# Patient Record
Sex: FEMALE | Race: WHITE | NOT HISPANIC OR LATINO
[De-identification: names, ages, dates, MRNs, and addresses within clinical notes are randomized per-mention and may not be internally consistent; named-entity substitution may affect disease eponyms.]

---

## 2017-02-23 ENCOUNTER — APPOINTMENT (OUTPATIENT)
Dept: ULTRASOUND IMAGING | Facility: CLINIC | Age: 72
End: 2017-02-23

## 2017-02-23 ENCOUNTER — OUTPATIENT (OUTPATIENT)
Dept: OUTPATIENT SERVICES | Facility: HOSPITAL | Age: 72
LOS: 1 days | End: 2017-02-23
Payer: MEDICARE

## 2017-02-23 DIAGNOSIS — R10.13 EPIGASTRIC PAIN: ICD-10-CM

## 2017-02-23 PROCEDURE — 76700 US EXAM ABDOM COMPLETE: CPT

## 2017-09-15 ENCOUNTER — EMERGENCY (EMERGENCY)
Facility: HOSPITAL | Age: 72
LOS: 1 days | Discharge: HOME | End: 2017-09-15
Attending: EMERGENCY MEDICINE | Admitting: EMERGENCY MEDICINE
Payer: MEDICARE

## 2017-09-15 VITALS
OXYGEN SATURATION: 98 % | DIASTOLIC BLOOD PRESSURE: 67 MMHG | RESPIRATION RATE: 17 BRPM | SYSTOLIC BLOOD PRESSURE: 141 MMHG | HEART RATE: 72 BPM

## 2017-09-15 VITALS
HEART RATE: 65 BPM | DIASTOLIC BLOOD PRESSURE: 85 MMHG | TEMPERATURE: 98 F | RESPIRATION RATE: 18 BRPM | SYSTOLIC BLOOD PRESSURE: 178 MMHG | OXYGEN SATURATION: 96 %

## 2017-09-15 LAB
ALBUMIN SERPL ELPH-MCNC: 4.3 G/DL — SIGNIFICANT CHANGE UP (ref 3.3–5)
ALP SERPL-CCNC: 76 U/L — SIGNIFICANT CHANGE UP (ref 40–120)
ALT FLD-CCNC: 19 U/L RC — SIGNIFICANT CHANGE UP (ref 10–45)
ANION GAP SERPL CALC-SCNC: 12 MMOL/L — SIGNIFICANT CHANGE UP (ref 5–17)
AST SERPL-CCNC: 18 U/L — SIGNIFICANT CHANGE UP (ref 10–40)
BASOPHILS # BLD AUTO: 0 K/UL — SIGNIFICANT CHANGE UP (ref 0–0.2)
BASOPHILS NFR BLD AUTO: 0.3 % — SIGNIFICANT CHANGE UP (ref 0–2)
BILIRUB SERPL-MCNC: 1.2 MG/DL — SIGNIFICANT CHANGE UP (ref 0.2–1.2)
BUN SERPL-MCNC: 16 MG/DL — SIGNIFICANT CHANGE UP (ref 7–23)
CALCIUM SERPL-MCNC: 9.1 MG/DL — SIGNIFICANT CHANGE UP (ref 8.4–10.5)
CHLORIDE SERPL-SCNC: 104 MMOL/L — SIGNIFICANT CHANGE UP (ref 96–108)
CK MB CFR SERPL CALC: 4.9 NG/ML — HIGH (ref 0–3.8)
CK SERPL-CCNC: 67 U/L — SIGNIFICANT CHANGE UP (ref 25–170)
CO2 SERPL-SCNC: 29 MMOL/L — SIGNIFICANT CHANGE UP (ref 22–31)
CREAT SERPL-MCNC: 1.08 MG/DL — SIGNIFICANT CHANGE UP (ref 0.5–1.3)
EOSINOPHIL # BLD AUTO: 0.1 K/UL — SIGNIFICANT CHANGE UP (ref 0–0.5)
EOSINOPHIL NFR BLD AUTO: 1 % — SIGNIFICANT CHANGE UP (ref 0–6)
GLUCOSE SERPL-MCNC: 220 MG/DL — HIGH (ref 70–99)
HCT VFR BLD CALC: 42.5 % — SIGNIFICANT CHANGE UP (ref 34.5–45)
HGB BLD-MCNC: 14.8 G/DL — SIGNIFICANT CHANGE UP (ref 11.5–15.5)
LYMPHOCYTES # BLD AUTO: 1.7 K/UL — SIGNIFICANT CHANGE UP (ref 1–3.3)
LYMPHOCYTES # BLD AUTO: 16.9 % — SIGNIFICANT CHANGE UP (ref 13–44)
MAGNESIUM SERPL-MCNC: 2.2 MG/DL — SIGNIFICANT CHANGE UP (ref 1.6–2.6)
MCHC RBC-ENTMCNC: 28.4 PG — SIGNIFICANT CHANGE UP (ref 27–34)
MCHC RBC-ENTMCNC: 34.9 GM/DL — SIGNIFICANT CHANGE UP (ref 32–36)
MCV RBC AUTO: 81.3 FL — SIGNIFICANT CHANGE UP (ref 80–100)
MONOCYTES # BLD AUTO: 0.7 K/UL — SIGNIFICANT CHANGE UP (ref 0–0.9)
MONOCYTES NFR BLD AUTO: 7 % — SIGNIFICANT CHANGE UP (ref 2–14)
NEUTROPHILS # BLD AUTO: 7.7 K/UL — HIGH (ref 1.8–7.4)
NEUTROPHILS NFR BLD AUTO: 74.8 % — SIGNIFICANT CHANGE UP (ref 43–77)
PHOSPHATE SERPL-MCNC: 2.3 MG/DL — LOW (ref 2.5–4.5)
PLATELET # BLD AUTO: 183 K/UL — SIGNIFICANT CHANGE UP (ref 150–400)
POTASSIUM SERPL-MCNC: 3.5 MMOL/L — SIGNIFICANT CHANGE UP (ref 3.5–5.3)
POTASSIUM SERPL-SCNC: 3.5 MMOL/L — SIGNIFICANT CHANGE UP (ref 3.5–5.3)
PROT SERPL-MCNC: 7 G/DL — SIGNIFICANT CHANGE UP (ref 6–8.3)
RBC # BLD: 5.23 M/UL — HIGH (ref 3.8–5.2)
RBC # FLD: 13.3 % — SIGNIFICANT CHANGE UP (ref 10.3–14.5)
SODIUM SERPL-SCNC: 145 MMOL/L — SIGNIFICANT CHANGE UP (ref 135–145)
TROPONIN T SERPL-MCNC: <0.01 NG/ML — SIGNIFICANT CHANGE UP (ref 0–0.06)
TROPONIN T SERPL-MCNC: <0.01 NG/ML — SIGNIFICANT CHANGE UP (ref 0–0.06)
WBC # BLD: 10.3 K/UL — SIGNIFICANT CHANGE UP (ref 3.8–10.5)
WBC # FLD AUTO: 10.3 K/UL — SIGNIFICANT CHANGE UP (ref 3.8–10.5)

## 2017-09-15 PROCEDURE — 71046 X-RAY EXAM CHEST 2 VIEWS: CPT

## 2017-09-15 PROCEDURE — 80053 COMPREHEN METABOLIC PANEL: CPT

## 2017-09-15 PROCEDURE — 85027 COMPLETE CBC AUTOMATED: CPT

## 2017-09-15 PROCEDURE — 84484 ASSAY OF TROPONIN QUANT: CPT

## 2017-09-15 PROCEDURE — 93010 ELECTROCARDIOGRAM REPORT: CPT

## 2017-09-15 PROCEDURE — 83735 ASSAY OF MAGNESIUM: CPT

## 2017-09-15 PROCEDURE — 82550 ASSAY OF CK (CPK): CPT

## 2017-09-15 PROCEDURE — 82553 CREATINE MB FRACTION: CPT

## 2017-09-15 PROCEDURE — G0378: CPT

## 2017-09-15 PROCEDURE — 99220: CPT | Mod: 25

## 2017-09-15 PROCEDURE — 99284 EMERGENCY DEPT VISIT MOD MDM: CPT | Mod: 25

## 2017-09-15 PROCEDURE — 71020: CPT | Mod: 26

## 2017-09-15 PROCEDURE — 93005 ELECTROCARDIOGRAM TRACING: CPT

## 2017-09-15 PROCEDURE — 84100 ASSAY OF PHOSPHORUS: CPT

## 2017-09-15 RX ADMIN — Medication 0.2 MILLIGRAM(S): at 19:46

## 2017-09-15 NOTE — ED ADULT NURSE NOTE - OBJECTIVE STATEMENT
pt biba s/p hypotensive episode associated with diaphoresis with systolic in the 70's.  as per ems, pt suddenly began to look pale with diaphoretic.  pt has hx of hypertension and dm, so they checked her bp and found it was low x 3.  ems services was called for further intervention.  upon arrival to ed, pt was normal tensive and denies any accompanying s/s; pt states that she feels much better.  she also states that she thinks she took too much of her clonidine this morning.  pt denies any loc, h/a or dizziness at this time.  pt is awake, alert and responsive to all stimuli.  no sob or respiratory distress noted.  pt resting in bed in NSR awaiting md reeval.  will continue to monitor.

## 2017-09-15 NOTE — ED PROVIDER NOTE - OBJECTIVE STATEMENT
72 YOF presents to ED BIBEMS with low blood pressure, near syncope. Pt took extra clonidine by accident at 830 this morning and 15 minutes later felt fatigued, lightheaded diaphoretic, had BP checked 70s/40s and almost passed out, ems was called. Pt denies any current symptoms, chest pain, lightheadedness, fatigue, fevers, chills, abd pain, dysuria.

## 2017-09-15 NOTE — ED ADULT NURSE REASSESSMENT NOTE - NS ED NURSE REASSESS COMMENT FT1
Received pt from JOHN Medina , received pt alert and responsive, oriented x4, denies any respiratory distress, SOB, or difficulty breathing. Pt transferred to CDU for observation for "taking too much clonidine" pt bp elevated due to drinking "too much salt water", pt asymptomatic, PETE Mcgrath aware of elevated BP reading. Waiting for orders. Kosher meal given. IV in place, patent and free of signs of infiltration, placed on continuous cardiac monitoring as ordered, NSR HR in the 70's,  pt denies chest pain or palpitations, V/S stable, pt afebrile, pt denies pain at this time. Pt educated on unit and unit rules, instructed patient to notify RN of any needed assistance, Pt verbalizes understanding, Call bell placed within reach. Safety maintained. Will continue to monitor. Spouse at bedside. Received pt from JOHN Medina , received pt alert and responsive, oriented x4, denies any respiratory distress, SOB, or difficulty breathing. Pt transferred to CDU for observation for near syncope, pt states she "taking too much clonidine" pt bp elevated due to drinking "too much salt water", pt asymptomatic, PETE Mcgrath aware of elevated BP reading. Waiting for orders. Kosher meal given. IV in place, patent and free of signs of infiltration, placed on continuous cardiac monitoring as ordered, NSR HR in the 70's,  pt denies chest pain or palpitations, V/S stable, pt afebrile, pt denies pain at this time. Pt educated on unit and unit rules, instructed patient to notify RN of any needed assistance, Pt verbalizes understanding, Call bell placed within reach. Safety maintained. Will continue to monitor. Spouse at bedside.

## 2017-09-15 NOTE — ED ADULT NURSE REASSESSMENT NOTE - NS ED NURSE REASSESS COMMENT FT1
1500 received the pt from Tyler from [facility] pt is A&OX4 pt is ambulatory oriented to the unit Pt denies CP SOB N/V/D . states ? accidentally took .6 mg of clonidine. states she feels fine now vital signs are stable  continue to monitor

## 2017-09-15 NOTE — ED CDU PROVIDER NOTE - DETAILS
HYPOTENSION 2/2 medication OD  - Continuous telemetry monitoring   - Serial troponin  - q4 Vitals   - Freq re-eval  - Monitor for 12 hours from medication ingestion  D/w Dr. Zimmer

## 2017-09-15 NOTE — ED PROVIDER NOTE - ATTENDING CONTRIBUTION TO CARE
Attending MD Zimmer:  I personally have seen and examined this patient.  Resident note reviewed and agree on plan of care and except where noted.  See MDM for details.

## 2017-09-15 NOTE — ED CDU PROVIDER NOTE - PLAN OF CARE
1. Rest. Stay well hydrated. BE CAREFUL WHILE TAKING YOUR MEDICATION AND ONLY TAKE MEDICATION AS PRESCRIBED BY YOUR DOCTORS.   2. Follow up with your PMD and cardiologist, Dr. Naranjo, within 24-48 hours. Show copies of your reports given to you.    3. Return to ER for dizziness, chest pain, palpitations, shortness of breath, passing out, or any other concerning symptoms.

## 2017-09-15 NOTE — ED CDU PROVIDER NOTE - OBJECTIVE STATEMENT
72 YOF presents to ED BIBEMS with low blood pressure, near syncope. Pt took extra clonidine by accident at 830 this morning and 15 minutes later felt fatigued, lightheaded, diaphoretic, had BP checked 70s/40s and almost passed out, ems was called. Pt denies any current symptoms, chest pain, palpitations, lightheadedness, fatigue, fevers, chills, abd pain, dysuria.  In ED, VSS. Labs WNL, trop x 1 negative. Will admit to CDU for tele monitoring, repeat trop, q4 vitals and re-eval.   PCP DEBO Bates  Cards Christian Naranjo

## 2017-09-15 NOTE — ED ADULT NURSE REASSESSMENT NOTE - NS ED NURSE REASSESS COMMENT FT1
Pt d/c home per PETE Mcgrath/ Dr. Wong, VSS no complaints no distress, IV lock removed, D/C  documentation provided to pt by PA, ambulated out of unit independently, ambulated out of unit with spouse left hospital via private car.

## 2017-09-15 NOTE — ED CDU PROVIDER NOTE - PROGRESS NOTE DETAILS
Spoke with patient's cardiologist, Dr. Naranjo. Agrees with plan for CDU. - Narda Sweeney PA-C Patient observed ambulating around CDU and re-evaluated at bedside. Pt in NAD. No complaints. Vital Signs Stable. Repeat troponin negative. No events on telemetry monitor.  Will continue to monitor. -Narda Sweeney PA-C Patient resting comfortably in bed, NAD, currently hypertensive to 205/120. Has not received her evening dose of clonidine, will give 0.2mg and reassess. Patient otherwise asymptomatic, will discuss discharge with evening attending. Alcides Vera PA-C. Attending note--Pt doing better BP is well within acceptable range D/W pt regardingproper meds compliance and medicines safety Heart and Lungs wnl ambulatory will d/c have f/u with PMD --Wong

## 2017-09-15 NOTE — ED PROVIDER NOTE - PMH
Atrial Arrhythmia    Diabetes Mellitus    Hypercholesterolemia    Hypertension    Obesity (BMI 30-39.9)    Osteoarthritis    Urinary Urgency

## 2017-09-15 NOTE — ED PROVIDER NOTE - MEDICAL DECISION MAKING DETAILS
near syncope, took extra dose of clonidine. Will check electrolytes, ekg, troponin, reassess. near syncope, took extra dose of clonidine. Will check electrolytes, ekg, troponin, reassess.    Attending MD Zimmer: 72 YOF presents to ED BIBEMS with low blood pressure, near syncope. Pt took extra clonidine by accident at 830 this morning and 15 minutes later felt fatigued, lightheaded diaphoretic, had BP checked 70s/40s and near syncope.  On arrival in ED patient slightly lethargic but otherwise the physical exam and neurologic exam was normal.  likely medication side effect.  Will obtain labs, EKG, orthostatic BPs, IVF and observation.

## 2017-10-18 ENCOUNTER — OUTPATIENT (OUTPATIENT)
Dept: OUTPATIENT SERVICES | Facility: HOSPITAL | Age: 72
LOS: 1 days | Discharge: HOME | End: 2017-10-18

## 2017-10-18 LAB
GLUCOSE BLDC GLUCOMTR-MCNC: 133 MG/DL — HIGH (ref 70–99)
GLUCOSE BLDC GLUCOMTR-MCNC: 156 MG/DL — HIGH (ref 70–99)

## 2017-10-24 DIAGNOSIS — Z87.891 PERSONAL HISTORY OF NICOTINE DEPENDENCE: ICD-10-CM

## 2017-10-24 DIAGNOSIS — I10 ESSENTIAL (PRIMARY) HYPERTENSION: ICD-10-CM

## 2017-10-24 DIAGNOSIS — E10.9 TYPE 1 DIABETES MELLITUS WITHOUT COMPLICATIONS: ICD-10-CM

## 2017-10-24 DIAGNOSIS — H25.811 COMBINED FORMS OF AGE-RELATED CATARACT, RIGHT EYE: ICD-10-CM

## 2017-11-07 ENCOUNTER — APPOINTMENT (OUTPATIENT)
Dept: ULTRASOUND IMAGING | Facility: IMAGING CENTER | Age: 72
End: 2017-11-07
Payer: MEDICARE

## 2017-11-07 ENCOUNTER — OUTPATIENT (OUTPATIENT)
Dept: OUTPATIENT SERVICES | Facility: HOSPITAL | Age: 72
LOS: 1 days | End: 2017-11-07
Payer: MEDICARE

## 2017-11-07 DIAGNOSIS — Z00.8 ENCOUNTER FOR OTHER GENERAL EXAMINATION: ICD-10-CM

## 2017-11-07 PROCEDURE — 76536 US EXAM OF HEAD AND NECK: CPT | Mod: 26

## 2017-11-07 PROCEDURE — 76536 US EXAM OF HEAD AND NECK: CPT

## 2017-12-05 ENCOUNTER — TRANSCRIPTION ENCOUNTER (OUTPATIENT)
Age: 72
End: 2017-12-05

## 2018-01-02 ENCOUNTER — APPOINTMENT (OUTPATIENT)
Dept: COLORECTAL SURGERY | Facility: CLINIC | Age: 73
End: 2018-01-02
Payer: MEDICARE

## 2018-01-02 VITALS
SYSTOLIC BLOOD PRESSURE: 162 MMHG | HEIGHT: 61 IN | HEART RATE: 75 BPM | RESPIRATION RATE: 16 BRPM | WEIGHT: 178 LBS | BODY MASS INDEX: 33.61 KG/M2 | OXYGEN SATURATION: 97 % | DIASTOLIC BLOOD PRESSURE: 93 MMHG

## 2018-01-02 DIAGNOSIS — K62.5 HEMORRHAGE OF ANUS AND RECTUM: ICD-10-CM

## 2018-01-02 DIAGNOSIS — K62.89 OTHER SPECIFIED DISEASES OF ANUS AND RECTUM: ICD-10-CM

## 2018-01-02 DIAGNOSIS — K64.9 UNSPECIFIED HEMORRHOIDS: ICD-10-CM

## 2018-01-02 PROCEDURE — 99204 OFFICE O/P NEW MOD 45 MIN: CPT | Mod: 25

## 2018-01-02 PROCEDURE — 46600 DIAGNOSTIC ANOSCOPY SPX: CPT

## 2018-01-02 RX ORDER — PREDNISOLONE ACETATE 10 MG/ML
1 SUSPENSION/ DROPS OPHTHALMIC
Qty: 10 | Refills: 0 | Status: DISCONTINUED | COMMUNITY
Start: 2017-10-19

## 2018-01-02 RX ORDER — CIPROFLOXACIN 3 MG/ML
0.3 SOLUTION OPHTHALMIC
Qty: 5 | Refills: 0 | Status: DISCONTINUED | COMMUNITY
Start: 2017-10-19

## 2018-01-02 RX ORDER — PEN NEEDLE, DIABETIC 29 G X1/2"
32G X 4 MM NEEDLE, DISPOSABLE MISCELLANEOUS
Qty: 400 | Refills: 0 | Status: ACTIVE | COMMUNITY
Start: 2017-04-24

## 2018-01-29 ENCOUNTER — APPOINTMENT (OUTPATIENT)
Dept: COLORECTAL SURGERY | Facility: HOSPITAL | Age: 73
End: 2018-01-29

## 2018-08-14 ENCOUNTER — OUTPATIENT (OUTPATIENT)
Dept: OUTPATIENT SERVICES | Facility: HOSPITAL | Age: 73
LOS: 1 days | Discharge: HOME | End: 2018-08-14
Payer: MEDICARE

## 2018-08-14 DIAGNOSIS — C50.919 MALIGNANT NEOPLASM OF UNSPECIFIED SITE OF UNSPECIFIED FEMALE BREAST: ICD-10-CM

## 2018-08-27 ENCOUNTER — RESULT REVIEW (OUTPATIENT)
Age: 73
End: 2018-08-27

## 2018-08-27 PROCEDURE — 88321 CONSLTJ&REPRT SLD PREP ELSWR: CPT

## 2018-08-28 ENCOUNTER — APPOINTMENT (OUTPATIENT)
Dept: HEMATOLOGY ONCOLOGY | Facility: CLINIC | Age: 73
End: 2018-08-28
Payer: MEDICARE

## 2018-08-28 VITALS
OXYGEN SATURATION: 96 % | BODY MASS INDEX: 33.27 KG/M2 | DIASTOLIC BLOOD PRESSURE: 73 MMHG | SYSTOLIC BLOOD PRESSURE: 128 MMHG | WEIGHT: 180.78 LBS | HEART RATE: 67 BPM | TEMPERATURE: 99.1 F | RESPIRATION RATE: 16 BRPM | HEIGHT: 61.81 IN

## 2018-08-28 DIAGNOSIS — Z85.118 PERSONAL HISTORY OF OTHER MALIGNANT NEOPLASM OF BRONCHUS AND LUNG: ICD-10-CM

## 2018-08-28 DIAGNOSIS — Z80.3 FAMILY HISTORY OF MALIGNANT NEOPLASM OF BREAST: ICD-10-CM

## 2018-08-28 DIAGNOSIS — R91.1 SOLITARY PULMONARY NODULE: ICD-10-CM

## 2018-08-28 PROCEDURE — 99205 OFFICE O/P NEW HI 60 MIN: CPT

## 2018-08-28 RX ORDER — INSULIN DEGLUDEC INJECTION 100 U/ML
100 INJECTION, SOLUTION SUBCUTANEOUS
Refills: 0 | Status: ACTIVE | COMMUNITY
Start: 2018-08-28

## 2018-08-28 RX ORDER — LEVOFLOXACIN 500 MG/1
500 TABLET, FILM COATED ORAL
Qty: 10 | Refills: 0 | Status: DISCONTINUED | COMMUNITY
Start: 2018-04-05

## 2018-08-28 RX ORDER — ACETAMINOPHEN AND CODEINE 300; 30 MG/1; MG/1
300-30 TABLET ORAL
Qty: 10 | Refills: 0 | Status: DISCONTINUED | COMMUNITY
Start: 2018-07-31

## 2018-08-28 RX ORDER — LOSARTAN POTASSIUM AND HYDROCHLOROTHIAZIDE 25; 100 MG/1; MG/1
100-25 TABLET ORAL
Qty: 90 | Refills: 0 | Status: DISCONTINUED | COMMUNITY
Start: 2018-07-24

## 2018-08-28 RX ORDER — CAPSAICIN 0.1 %
500 CREAM (GRAM) TOPICAL
Refills: 0 | Status: ACTIVE | COMMUNITY
Start: 2018-08-28

## 2018-08-28 RX ORDER — INSULIN DEGLUDEC INJECTION 200 U/ML
200 INJECTION, SOLUTION SUBCUTANEOUS
Qty: 18 | Refills: 0 | Status: DISCONTINUED | COMMUNITY
Start: 2018-06-15

## 2018-08-30 ENCOUNTER — OUTPATIENT (OUTPATIENT)
Dept: OUTPATIENT SERVICES | Facility: HOSPITAL | Age: 73
LOS: 1 days | Discharge: HOME | End: 2018-08-30
Payer: MEDICARE

## 2018-09-05 ENCOUNTER — APPOINTMENT (OUTPATIENT)
Dept: RADIATION ONCOLOGY | Facility: CLINIC | Age: 73
End: 2018-09-05
Payer: MEDICARE

## 2018-09-05 ENCOUNTER — RESULT REVIEW (OUTPATIENT)
Age: 73
End: 2018-09-05

## 2018-09-05 VITALS
TEMPERATURE: 98.5 F | OXYGEN SATURATION: 96 % | SYSTOLIC BLOOD PRESSURE: 150 MMHG | WEIGHT: 183.31 LBS | BODY MASS INDEX: 34.61 KG/M2 | HEIGHT: 61 IN | DIASTOLIC BLOOD PRESSURE: 79 MMHG | HEART RATE: 67 BPM | RESPIRATION RATE: 16 BRPM

## 2018-09-05 PROCEDURE — 99204 OFFICE O/P NEW MOD 45 MIN: CPT | Mod: 25

## 2018-09-05 PROCEDURE — 77262 THER RADIOLOGY TX PLNG INTRM: CPT

## 2018-09-11 PROCEDURE — 77333 RADIATION TREATMENT AID(S): CPT | Mod: 26

## 2018-09-11 PROCEDURE — 77290 THER RAD SIMULAJ FIELD CPLX: CPT | Mod: 26

## 2018-09-20 PROCEDURE — 77334 RADIATION TREATMENT AID(S): CPT | Mod: 26

## 2018-09-20 PROCEDURE — 77300 RADIATION THERAPY DOSE PLAN: CPT | Mod: 26

## 2018-09-20 PROCEDURE — 77295 3-D RADIOTHERAPY PLAN: CPT | Mod: 26

## 2018-09-24 PROCEDURE — 77280 THER RAD SIMULAJ FIELD SMPL: CPT | Mod: 26

## 2018-09-25 PROCEDURE — 77427 RADIATION TX MANAGEMENT X5: CPT

## 2018-10-02 PROCEDURE — 77427 RADIATION TX MANAGEMENT X5: CPT

## 2018-10-07 NOTE — REVIEW OF SYSTEMS
[Fatigue: Grade 0] : Fatigue: Grade 0 [Breast Pain: Grade 0] : Breast Pain: Grade 0 [Dermatitis Radiation: Grade 1 - Faint erythema or dry desquamation] : Dermatitis Radiation: Grade 1 - Faint erythema or dry desquamation

## 2018-10-07 NOTE — PHYSICAL EXAM
[Normal] : well developed, well nourished, in no acute distress [Breast Palpation Mass] : no palpable masses [Breast Abnormal Lactation (Galactorrhea)] : no nipple discharge [de-identified] : Minimal-mild erythema of the right breast.

## 2018-10-07 NOTE — HISTORY OF PRESENT ILLNESS
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 \par \par 5/16 fx to right breast \par Skin with mild erythema, skin care reviewed.\par No c/o pain offered.

## 2018-10-07 NOTE — DISEASE MANAGEMENT
[Pathological] : TNM Stage: p [IA] : IA [TTNM] : 1a [NTNM] : 0 [MTNM] : 0 [de-identified] : 1325 cGy [de-identified] : 5628 cGy [de-identified] : Right breast

## 2018-10-08 NOTE — REVIEW OF SYSTEMS
[Fatigue: Grade 1 - Fatigue relieved by rest] : Fatigue: Grade 1 - Fatigue relieved by rest [Breast Pain: Grade 0] : Breast Pain: Grade 0 [Skin Hyperpigmentation: Grade 0] : Skin Hyperpigmentation: Grade 0 [Dermatitis Radiation: Grade 1 - Faint erythema or dry desquamation] : Dermatitis Radiation: Grade 1 - Faint erythema or dry desquamation

## 2018-10-09 PROCEDURE — 77427 RADIATION TX MANAGEMENT X5: CPT

## 2018-10-15 NOTE — REVIEW OF SYSTEMS
[Fatigue: Grade 1 - Fatigue relieved by rest] : Fatigue: Grade 1 - Fatigue relieved by rest [Breast Pain: Grade 0] : Breast Pain: Grade 0 [Alopecia: Grade 0] : Alopecia: Grade 0 [Pruritus: Grade 0] : Pruritus: Grade 0 [Skin Atrophy: Grade 0] : Skin Atrophy: Grade 0 [Skin Hyperpigmentation: Grade 1 - Hyperpigmentation covering <10% BSA; no psychosocial impact] : Skin Hyperpigmentation: Grade 1 - Hyperpigmentation covering <10% BSA; no psychosocial impact [Skin Induration: Grade 0] : Skin Induration: Grade 0 [Dermatitis Radiation: Grade 1 - Faint erythema or dry desquamation] : Dermatitis Radiation: Grade 1 - Faint erythema or dry desquamation

## 2018-11-16 ENCOUNTER — APPOINTMENT (OUTPATIENT)
Dept: RADIATION ONCOLOGY | Facility: CLINIC | Age: 73
End: 2018-11-16

## 2018-11-16 VITALS
DIASTOLIC BLOOD PRESSURE: 94 MMHG | WEIGHT: 180.22 LBS | TEMPERATURE: 98.24 F | RESPIRATION RATE: 15 BRPM | SYSTOLIC BLOOD PRESSURE: 178 MMHG | HEART RATE: 71 BPM | BODY MASS INDEX: 34.05 KG/M2

## 2018-11-16 NOTE — DISEASE MANAGEMENT
[Pathological] : TNM Stage: p [IA] : IA [TTNM] : 1a [NTNM] : 0 [MTNM] : 0 [de-identified] : 2650 cGy [de-identified] : 7815 cGy [de-identified] : Right breast

## 2018-11-16 NOTE — PHYSICAL EXAM
[Normal] : well developed, well nourished, in no acute distress [Breast Palpation Mass] : no palpable masses [Breast Abnormal Lactation (Galactorrhea)] : no nipple discharge [de-identified] : mild diffuse erythema of the right breast. well healed surgical scars.

## 2018-11-16 NOTE — HISTORY OF PRESENT ILLNESS
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 \par \par 15/16 fx to right breast- 6360/8340. Today she feels slightly tired. Has some chronic back pain. Denies soreness to breast. She is using eucerin most nights.\par

## 2018-11-16 NOTE — DISEASE MANAGEMENT
[Pathological] : TNM Stage: p [IA] : IA [TTNM] : 1a [NTNM] : 0 [MTNM] : 0 [de-identified] : 3975 cGy [de-identified] : 4317 cGy [de-identified] : Right breast

## 2018-11-16 NOTE — PHYSICAL EXAM
[Breast Palpation Mass] : no palpable masses [Breast Abnormal Lactation (Galactorrhea)] : no nipple discharge [Normal] : oriented to person, place and time, the affect was normal, the mood was normal and not anxious [Oriented To Time, Place, And Person] : oriented to person, place, and time [de-identified] : mild diffuse erythema of the right breast. well healed surgical scars.  [de-identified] : slight pink color to right breast

## 2018-11-16 NOTE — HISTORY OF PRESENT ILLNESS
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 \par \par 10/16 fx to right breast \par She continues to use Eucerin. She endorses increased fatigue.\par She denies breast pain, pruritus, and discharge.

## 2018-11-21 NOTE — REVIEW OF SYSTEMS
[Breast Pain: Grade 1 - Mild pain] : Breast Pain: Grade 1 - Mild pain [Dermatitis Radiation: Grade 1 - Faint erythema or dry desquamation] : Dermatitis Radiation: Grade 1 - Faint erythema or dry desquamation [Skin Hyperpigmentation: Grade 0] : Skin Hyperpigmentation: Grade 0

## 2018-11-21 NOTE — VITALS
[Least Pain Intensity: 0/10] : 0/10 [Pain Description/Quality: ___] : Pain description/quality: [unfilled] [Pain Duration: ___] : Pain duration: [unfilled] [Pain Location: ___] : Pain Location: [unfilled] [NoTreatment Scheduled] : no treatment scheduled [80: Normal activity with effort; some signs or symptoms of disease.] : 80: Normal activity with effort; some signs or symptoms of disease.  [ECOG Performance Status: 1 - Restricted in physically strenuous activity but ambulatory and able to carry out work of a light or sedentary nature] : Performance Status: 1 - Restricted in physically strenuous activity but ambulatory and able to carry out work of a light or sedentary nature, e.g., light house work, office work [Maximal Pain Intensity: 0/10] : 0/10 [Pain Interferes with ADLs] : Pain does not interfere with activities of daily living

## 2018-11-21 NOTE — PHYSICAL EXAM
[Normal] : supple with no thyromegaly or masses appreciated [Breast Palpation Mass] : no palpable masses [Breast Abnormal Lactation (Galactorrhea)] : no nipple discharge [No UE Edema] : there is no upper extremity edema [Axillary Lymph Nodes Enlarged Bilaterally] : axillary [de-identified] : stable linear scar in the area of right medial slightly upper breast in the region of lumpectomy; no other suspicious palpable masses on either breast; stable erythema of the right breast

## 2018-11-21 NOTE — HISTORY OF PRESENT ILLNESS
[FreeTextEntry1] : Ms. Grant  is a 73-year-old  female  with ER+ IA+ Her2-  Stage IA T1a N0 M0 right breast cancer, s/p lumpectomy and adjuvant RT, 4240cGy/16 fxns, completed 10/16/18.   \par \par She came in for post-treatment follow up.  She is concerned about something she might have felt near the lumpectomy scar.\par She denies any new or worsening skin reaction of the treated breast.

## 2019-02-14 ENCOUNTER — APPOINTMENT (OUTPATIENT)
Dept: ORTHOPEDIC SURGERY | Facility: CLINIC | Age: 74
End: 2019-02-14
Payer: MEDICARE

## 2019-02-14 VITALS — BODY MASS INDEX: 33.99 KG/M2 | HEIGHT: 61 IN | WEIGHT: 180 LBS

## 2019-02-14 DIAGNOSIS — M17.0 BILATERAL PRIMARY OSTEOARTHRITIS OF KNEE: ICD-10-CM

## 2019-02-14 PROCEDURE — 99203 OFFICE O/P NEW LOW 30 MIN: CPT

## 2019-02-14 PROCEDURE — 73562 X-RAY EXAM OF KNEE 3: CPT | Mod: 50

## 2019-02-19 ENCOUNTER — APPOINTMENT (OUTPATIENT)
Dept: RADIATION ONCOLOGY | Facility: CLINIC | Age: 74
End: 2019-02-19

## 2019-02-20 ENCOUNTER — OUTPATIENT (OUTPATIENT)
Dept: OUTPATIENT SERVICES | Facility: HOSPITAL | Age: 74
LOS: 1 days | Discharge: HOME | End: 2019-02-20

## 2019-02-20 DIAGNOSIS — C50.919 MALIGNANT NEOPLASM OF UNSPECIFIED SITE OF UNSPECIFIED FEMALE BREAST: ICD-10-CM

## 2019-02-25 ENCOUNTER — APPOINTMENT (OUTPATIENT)
Dept: HEMATOLOGY ONCOLOGY | Facility: CLINIC | Age: 74
End: 2019-02-25
Payer: MEDICARE

## 2019-02-25 VITALS
RESPIRATION RATE: 16 BRPM | DIASTOLIC BLOOD PRESSURE: 84 MMHG | TEMPERATURE: 208.22 F | HEART RATE: 69 BPM | WEIGHT: 180.78 LBS | BODY MASS INDEX: 34.16 KG/M2 | OXYGEN SATURATION: 97 % | SYSTOLIC BLOOD PRESSURE: 152 MMHG

## 2019-02-25 PROCEDURE — 99214 OFFICE O/P EST MOD 30 MIN: CPT

## 2019-02-27 ENCOUNTER — LABORATORY RESULT (OUTPATIENT)
Age: 74
End: 2019-02-27

## 2019-02-28 ENCOUNTER — APPOINTMENT (OUTPATIENT)
Dept: HEMATOLOGY ONCOLOGY | Facility: CLINIC | Age: 74
End: 2019-02-28
Payer: SELF-PAY

## 2019-02-28 PROCEDURE — 96040M: CUSTOM

## 2019-03-06 ENCOUNTER — APPOINTMENT (OUTPATIENT)
Dept: RADIATION ONCOLOGY | Facility: CLINIC | Age: 74
End: 2019-03-06

## 2019-03-08 ENCOUNTER — MESSAGE (OUTPATIENT)
Age: 74
End: 2019-03-08

## 2019-03-11 ENCOUNTER — TRANSCRIPTION ENCOUNTER (OUTPATIENT)
Age: 74
End: 2019-03-11

## 2019-03-26 ENCOUNTER — OUTPATIENT (OUTPATIENT)
Dept: OUTPATIENT SERVICES | Facility: HOSPITAL | Age: 74
LOS: 1 days | Discharge: HOME | End: 2019-03-26

## 2019-03-26 DIAGNOSIS — C50.919 MALIGNANT NEOPLASM OF UNSPECIFIED SITE OF UNSPECIFIED FEMALE BREAST: ICD-10-CM

## 2019-04-01 ENCOUNTER — APPOINTMENT (OUTPATIENT)
Dept: HEMATOLOGY ONCOLOGY | Facility: CLINIC | Age: 74
End: 2019-04-01

## 2019-04-02 ENCOUNTER — APPOINTMENT (OUTPATIENT)
Dept: RADIATION ONCOLOGY | Facility: CLINIC | Age: 74
End: 2019-04-02

## 2019-04-04 ENCOUNTER — APPOINTMENT (OUTPATIENT)
Dept: HEMATOLOGY ONCOLOGY | Facility: CLINIC | Age: 74
End: 2019-04-04
Payer: SELF-PAY

## 2019-04-04 PROCEDURE — 99499A: CUSTOM | Mod: NC

## 2019-04-21 NOTE — HISTORY OF PRESENT ILLNESS
[Disease: _____________________] : Disease: [unfilled] [T: ___] : T[unfilled] [N: ___] : N[unfilled] [M: ___] : M[unfilled] [AJCC Stage: ____] : AJCC Stage: [unfilled] [de-identified] : The patient presented in 2018, at age 73, with an abnormal screening mammogram.\par \par Screening mammogram performed on 2018 demonstrated a lobulated asymmetric density in the medial right breast. Diagnostic right tomosynthesis mammogram on the same day demonstrated a 7 mm lobulated slightly irregular nodule in the medial right breast,at approximately 3:00. It was not seen on ultrasound. Stereotactic core biopsy was performed on 2018 and demonstrated well to moderately differentiated ductal infiltrating ductal carcinoma which was ER positive (%), LA positive (%) HER-2/tabitha negative (zero) and Ki-67 15%. 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 The infiltrating component measured 0.35 cm.\par \par Dr. Mindy Lowery performed a lumpectomy and sentinel lymph node biopsy on 2018. Pathology demonstrated no residual invasive carcinoma. There was DCIS, intermediate nuclear grade with necrosis and microcalcifications within the specimen measuring 0.6 cm and present in 2 of 14 blocks. The surgical margins were negative (>5 mm). There were 3 negative sentinel lymph nodes \par \par The patient has had an uneventful postoperative course.\par \par Risk factors:\par Prior breast disease: 2 previous biopsies, one demonstrating a papilloma. Menarche: Age 10. Menopause: Age 41. The patient is  5 para  with her first childbirth at age 26. She did not breast-feed her children. Oral contraceptive use: None. Hormone replacement therapy: None. Other hormone exposure: None. Topical estrogen: None. Family history is positive for a mother who had breast cancer in her late 70s/early 80s, a sister who had breast cancer at age 67, and a maternal first cousin who had breast cancer in her 50s. The patient herself has had adenocarcinoma of the lung and basal cell carcinomas. The patient denies any other family history of cancer or colorectal neoplasia. She is of Sephardic ([place]  Sikhism background. She has not had genetic counseling or testing. [de-identified] : Well to moderately differentiated infiltrating ductal carcinoma ER positive, AR positive, HER-2/tabitha negative [de-identified] : The patient has been 6 months post diagnosis of Right breast cancer.\par \par The patient will initiate  Anastrozole today.\par \par Last saw breast surgeon Mindy Bar, 6  months ago, exam stable.\par \par Had bilateral diagnostic mammogram and right breast US 02/12/2019,  BI RADS 2\par \par The patient initiated RT  09/2018,  completed 10/18/2018.Last saw  Radiation oncologist Dr Rogelio Moya for post RT f/u 11/16/2018.\par \par Had initial evaluation with Orthopedic Dr Marquise Galarza 02/14/2019 for bilateral knee pain, right > left.The patient states she is scheduled  for left knee replacement 05/15/2019.\par The patient  states her  orthopedic advised,   that her degenerative disease in b/l knees are very  advanced and will no longer  give cortisone injection but will rather, patient will need   knee replacement.\par \par Saw GYN Crispin last week, who advised  patient to f/u with genetic testing and based  on result, decision re  BSO  will be made.\par The patient states she is scheduled for genetic  counselling next week.\par \par Saw endocrinologist Dr Osmel Trivedi 02/15/2019 for f/u hypothyroidism, the patient states she  had extensive lab work. Plan to fax in lab result. Refusing blood work to be done today. Will request report.\par \par No other interval event since last seen.

## 2019-04-21 NOTE — REASON FOR VISIT
[Follow-Up Visit] : a follow-up [Initial Consultation] : an initial consultation [Other: _____] : [unfilled] [FreeTextEntry2] : infiltrating ductal carcinoma right breast

## 2019-04-21 NOTE — PHYSICAL EXAM
[Ambulatory and capable of all self care but unable to carry out any work activities] : Status 2- Ambulatory and capable of all self care but unable to carry out any work activities. Up and about more than 50% of waking hours [Obese] : obese [Normal] : affect appropriate [de-identified] : 3/6 KIRTI UZAIRB [de-identified] : Right breast: healed incision UOQ. Healed incision UIQ with underlying hematoma. Healed incision right axilla with small seroma. Left breast: no mass.

## 2019-04-21 NOTE — REVIEW OF SYSTEMS
[Patient Intake Form Reviewed] : Patient intake form was reviewed [FreeTextEntry2] : Energy is  good  today. Declined flu vaccination  [FreeTextEntry3] : Last eye exam with benign findings. [FreeTextEntry7] : Most recent colonoscopy approximately 2015. States, she is scheduled to see  her GI next month. [FreeTextEntry8] : Saw GYN Crispin last week. See interval history [FreeTextEntry9] : Most recent BMD 2018.

## 2019-04-30 ENCOUNTER — APPOINTMENT (OUTPATIENT)
Dept: ORTHOPEDIC SURGERY | Facility: CLINIC | Age: 74
End: 2019-04-30
Payer: MEDICARE

## 2019-04-30 VITALS
HEIGHT: 61 IN | HEART RATE: 65 BPM | SYSTOLIC BLOOD PRESSURE: 157 MMHG | BODY MASS INDEX: 34.36 KG/M2 | WEIGHT: 182 LBS | DIASTOLIC BLOOD PRESSURE: 83 MMHG

## 2019-04-30 DIAGNOSIS — Z87.09 PERSONAL HISTORY OF OTHER DISEASES OF THE RESPIRATORY SYSTEM: ICD-10-CM

## 2019-04-30 DIAGNOSIS — M17.11 UNILATERAL PRIMARY OSTEOARTHRITIS, RIGHT KNEE: ICD-10-CM

## 2019-04-30 DIAGNOSIS — M17.12 UNILATERAL PRIMARY OSTEOARTHRITIS, LEFT KNEE: ICD-10-CM

## 2019-04-30 DIAGNOSIS — Z85.3 PERSONAL HISTORY OF MALIGNANT NEOPLASM OF BREAST: ICD-10-CM

## 2019-04-30 PROCEDURE — 72170 X-RAY EXAM OF PELVIS: CPT | Mod: 59

## 2019-04-30 PROCEDURE — 73562 X-RAY EXAM OF KNEE 3: CPT | Mod: 50

## 2019-04-30 PROCEDURE — 99214 OFFICE O/P EST MOD 30 MIN: CPT

## 2019-05-01 ENCOUNTER — OUTPATIENT (OUTPATIENT)
Dept: OUTPATIENT SERVICES | Facility: HOSPITAL | Age: 74
LOS: 1 days | End: 2019-05-01
Payer: MEDICARE

## 2019-05-01 VITALS
DIASTOLIC BLOOD PRESSURE: 84 MMHG | HEIGHT: 62 IN | TEMPERATURE: 98 F | HEART RATE: 65 BPM | RESPIRATION RATE: 15 BRPM | WEIGHT: 177.91 LBS | SYSTOLIC BLOOD PRESSURE: 140 MMHG | OXYGEN SATURATION: 95 %

## 2019-05-01 DIAGNOSIS — M17.0 BILATERAL PRIMARY OSTEOARTHRITIS OF KNEE: ICD-10-CM

## 2019-05-01 DIAGNOSIS — Z01.818 ENCOUNTER FOR OTHER PREPROCEDURAL EXAMINATION: ICD-10-CM

## 2019-05-01 DIAGNOSIS — Z90.2 ACQUIRED ABSENCE OF LUNG [PART OF]: Chronic | ICD-10-CM

## 2019-05-01 DIAGNOSIS — E11.9 TYPE 2 DIABETES MELLITUS WITHOUT COMPLICATIONS: ICD-10-CM

## 2019-05-01 DIAGNOSIS — M17.12 UNILATERAL PRIMARY OSTEOARTHRITIS, LEFT KNEE: ICD-10-CM

## 2019-05-01 LAB
ALBUMIN SERPL ELPH-MCNC: 3.9 G/DL — SIGNIFICANT CHANGE UP (ref 3.3–5)
ALP SERPL-CCNC: 72 U/L — SIGNIFICANT CHANGE UP (ref 30–120)
ALT FLD-CCNC: 32 U/L DA — SIGNIFICANT CHANGE UP (ref 10–60)
ANION GAP SERPL CALC-SCNC: 4 MMOL/L — LOW (ref 5–17)
APTT BLD: 27.9 SEC — LOW (ref 28.5–37)
AST SERPL-CCNC: 22 U/L — SIGNIFICANT CHANGE UP (ref 10–40)
BILIRUB SERPL-MCNC: 1.1 MG/DL — SIGNIFICANT CHANGE UP (ref 0.2–1.2)
BLD GP AB SCN SERPL QL: SIGNIFICANT CHANGE UP
BUN SERPL-MCNC: 20 MG/DL — SIGNIFICANT CHANGE UP (ref 7–23)
CALCIUM SERPL-MCNC: 9.1 MG/DL — SIGNIFICANT CHANGE UP (ref 8.4–10.5)
CHLORIDE SERPL-SCNC: 103 MMOL/L — SIGNIFICANT CHANGE UP (ref 96–108)
CO2 SERPL-SCNC: 36 MMOL/L — HIGH (ref 22–31)
CREAT SERPL-MCNC: 0.84 MG/DL — SIGNIFICANT CHANGE UP (ref 0.5–1.3)
GLUCOSE SERPL-MCNC: 110 MG/DL — HIGH (ref 70–99)
HBA1C BLD-MCNC: 7 % — HIGH (ref 4–5.6)
HCT VFR BLD CALC: 41.2 % — SIGNIFICANT CHANGE UP (ref 34.5–45)
HGB BLD-MCNC: 14 G/DL — SIGNIFICANT CHANGE UP (ref 11.5–15.5)
INR BLD: 1.02 RATIO — SIGNIFICANT CHANGE UP (ref 0.88–1.16)
MCHC RBC-ENTMCNC: 27.3 PG — SIGNIFICANT CHANGE UP (ref 27–34)
MCHC RBC-ENTMCNC: 34 GM/DL — SIGNIFICANT CHANGE UP (ref 32–36)
MCV RBC AUTO: 80.5 FL — SIGNIFICANT CHANGE UP (ref 80–100)
MRSA PCR RESULT.: SIGNIFICANT CHANGE UP
NRBC # BLD: 0 /100 WBCS — SIGNIFICANT CHANGE UP (ref 0–0)
PLATELET # BLD AUTO: 184 K/UL — SIGNIFICANT CHANGE UP (ref 150–400)
POTASSIUM SERPL-MCNC: 4 MMOL/L — SIGNIFICANT CHANGE UP (ref 3.5–5.3)
POTASSIUM SERPL-SCNC: 4 MMOL/L — SIGNIFICANT CHANGE UP (ref 3.5–5.3)
PROT SERPL-MCNC: 6.9 G/DL — SIGNIFICANT CHANGE UP (ref 6–8.3)
PROTHROM AB SERPL-ACNC: 11.1 SEC — SIGNIFICANT CHANGE UP (ref 10–12.9)
RBC # BLD: 5.12 M/UL — SIGNIFICANT CHANGE UP (ref 3.8–5.2)
RBC # FLD: 12.8 % — SIGNIFICANT CHANGE UP (ref 10.3–14.5)
S AUREUS DNA NOSE QL NAA+PROBE: SIGNIFICANT CHANGE UP
SODIUM SERPL-SCNC: 143 MMOL/L — SIGNIFICANT CHANGE UP (ref 135–145)
WBC # BLD: 6.22 K/UL — SIGNIFICANT CHANGE UP (ref 3.8–10.5)
WBC # FLD AUTO: 6.22 K/UL — SIGNIFICANT CHANGE UP (ref 3.8–10.5)

## 2019-05-01 PROCEDURE — 85610 PROTHROMBIN TIME: CPT

## 2019-05-01 PROCEDURE — 86900 BLOOD TYPING SEROLOGIC ABO: CPT

## 2019-05-01 PROCEDURE — 87641 MR-STAPH DNA AMP PROBE: CPT

## 2019-05-01 PROCEDURE — 83036 HEMOGLOBIN GLYCOSYLATED A1C: CPT

## 2019-05-01 PROCEDURE — 85730 THROMBOPLASTIN TIME PARTIAL: CPT

## 2019-05-01 PROCEDURE — 87640 STAPH A DNA AMP PROBE: CPT

## 2019-05-01 PROCEDURE — 80053 COMPREHEN METABOLIC PANEL: CPT

## 2019-05-01 PROCEDURE — 85027 COMPLETE CBC AUTOMATED: CPT

## 2019-05-01 PROCEDURE — 86901 BLOOD TYPING SEROLOGIC RH(D): CPT

## 2019-05-01 PROCEDURE — 36415 COLL VENOUS BLD VENIPUNCTURE: CPT

## 2019-05-01 PROCEDURE — G0463: CPT

## 2019-05-01 PROCEDURE — 86850 RBC ANTIBODY SCREEN: CPT

## 2019-05-01 PROCEDURE — 93010 ELECTROCARDIOGRAM REPORT: CPT

## 2019-05-01 PROCEDURE — 93005 ELECTROCARDIOGRAM TRACING: CPT

## 2019-05-01 RX ORDER — DEXTROSE 50 % IN WATER 50 %
25 SYRINGE (ML) INTRAVENOUS ONCE
Qty: 0 | Refills: 0 | Status: DISCONTINUED | OUTPATIENT
Start: 2019-05-15 | End: 2019-05-20

## 2019-05-01 RX ORDER — SODIUM CHLORIDE 9 MG/ML
1000 INJECTION, SOLUTION INTRAVENOUS
Qty: 0 | Refills: 0 | Status: DISCONTINUED | OUTPATIENT
Start: 2019-05-15 | End: 2019-05-20

## 2019-05-01 RX ORDER — DEXTROSE 50 % IN WATER 50 %
15 SYRINGE (ML) INTRAVENOUS ONCE
Qty: 0 | Refills: 0 | Status: DISCONTINUED | OUTPATIENT
Start: 2019-05-15 | End: 2019-05-20

## 2019-05-01 RX ORDER — GLUCAGON INJECTION, SOLUTION 0.5 MG/.1ML
1 INJECTION, SOLUTION SUBCUTANEOUS ONCE
Qty: 0 | Refills: 0 | Status: DISCONTINUED | OUTPATIENT
Start: 2019-05-15 | End: 2019-05-20

## 2019-05-01 RX ORDER — DEXTROSE 50 % IN WATER 50 %
12.5 SYRINGE (ML) INTRAVENOUS ONCE
Qty: 0 | Refills: 0 | Status: DISCONTINUED | OUTPATIENT
Start: 2019-05-15 | End: 2019-05-20

## 2019-05-01 NOTE — H&P PST ADULT - ASSESSMENT
LEFT TOTAL KNEE REPLACEMENT STAGE 1 ON 5/15/19 AND RIGHT TOTAL KNEE REPLACEMENT ON 5/20/19 AT [facility] WITH DR REYES

## 2019-05-01 NOTE — H&P PST ADULT - NEGATIVE OPHTHALMOLOGIC SYMPTOMS
no diplopia/no blurred vision R/no photophobia/no lacrimation L/no lacrimation R/no blurred vision L

## 2019-05-01 NOTE — H&P PST ADULT - NSICDXPROBLEM_GEN_ALL_CORE_FT
PROBLEM DIAGNOSES  Problem: Diabetes mellitus, type 2  Assessment and Plan:  Patient will call endocrinologist for pre op insulin instructions     Problem: Osteoarthritis of both knees  Assessment and Plan: Left total knee replacement stage 1 on 5/15/19 and left total knee replacement on 5/20/19  Diagnostic testing to be forwarded for pending cardiac/medical clearance  Pharmacy consult requested  Instructions were reviewed and signed  Patient instructed to obtain instructions from hematologist for anastrozole.  Best wishes offered

## 2019-05-01 NOTE — H&P PST ADULT - NSICDXFAMILYHX_GEN_ALL_CORE_FT
FAMILY HISTORY:  Father  Still living? No  Family history of heart attack, Age at diagnosis: Age Unknown    Mother  Still living? No  Family history of breast cancer, Age at diagnosis: Age Unknown    Sibling  Still living? Yes, Estimated age: 61-70  Family history of breast cancer, Age at diagnosis: Age Unknown

## 2019-05-01 NOTE — H&P PST ADULT - NEGATIVE GENERAL GENITOURINARY SYMPTOMS
no hematuria/no flank pain R/no gas in urine/no incontinence/no urine discoloration/no bladder infections/no flank pain L/no urinary hesitancy

## 2019-05-01 NOTE — H&P PST ADULT - NSANTHOSAYNRD_GEN_A_CORE
No. JEREMIAH screening performed.  STOP BANG Legend: 0-2 = LOW Risk; 3-4 = INTERMEDIATE Risk; 5-8 = HIGH Risk

## 2019-05-01 NOTE — H&P PST ADULT - NEGATIVE GASTROINTESTINAL SYMPTOMS
no flatulence/no abdominal pain/no constipation/no diarrhea/no change in bowel habits/no nausea/no vomiting

## 2019-05-01 NOTE — H&P PST ADULT - NEGATIVE CARDIOVASCULAR SYMPTOMS
no orthopnea/no peripheral edema/no palpitations/no chest pain/no paroxysmal nocturnal dyspnea/no claudication

## 2019-05-01 NOTE — H&P PST ADULT - HISTORY OF PRESENT ILLNESS
73 y/o Female presents to [facility] presurgical testing prior to scheduled LEFT total knee replacement stage 1 on 5/15/19 and RIGHT total knee replacement on 5/20/19.   Reports pain bilateral knees; worse on left; for which she has tried physical therapy and cortisone injections without relief.   History of falls; cannot climb stairs, reports difficulty with getting off the bed or walking to the bathroom.  Pain rates 10/10.   Takes Advil Liquid Gel with occasional relief.

## 2019-05-01 NOTE — H&P PST ADULT - NSICDXPASTSURGICALHX_GEN_ALL_CORE_FT
PAST SURGICAL HISTORY:  History of D&C 2007    History of lobectomy of lung 2015, right upper lobe    Status Post Breast Lumpectomy right 2018

## 2019-05-01 NOTE — H&P PST ADULT - NSICDXPASTMEDICALHX_GEN_ALL_CORE_FT
PAST MEDICAL HISTORY:  Atrial Arrhythmia     Diabetes mellitus, type 2     Dyslipidemia     History of breast cancer right, 2018    Hypertension     Lung cancer 2015, right lobe. treated with lobectomy    Obesity (BMI 30-39.9)     Obesity (BMI 30.0-34.9)     Onychomycosis of toenail 1st right toe    Osteoarthritis of both knees     Pedal edema     Urinary Urgency

## 2019-05-01 NOTE — H&P PST ADULT - NOTES
2 children- daughter 50 y/o w/ AML 14 y/a; 1 son no health problems. 4 siblings. 1 sister with hx breast ca; 3 brothers 1 with Diabetes.

## 2019-05-02 PROBLEM — B35.1 TINEA UNGUIUM: Chronic | Status: ACTIVE | Noted: 2019-05-01

## 2019-05-02 PROBLEM — C34.90 MALIGNANT NEOPLASM OF UNSPECIFIED PART OF UNSPECIFIED BRONCHUS OR LUNG: Chronic | Status: ACTIVE | Noted: 2019-05-01

## 2019-05-10 RX ORDER — INSULIN LISPRO 100/ML
0 VIAL (ML) SUBCUTANEOUS
Qty: 0 | Refills: 0 | DISCHARGE

## 2019-05-10 RX ORDER — INSULIN DEGLUDEC 100 U/ML
0 INJECTION, SOLUTION SUBCUTANEOUS
Qty: 0 | Refills: 0 | DISCHARGE

## 2019-05-10 RX ORDER — SODIUM CHLORIDE 9 MG/ML
1000 INJECTION, SOLUTION INTRAVENOUS
Refills: 0 | Status: DISCONTINUED | OUTPATIENT
Start: 2019-05-15 | End: 2019-05-18

## 2019-05-10 NOTE — PHARMACOTHERAPY INTERVENTION NOTE - COMMENTS
Presurgical evaluation for postoperative medication management. Team emailed. Note placed in [place].

## 2019-05-13 RX ORDER — SODIUM CHLORIDE 9 MG/ML
1000 INJECTION, SOLUTION INTRAVENOUS
Refills: 0 | Status: DISCONTINUED | OUTPATIENT
Start: 2019-05-15 | End: 2019-05-18

## 2019-05-13 RX ORDER — POLYETHYLENE GLYCOL 3350 17 G/17G
17 POWDER, FOR SOLUTION ORAL DAILY
Refills: 0 | Status: DISCONTINUED | OUTPATIENT
Start: 2019-05-15 | End: 2019-05-20

## 2019-05-13 RX ORDER — MAGNESIUM HYDROXIDE 400 MG/1
30 TABLET, CHEWABLE ORAL DAILY
Refills: 0 | Status: DISCONTINUED | OUTPATIENT
Start: 2019-05-15 | End: 2019-05-20

## 2019-05-13 RX ORDER — PANTOPRAZOLE SODIUM 20 MG/1
40 TABLET, DELAYED RELEASE ORAL
Refills: 0 | Status: DISCONTINUED | OUTPATIENT
Start: 2019-05-15 | End: 2019-05-20

## 2019-05-13 RX ORDER — ONDANSETRON 8 MG/1
4 TABLET, FILM COATED ORAL EVERY 6 HOURS
Refills: 0 | Status: DISCONTINUED | OUTPATIENT
Start: 2019-05-15 | End: 2019-05-16

## 2019-05-13 RX ORDER — DOCUSATE SODIUM 100 MG
100 CAPSULE ORAL THREE TIMES A DAY
Refills: 0 | Status: DISCONTINUED | OUTPATIENT
Start: 2019-05-15 | End: 2019-05-20

## 2019-05-13 RX ORDER — SENNA PLUS 8.6 MG/1
2 TABLET ORAL AT BEDTIME
Refills: 0 | Status: DISCONTINUED | OUTPATIENT
Start: 2019-05-15 | End: 2019-05-20

## 2019-05-14 PROBLEM — E78.5 HYPERLIPIDEMIA, UNSPECIFIED: Chronic | Status: ACTIVE | Noted: 2019-05-01

## 2019-05-14 PROBLEM — E66.9 OBESITY, UNSPECIFIED: Chronic | Status: ACTIVE | Noted: 2019-05-01

## 2019-05-14 PROBLEM — E11.9 TYPE 2 DIABETES MELLITUS WITHOUT COMPLICATIONS: Chronic | Status: ACTIVE | Noted: 2019-05-01

## 2019-05-14 PROBLEM — M17.0 BILATERAL PRIMARY OSTEOARTHRITIS OF KNEE: Chronic | Status: ACTIVE | Noted: 2019-05-01

## 2019-05-14 PROBLEM — R60.0 LOCALIZED EDEMA: Chronic | Status: ACTIVE | Noted: 2019-05-01

## 2019-05-15 ENCOUNTER — APPOINTMENT (OUTPATIENT)
Dept: ORTHOPEDIC SURGERY | Facility: HOSPITAL | Age: 74
End: 2019-05-15

## 2019-05-15 ENCOUNTER — INPATIENT (INPATIENT)
Facility: HOSPITAL | Age: 74
LOS: 6 days | Discharge: NURSING, REHAB OR LONG-TERM CARE | DRG: 462 | End: 2019-05-22
Attending: ORTHOPAEDIC SURGERY | Admitting: ORTHOPAEDIC SURGERY
Payer: MEDICARE

## 2019-05-15 ENCOUNTER — RESULT REVIEW (OUTPATIENT)
Age: 74
End: 2019-05-15

## 2019-05-15 VITALS
TEMPERATURE: 97 F | HEIGHT: 62 IN | DIASTOLIC BLOOD PRESSURE: 71 MMHG | HEART RATE: 55 BPM | WEIGHT: 177.69 LBS | OXYGEN SATURATION: 98 % | SYSTOLIC BLOOD PRESSURE: 157 MMHG | RESPIRATION RATE: 13 BRPM

## 2019-05-15 DIAGNOSIS — Z90.2 ACQUIRED ABSENCE OF LUNG [PART OF]: Chronic | ICD-10-CM

## 2019-05-15 DIAGNOSIS — E11.9 TYPE 2 DIABETES MELLITUS WITHOUT COMPLICATIONS: ICD-10-CM

## 2019-05-15 DIAGNOSIS — E78.5 HYPERLIPIDEMIA, UNSPECIFIED: ICD-10-CM

## 2019-05-15 DIAGNOSIS — I10 ESSENTIAL (PRIMARY) HYPERTENSION: ICD-10-CM

## 2019-05-15 DIAGNOSIS — M17.12 UNILATERAL PRIMARY OSTEOARTHRITIS, LEFT KNEE: ICD-10-CM

## 2019-05-15 DIAGNOSIS — M17.0 BILATERAL PRIMARY OSTEOARTHRITIS OF KNEE: ICD-10-CM

## 2019-05-15 LAB
ANION GAP SERPL CALC-SCNC: 9 MMOL/L — SIGNIFICANT CHANGE UP (ref 5–17)
APTT BLD: 24.8 SEC — LOW (ref 28.5–37)
BUN SERPL-MCNC: 15 MG/DL — SIGNIFICANT CHANGE UP (ref 7–23)
CALCIUM SERPL-MCNC: 8.9 MG/DL — SIGNIFICANT CHANGE UP (ref 8.4–10.5)
CHLORIDE SERPL-SCNC: 101 MMOL/L — SIGNIFICANT CHANGE UP (ref 96–108)
CO2 SERPL-SCNC: 30 MMOL/L — SIGNIFICANT CHANGE UP (ref 22–31)
CREAT SERPL-MCNC: 1.13 MG/DL — SIGNIFICANT CHANGE UP (ref 0.5–1.3)
GLUCOSE BLDC GLUCOMTR-MCNC: 114 MG/DL — HIGH (ref 70–99)
GLUCOSE BLDC GLUCOMTR-MCNC: 127 MG/DL — HIGH (ref 70–99)
GLUCOSE BLDC GLUCOMTR-MCNC: 155 MG/DL — HIGH (ref 70–99)
GLUCOSE BLDC GLUCOMTR-MCNC: 229 MG/DL — HIGH (ref 70–99)
GLUCOSE BLDC GLUCOMTR-MCNC: 342 MG/DL — HIGH (ref 70–99)
GLUCOSE SERPL-MCNC: 331 MG/DL — HIGH (ref 70–99)
HCT VFR BLD CALC: 39.2 % — SIGNIFICANT CHANGE UP (ref 34.5–45)
HGB BLD-MCNC: 13.3 G/DL — SIGNIFICANT CHANGE UP (ref 11.5–15.5)
MCHC RBC-ENTMCNC: 27.3 PG — SIGNIFICANT CHANGE UP (ref 27–34)
MCHC RBC-ENTMCNC: 33.9 GM/DL — SIGNIFICANT CHANGE UP (ref 32–36)
MCV RBC AUTO: 80.3 FL — SIGNIFICANT CHANGE UP (ref 80–100)
NRBC # BLD: 0 /100 WBCS — SIGNIFICANT CHANGE UP (ref 0–0)
PLATELET # BLD AUTO: 183 K/UL — SIGNIFICANT CHANGE UP (ref 150–400)
POTASSIUM SERPL-MCNC: 3.4 MMOL/L — LOW (ref 3.5–5.3)
POTASSIUM SERPL-SCNC: 3.4 MMOL/L — LOW (ref 3.5–5.3)
RBC # BLD: 4.88 M/UL — SIGNIFICANT CHANGE UP (ref 3.8–5.2)
RBC # FLD: 13.1 % — SIGNIFICANT CHANGE UP (ref 10.3–14.5)
SODIUM SERPL-SCNC: 140 MMOL/L — SIGNIFICANT CHANGE UP (ref 135–145)
WBC # BLD: 12.16 K/UL — HIGH (ref 3.8–10.5)
WBC # FLD AUTO: 12.16 K/UL — HIGH (ref 3.8–10.5)

## 2019-05-15 PROCEDURE — 27447 TOTAL KNEE ARTHROPLASTY: CPT | Mod: LT

## 2019-05-15 PROCEDURE — 88305 TISSUE EXAM BY PATHOLOGIST: CPT | Mod: 26

## 2019-05-15 PROCEDURE — 88311 DECALCIFY TISSUE: CPT | Mod: 26

## 2019-05-15 PROCEDURE — 99223 1ST HOSP IP/OBS HIGH 75: CPT

## 2019-05-15 PROCEDURE — 27447 TOTAL KNEE ARTHROPLASTY: CPT | Mod: 82,LT

## 2019-05-15 PROCEDURE — 73562 X-RAY EXAM OF KNEE 3: CPT | Mod: 26,LT

## 2019-05-15 RX ORDER — INSULIN LISPRO 100/ML
VIAL (ML) SUBCUTANEOUS AT BEDTIME
Refills: 0 | Status: DISCONTINUED | OUTPATIENT
Start: 2019-05-15 | End: 2019-05-20

## 2019-05-15 RX ORDER — ACETAMINOPHEN 500 MG
1000 TABLET ORAL EVERY 8 HOURS
Refills: 0 | Status: DISCONTINUED | OUTPATIENT
Start: 2019-05-16 | End: 2019-05-20

## 2019-05-15 RX ORDER — OXYCODONE HYDROCHLORIDE 5 MG/1
5 TABLET ORAL
Refills: 0 | Status: DISCONTINUED | OUTPATIENT
Start: 2019-05-15 | End: 2019-05-18

## 2019-05-15 RX ORDER — CHLORHEXIDINE GLUCONATE 213 G/1000ML
1 SOLUTION TOPICAL ONCE
Refills: 0 | Status: COMPLETED | OUTPATIENT
Start: 2019-05-15 | End: 2019-05-15

## 2019-05-15 RX ORDER — HYDROMORPHONE HYDROCHLORIDE 2 MG/ML
0.5 INJECTION INTRAMUSCULAR; INTRAVENOUS; SUBCUTANEOUS
Refills: 0 | Status: DISCONTINUED | OUTPATIENT
Start: 2019-05-15 | End: 2019-05-18

## 2019-05-15 RX ORDER — APREPITANT 80 MG/1
40 CAPSULE ORAL ONCE
Refills: 0 | Status: COMPLETED | OUTPATIENT
Start: 2019-05-15 | End: 2019-05-15

## 2019-05-15 RX ORDER — INSULIN GLARGINE 100 [IU]/ML
50 INJECTION, SOLUTION SUBCUTANEOUS EVERY MORNING
Refills: 0 | Status: DISCONTINUED | OUTPATIENT
Start: 2019-05-16 | End: 2019-05-19

## 2019-05-15 RX ORDER — OXYCODONE HYDROCHLORIDE 5 MG/1
10 TABLET ORAL
Refills: 0 | Status: DISCONTINUED | OUTPATIENT
Start: 2019-05-15 | End: 2019-05-18

## 2019-05-15 RX ORDER — CEFAZOLIN SODIUM 1 G
2000 VIAL (EA) INJECTION ONCE
Refills: 0 | Status: COMPLETED | OUTPATIENT
Start: 2019-05-15 | End: 2019-05-15

## 2019-05-15 RX ORDER — HYDROMORPHONE HYDROCHLORIDE 2 MG/ML
0.5 INJECTION INTRAMUSCULAR; INTRAVENOUS; SUBCUTANEOUS
Refills: 0 | Status: DISCONTINUED | OUTPATIENT
Start: 2019-05-15 | End: 2019-05-15

## 2019-05-15 RX ORDER — INSULIN LISPRO 100/ML
VIAL (ML) SUBCUTANEOUS
Refills: 0 | Status: DISCONTINUED | OUTPATIENT
Start: 2019-05-15 | End: 2019-05-20

## 2019-05-15 RX ORDER — INSULIN LISPRO 100/ML
5 VIAL (ML) SUBCUTANEOUS
Refills: 0 | Status: DISCONTINUED | OUTPATIENT
Start: 2019-05-15 | End: 2019-05-16

## 2019-05-15 RX ORDER — ENOXAPARIN SODIUM 100 MG/ML
30 INJECTION SUBCUTANEOUS EVERY 12 HOURS
Refills: 0 | Status: COMPLETED | OUTPATIENT
Start: 2019-05-16 | End: 2019-05-19

## 2019-05-15 RX ORDER — DEXTROSE 50 % IN WATER 50 %
25 SYRINGE (ML) INTRAVENOUS ONCE
Refills: 0 | Status: DISCONTINUED | OUTPATIENT
Start: 2019-05-15 | End: 2019-05-20

## 2019-05-15 RX ORDER — ANASTROZOLE 1 MG/1
1 TABLET ORAL DAILY
Refills: 0 | Status: DISCONTINUED | OUTPATIENT
Start: 2019-05-15 | End: 2019-05-20

## 2019-05-15 RX ORDER — CEFAZOLIN SODIUM 1 G
2000 VIAL (EA) INJECTION EVERY 8 HOURS
Refills: 0 | Status: COMPLETED | OUTPATIENT
Start: 2019-05-15 | End: 2019-05-16

## 2019-05-15 RX ORDER — DEXTROSE 50 % IN WATER 50 %
12.5 SYRINGE (ML) INTRAVENOUS ONCE
Refills: 0 | Status: DISCONTINUED | OUTPATIENT
Start: 2019-05-15 | End: 2019-05-20

## 2019-05-15 RX ORDER — INSULIN LISPRO 100/ML
8 VIAL (ML) SUBCUTANEOUS ONCE
Refills: 0 | Status: COMPLETED | OUTPATIENT
Start: 2019-05-15 | End: 2019-05-15

## 2019-05-15 RX ORDER — INSULIN LISPRO 100/ML
VIAL (ML) SUBCUTANEOUS
Refills: 0 | Status: DISCONTINUED | OUTPATIENT
Start: 2019-05-15 | End: 2019-05-15

## 2019-05-15 RX ORDER — SODIUM CHLORIDE 9 MG/ML
1000 INJECTION, SOLUTION INTRAVENOUS
Refills: 0 | Status: DISCONTINUED | OUTPATIENT
Start: 2019-05-15 | End: 2019-05-15

## 2019-05-15 RX ORDER — CARVEDILOL PHOSPHATE 80 MG/1
6.25 CAPSULE, EXTENDED RELEASE ORAL EVERY 12 HOURS
Refills: 0 | Status: DISCONTINUED | OUTPATIENT
Start: 2019-05-15 | End: 2019-05-20

## 2019-05-15 RX ORDER — ACETAMINOPHEN 500 MG
1000 TABLET ORAL ONCE
Refills: 0 | Status: COMPLETED | OUTPATIENT
Start: 2019-05-15 | End: 2019-05-15

## 2019-05-15 RX ORDER — ONDANSETRON 8 MG/1
4 TABLET, FILM COATED ORAL ONCE
Refills: 0 | Status: DISCONTINUED | OUTPATIENT
Start: 2019-05-15 | End: 2019-05-15

## 2019-05-15 RX ORDER — CELECOXIB 200 MG/1
200 CAPSULE ORAL
Refills: 0 | Status: COMPLETED | OUTPATIENT
Start: 2019-05-15 | End: 2019-05-18

## 2019-05-15 RX ORDER — GLUCAGON INJECTION, SOLUTION 0.5 MG/.1ML
1 INJECTION, SOLUTION SUBCUTANEOUS ONCE
Refills: 0 | Status: DISCONTINUED | OUTPATIENT
Start: 2019-05-15 | End: 2019-05-20

## 2019-05-15 RX ORDER — LOSARTAN POTASSIUM 100 MG/1
100 TABLET, FILM COATED ORAL DAILY
Refills: 0 | Status: DISCONTINUED | OUTPATIENT
Start: 2019-05-17 | End: 2019-05-20

## 2019-05-15 RX ORDER — SODIUM CHLORIDE 9 MG/ML
1000 INJECTION, SOLUTION INTRAVENOUS
Refills: 0 | Status: DISCONTINUED | OUTPATIENT
Start: 2019-05-15 | End: 2019-05-20

## 2019-05-15 RX ORDER — ATORVASTATIN CALCIUM 80 MG/1
10 TABLET, FILM COATED ORAL AT BEDTIME
Refills: 0 | Status: DISCONTINUED | OUTPATIENT
Start: 2019-05-15 | End: 2019-05-20

## 2019-05-15 RX ORDER — TRANEXAMIC ACID 100 MG/ML
1 INJECTION, SOLUTION INTRAVENOUS ONCE
Refills: 0 | Status: DISCONTINUED | OUTPATIENT
Start: 2019-05-15 | End: 2019-05-15

## 2019-05-15 RX ORDER — DEXTROSE 50 % IN WATER 50 %
15 SYRINGE (ML) INTRAVENOUS ONCE
Refills: 0 | Status: DISCONTINUED | OUTPATIENT
Start: 2019-05-15 | End: 2019-05-20

## 2019-05-15 RX ORDER — ACETAMINOPHEN 500 MG
1000 TABLET ORAL EVERY 6 HOURS
Refills: 0 | Status: COMPLETED | OUTPATIENT
Start: 2019-05-15 | End: 2019-05-16

## 2019-05-15 RX ADMIN — APREPITANT 40 MILLIGRAM(S): 80 CAPSULE ORAL at 08:51

## 2019-05-15 RX ADMIN — Medication 4: at 18:18

## 2019-05-15 RX ADMIN — CHLORHEXIDINE GLUCONATE 1 APPLICATION(S): 213 SOLUTION TOPICAL at 08:51

## 2019-05-15 RX ADMIN — Medication 0.2 MILLIGRAM(S): at 17:15

## 2019-05-15 RX ADMIN — Medication 400 MILLIGRAM(S): at 23:03

## 2019-05-15 RX ADMIN — Medication 8 UNIT(S): at 22:49

## 2019-05-15 RX ADMIN — CELECOXIB 200 MILLIGRAM(S): 200 CAPSULE ORAL at 22:09

## 2019-05-15 RX ADMIN — Medication 100 MILLIGRAM(S): at 22:08

## 2019-05-15 RX ADMIN — Medication 100 MILLIGRAM(S): at 18:19

## 2019-05-15 RX ADMIN — Medication 400 MILLIGRAM(S): at 17:13

## 2019-05-15 RX ADMIN — SODIUM CHLORIDE 100 MILLILITER(S): 9 INJECTION, SOLUTION INTRAVENOUS at 13:25

## 2019-05-15 RX ADMIN — Medication 5 UNIT(S): at 18:18

## 2019-05-15 RX ADMIN — Medication 1000 MILLIGRAM(S): at 17:47

## 2019-05-15 RX ADMIN — CARVEDILOL PHOSPHATE 6.25 MILLIGRAM(S): 80 CAPSULE, EXTENDED RELEASE ORAL at 17:14

## 2019-05-15 RX ADMIN — SENNA PLUS 2 TABLET(S): 8.6 TABLET ORAL at 22:08

## 2019-05-15 RX ADMIN — SODIUM CHLORIDE 100 MILLILITER(S): 9 INJECTION, SOLUTION INTRAVENOUS at 15:37

## 2019-05-15 RX ADMIN — ATORVASTATIN CALCIUM 10 MILLIGRAM(S): 80 TABLET, FILM COATED ORAL at 22:08

## 2019-05-15 RX ADMIN — CELECOXIB 200 MILLIGRAM(S): 200 CAPSULE ORAL at 22:44

## 2019-05-15 NOTE — CONSULT NOTE ADULT - PROBLEM SELECTOR RECOMMENDATION 9
Pain Management: acceptable- continue current care Tylenol ATC/Celebrex ATC/ Oxycodone PRN  Continue PT/OT  DVT proph: [  ] low risk - Aspirin  [ x ] high risk -Lovenox [  ] high risk - Eliquis [  ] other:_________  DC plan:  [  ] Home with HC  [  ] Rehab   [ x ] TBD  [  ]other:___________

## 2019-05-15 NOTE — CONSULT NOTE ADULT - SUBJECTIVE AND OBJECTIVE BOX
Patient is a 74y old  Female who presents with a chief complaint of bilateral knee pain    HPI: 75 y/o Female presented with pain bilateral knees; worse on left; for which she has tried physical therapy and cortisone injections without relief.   History of falls; cannot climb stairs, reports difficulty with getting off the bed or walking to the bathroom.  Pain rates 10/10. Takes Advil Liquid Gel with occasional relief.  She is now s/p Left TKR.  Pain is controlled.  She is concerned about her glucose.   She only took 20 units of Tresiba this morning as she was NPO and now she is about to eat a liquid tray.    She normally takes her novolog after her glucose starts going up after meals.  After breakfast her glucose goes up to 250 and then she takes 10 units.     REVIEW OF SYSTEMS:  CONSTITUTIONAL: No fever, weight loss, or fatigue  EYES: No eye pain, visual disturbances, or discharge  ENMT:  No difficulty hearing, tinnitus, vertigo; No sinus or throat pain  NECK: No pain or stiffness  BREASTS: No pain, masses, or nipple discharge  RESPIRATORY: No cough, wheezing, chills or hemoptysis; No shortness of breath  CARDIOVASCULAR: No chest pain, palpitations, dizziness, or leg swelling  GASTROINTESTINAL: No abdominal or epigastric pain. No nausea, vomiting, or hematemesis; No diarrhea or constipation. No melena or hematochezia.  GENITOURINARY: No dysuria, frequency, hematuria, or incontinence  NEUROLOGICAL: No headaches, memory loss, loss of strength, numbness, or tremors  SKIN: No itching, burning, rashes, or lesions   LYMPH NODES: No enlarged glands  ENDOCRINE: No heat or cold intolerance; No hair loss  MUSCULOSKELETAL: No muscle or back pain  PSYCHIATRIC: No depression, anxiety, mood swings, or difficulty sleeping  HEME/LYMPH: No easy bruising, or bleeding gums  ALLERGY AND IMMUNOLOGIC: No hives or eczema    PAST MEDICAL & SURGICAL HISTORY:  Pedal edema  Onychomycosis of toenail: 1st right toe  Obesity (BMI 30.0-34.9)  Osteoarthritis of both knees  Lung cancer: 2015, right lobe. treated with lobectomy  History of breast cancer: right, 2018 treated with radiation  Dyslipidemia  Diabetes mellitus, type 2  Obesity (BMI 30-39.9)  Urinary Urgency  Atrial Arrhythmia  Hypertension  History of lobectomy of lun, right upper lobe  History of D&C: 2007  Status Post Breast Lumpectomy: right 2018      SOCIAL HISTORY:  Residence: [ ] [facility]  [ ] SNF  [x ] Community  [ ] Substance abuse: denies  [ ] Tobacco: quit   [ ] Alcohol use: wine 2-3 per week    Allergies  No Known Drug Allergies  shellfish (Vomiting)    MEDICATIONS  (STANDING):  acetaminophen  IVPB .. 1000 milliGRAM(s) IV Intermittent every 6 hours  anastrozole 1 milliGRAM(s) Oral daily  atorvastatin 10 milliGRAM(s) Oral at bedtime  carvedilol 6.25 milliGRAM(s) Oral every 12 hours  ceFAZolin   IVPB 2000 milliGRAM(s) IV Intermittent every 8 hours  celecoxib 200 milliGRAM(s) Oral two times a day  cloNIDine 0.2 milliGRAM(s) Oral two times a day  dextrose 5%. 1000 milliLiter(s) (50 mL/Hr) IV Continuous <Continuous>  dextrose 5%. 1000 milliLiter(s) (50 mL/Hr) IV Continuous <Continuous>  dextrose 50% Injectable 12.5 Gram(s) IV Push once  dextrose 50% Injectable 25 Gram(s) IV Push once  dextrose 50% Injectable 25 Gram(s) IV Push once  dextrose 50% Injectable 12.5 Gram(s) IV Push once  dextrose 50% Injectable 25 Gram(s) IV Push once  dextrose 50% Injectable 25 Gram(s) IV Push once  docusate sodium 100 milliGRAM(s) Oral three times a day  insulin glargine Injectable (LANTUS) 50 Unit(s) SubCutaneous every morning  insulin lispro (HumaLOG) corrective regimen sliding scale   SubCutaneous three times a day before meals  insulin lispro (HumaLOG) corrective regimen sliding scale   SubCutaneous at bedtime  insulin lispro Injectable (HumaLOG) 5 Unit(s) SubCutaneous three times a day before meals  lactated ringers. 1000 milliLiter(s) (75 mL/Hr) IV Continuous <Continuous>  lactated ringers. 1000 milliLiter(s) (125 mL/Hr) IV Continuous <Continuous>  pantoprazole    Tablet 40 milliGRAM(s) Oral before breakfast  senna 2 Tablet(s) Oral at bedtime    MEDICATIONS  (PRN):  aluminum hydroxide/magnesium hydroxide/simethicone Suspension 30 milliLiter(s) Oral four times a day PRN Indigestion  dextrose 40% Gel 15 Gram(s) Oral once PRN Blood Glucose LESS THAN 70 milliGRAM(s)/deciliter  dextrose 40% Gel 15 Gram(s) Oral once PRN Blood Glucose LESS THAN 70 milliGRAM(s)/deciLiter  glucagon  Injectable 1 milliGRAM(s) IntraMuscular once PRN Glucose <70 milliGRAM(s)/deciLiter  glucagon  Injectable 1 milliGRAM(s) IntraMuscular once PRN Glucose LESS THAN 70 milligrams/deciliter  HYDROmorphone  Injectable 0.5 milliGRAM(s) IV Push every 3 hours PRN Severe Pain (7 - 10)  magnesium hydroxide Suspension 30 milliLiter(s) Oral daily PRN Constipation  ondansetron Injectable 4 milliGRAM(s) IV Push every 6 hours PRN Nausea and/or Vomiting  oxyCODONE    IR 5 milliGRAM(s) Oral every 3 hours PRN Mild Pain (1 - 3)  oxyCODONE    IR 10 milliGRAM(s) Oral every 3 hours PRN Moderate Pain (4 - 6)  polyethylene glycol 3350 17 Gram(s) Oral daily PRN Constipation      FAMILY HISTORY:  Family history of heart attack (Father)  Family history of breast cancer (Mother, Sibling)      Vital Signs Last 24 Hrs  T(C): 36.6 (15 May 2019 15:45), Max: 36.6 (15 May 2019 15:45)  T(F): 97.9 (15 May 2019 15:45), Max: 97.9 (15 May 2019 15:45)  HR: 71 (15 May 2019 15:45) (55 - 71)  BP: 160/75 (15 May 2019 15:45) (63/74 - 180/67)  BP(mean): --  RR: 15 (15 May 2019 15:45) (13 - 19)  SpO2: 100% (15 May 2019 15:45) (93% - 100%)    PHYSICAL EXAM:    GENERAL: NAD, well-groomed, well-developed  HEAD:  Atraumatic, Normocephalic  EYES: EOMI, PERRLA, conjunctiva and sclera clear  ENMT: Moist mucous membranes  NECK: Supple, No JVD  NERVOUS SYSTEM:  Alert & Oriented X3, Good concentration; Moving all 4 extremities; No gross sensory deficits  CHEST/LUNG: Clear to auscultation bilaterally; No rales, rhonchi, wheezing, or rubs  HEART: Regular rate and rhythm; No murmurs, rubs, or gallops  ABDOMEN: Soft, Nontender, Nondistended; Bowel sounds present  EXTREMITIES:  2+ Peripheral Pulses, No clubbing, cyanosis, or edema  LYMPH: No lymphadenopathy noted  /RECTAL: Not examined  BREAST: Not examined  SKIN: No rashes or lesions  INCISION: dressing dry and intact    LABS:          PTT - ( 15 May 2019 09:08 )  PTT:24.8 sec    CAPILLARY BLOOD GLUCOSE      POCT Blood Glucose.: 155 mg/dL (15 May 2019 16:41)      RADIOLOGY & ADDITIONAL STUDIES:    EKG: NSR, lateral T wave inversion - chronic  Personally Reviewed:  [x ] YES     Imaging: TKR in place  Personally Reviewed:  [x ] YES     Consultant(s) Notes Reviewed:  pre-op cardiac clearance in place    Care Discussed with Consultants/Other Providers: reviewed insulin orders with RN

## 2019-05-15 NOTE — PATIENT PROFILE ADULT - DO YOU FEEL UNSAFE AT SCHOOL?
Rosa Mcclure   5/1/2018   Anticoagulation Therapy Visit    Description:  73 year old female   Provider:  Suzanna Parnell, RN   [facility]           INR as of 5/1/2018     Today's INR       Anticoagulation Summary as of 5/1/2018     INR goal 2.0-3.0   Today's INR    Next INR check     Indications   Long-term (current) use of anticoagulants [Z79.01] [Z79.01]  Atrial fibrillation (H) [I48.91] [I48.91]         May 2018 Details    Sun Mon Tue Wed Thu Fri Sat       1      8 mg   See details      2      6 mg         3      6 mg         4      8 mg         5      6 mg           6      6 mg         7            8               9               10               11               12                 13               14               15               16               17               18               19                 20               21               22               23               24               25               26                 27               28               29               30               31                  Date Details   05/01 This INR check       Date of next INR:  5/7/2018         How to take your warfarin dose     To take:  6 mg Take 1.5 of the 4 mg tablets.    To take:  8 mg Take 2 of the 4 mg tablets.           
not applicable

## 2019-05-15 NOTE — CONSULT NOTE ADULT - PROBLEM SELECTOR RECOMMENDATION 2
will order basal insulin - Lantus 50 units q8am.   premeal - humalog 5 units, although likely needs more, will adjust after checking FS  plus moderate dose premeal and HS coverage.  Patient does not understand use of premeal humalog, some education provided but will also have CDE consult in AM.

## 2019-05-15 NOTE — PHYSICAL THERAPY INITIAL EVALUATION ADULT - ACTIVE RANGE OF MOTION EXAMINATION, REHAB EVAL
kaitlyn. upper extremity Active ROM was WNL (within normal limits)/bilateral lower extremity Active ROM was WNL (within normal limits)

## 2019-05-16 LAB
ANION GAP SERPL CALC-SCNC: 12 MMOL/L — SIGNIFICANT CHANGE UP (ref 5–17)
BUN SERPL-MCNC: 14 MG/DL — SIGNIFICANT CHANGE UP (ref 7–23)
CALCIUM SERPL-MCNC: 9.1 MG/DL — SIGNIFICANT CHANGE UP (ref 8.4–10.5)
CHLORIDE SERPL-SCNC: 104 MMOL/L — SIGNIFICANT CHANGE UP (ref 96–108)
CO2 SERPL-SCNC: 29 MMOL/L — SIGNIFICANT CHANGE UP (ref 22–31)
CREAT SERPL-MCNC: 0.96 MG/DL — SIGNIFICANT CHANGE UP (ref 0.5–1.3)
GLUCOSE BLDC GLUCOMTR-MCNC: 171 MG/DL — HIGH (ref 70–99)
GLUCOSE BLDC GLUCOMTR-MCNC: 178 MG/DL — HIGH (ref 70–99)
GLUCOSE BLDC GLUCOMTR-MCNC: 200 MG/DL — HIGH (ref 70–99)
GLUCOSE BLDC GLUCOMTR-MCNC: 219 MG/DL — HIGH (ref 70–99)
GLUCOSE BLDC GLUCOMTR-MCNC: 260 MG/DL — HIGH (ref 70–99)
GLUCOSE SERPL-MCNC: 198 MG/DL — HIGH (ref 70–99)
HCT VFR BLD CALC: 35.6 % — SIGNIFICANT CHANGE UP (ref 34.5–45)
HGB BLD-MCNC: 12.5 G/DL — SIGNIFICANT CHANGE UP (ref 11.5–15.5)
MCHC RBC-ENTMCNC: 28.2 PG — SIGNIFICANT CHANGE UP (ref 27–34)
MCHC RBC-ENTMCNC: 35.1 GM/DL — SIGNIFICANT CHANGE UP (ref 32–36)
MCV RBC AUTO: 80.4 FL — SIGNIFICANT CHANGE UP (ref 80–100)
NRBC # BLD: 0 /100 WBCS — SIGNIFICANT CHANGE UP (ref 0–0)
PLATELET # BLD AUTO: 179 K/UL — SIGNIFICANT CHANGE UP (ref 150–400)
POTASSIUM SERPL-MCNC: 3.5 MMOL/L — SIGNIFICANT CHANGE UP (ref 3.5–5.3)
POTASSIUM SERPL-SCNC: 3.5 MMOL/L — SIGNIFICANT CHANGE UP (ref 3.5–5.3)
RBC # BLD: 4.43 M/UL — SIGNIFICANT CHANGE UP (ref 3.8–5.2)
RBC # FLD: 13 % — SIGNIFICANT CHANGE UP (ref 10.3–14.5)
SODIUM SERPL-SCNC: 145 MMOL/L — SIGNIFICANT CHANGE UP (ref 135–145)
WBC # BLD: 12.74 K/UL — HIGH (ref 3.8–10.5)
WBC # FLD AUTO: 12.74 K/UL — HIGH (ref 3.8–10.5)

## 2019-05-16 PROCEDURE — 99233 SBSQ HOSP IP/OBS HIGH 50: CPT

## 2019-05-16 RX ORDER — INSULIN LISPRO 100/ML
4 VIAL (ML) SUBCUTANEOUS ONCE
Refills: 0 | Status: COMPLETED | OUTPATIENT
Start: 2019-05-16 | End: 2019-05-16

## 2019-05-16 RX ORDER — INSULIN LISPRO 100/ML
10 VIAL (ML) SUBCUTANEOUS
Refills: 0 | Status: DISCONTINUED | OUTPATIENT
Start: 2019-05-16 | End: 2019-05-20

## 2019-05-16 RX ADMIN — Medication 1000 MILLIGRAM(S): at 19:30

## 2019-05-16 RX ADMIN — Medication 100 MILLIGRAM(S): at 12:31

## 2019-05-16 RX ADMIN — CELECOXIB 200 MILLIGRAM(S): 200 CAPSULE ORAL at 09:03

## 2019-05-16 RX ADMIN — CELECOXIB 200 MILLIGRAM(S): 200 CAPSULE ORAL at 21:09

## 2019-05-16 RX ADMIN — Medication 10 UNIT(S): at 17:12

## 2019-05-16 RX ADMIN — Medication 4 UNIT(S): at 03:49

## 2019-05-16 RX ADMIN — INSULIN GLARGINE 50 UNIT(S): 100 INJECTION, SOLUTION SUBCUTANEOUS at 08:32

## 2019-05-16 RX ADMIN — Medication 0.2 MILLIGRAM(S): at 06:03

## 2019-05-16 RX ADMIN — Medication 100 MILLIGRAM(S): at 03:00

## 2019-05-16 RX ADMIN — CARVEDILOL PHOSPHATE 6.25 MILLIGRAM(S): 80 CAPSULE, EXTENDED RELEASE ORAL at 17:14

## 2019-05-16 RX ADMIN — Medication 1000 MILLIGRAM(S): at 12:27

## 2019-05-16 RX ADMIN — ENOXAPARIN SODIUM 30 MILLIGRAM(S): 100 INJECTION SUBCUTANEOUS at 21:10

## 2019-05-16 RX ADMIN — Medication 1000 MILLIGRAM(S): at 00:00

## 2019-05-16 RX ADMIN — HYDROMORPHONE HYDROCHLORIDE 0.5 MILLIGRAM(S): 2 INJECTION INTRAMUSCULAR; INTRAVENOUS; SUBCUTANEOUS at 20:55

## 2019-05-16 RX ADMIN — HYDROMORPHONE HYDROCHLORIDE 0.5 MILLIGRAM(S): 2 INJECTION INTRAMUSCULAR; INTRAVENOUS; SUBCUTANEOUS at 21:10

## 2019-05-16 RX ADMIN — CELECOXIB 200 MILLIGRAM(S): 200 CAPSULE ORAL at 09:02

## 2019-05-16 RX ADMIN — Medication 1000 MILLIGRAM(S): at 06:39

## 2019-05-16 RX ADMIN — Medication 100 MILLIGRAM(S): at 06:03

## 2019-05-16 RX ADMIN — SENNA PLUS 2 TABLET(S): 8.6 TABLET ORAL at 21:09

## 2019-05-16 RX ADMIN — Medication 5 UNIT(S): at 12:29

## 2019-05-16 RX ADMIN — ATORVASTATIN CALCIUM 10 MILLIGRAM(S): 80 TABLET, FILM COATED ORAL at 21:10

## 2019-05-16 RX ADMIN — Medication 400 MILLIGRAM(S): at 06:03

## 2019-05-16 RX ADMIN — PANTOPRAZOLE SODIUM 40 MILLIGRAM(S): 20 TABLET, DELAYED RELEASE ORAL at 06:03

## 2019-05-16 RX ADMIN — CARVEDILOL PHOSPHATE 6.25 MILLIGRAM(S): 80 CAPSULE, EXTENDED RELEASE ORAL at 06:03

## 2019-05-16 RX ADMIN — Medication 0.2 MILLIGRAM(S): at 17:14

## 2019-05-16 RX ADMIN — Medication 6: at 12:30

## 2019-05-16 RX ADMIN — Medication 1000 MILLIGRAM(S): at 12:30

## 2019-05-16 RX ADMIN — Medication 100 MILLIGRAM(S): at 21:09

## 2019-05-16 RX ADMIN — Medication 2: at 17:13

## 2019-05-16 RX ADMIN — OXYCODONE HYDROCHLORIDE 5 MILLIGRAM(S): 5 TABLET ORAL at 18:38

## 2019-05-16 RX ADMIN — Medication 2: at 08:26

## 2019-05-16 RX ADMIN — Medication 5 UNIT(S): at 08:27

## 2019-05-16 RX ADMIN — OXYCODONE HYDROCHLORIDE 5 MILLIGRAM(S): 5 TABLET ORAL at 19:21

## 2019-05-16 RX ADMIN — ENOXAPARIN SODIUM 30 MILLIGRAM(S): 100 INJECTION SUBCUTANEOUS at 09:04

## 2019-05-16 RX ADMIN — CELECOXIB 200 MILLIGRAM(S): 200 CAPSULE ORAL at 21:10

## 2019-05-16 NOTE — PROVIDER CONTACT NOTE (MEDICATION) - BACKGROUND
Pt knows her body well and self adjusts insulin dosing as she prefers.   Pt FS= 342  Pt refusing indicated dosing amount, stating that it will not do anything for her  pt to receive 50 U lantus in am

## 2019-05-17 LAB
ANION GAP SERPL CALC-SCNC: 6 MMOL/L — SIGNIFICANT CHANGE UP (ref 5–17)
BUN SERPL-MCNC: 20 MG/DL — SIGNIFICANT CHANGE UP (ref 7–23)
CALCIUM SERPL-MCNC: 8.6 MG/DL — SIGNIFICANT CHANGE UP (ref 8.4–10.5)
CHLORIDE SERPL-SCNC: 103 MMOL/L — SIGNIFICANT CHANGE UP (ref 96–108)
CO2 SERPL-SCNC: 31 MMOL/L — SIGNIFICANT CHANGE UP (ref 22–31)
CREAT SERPL-MCNC: 0.81 MG/DL — SIGNIFICANT CHANGE UP (ref 0.5–1.3)
GLUCOSE BLDC GLUCOMTR-MCNC: 125 MG/DL — HIGH (ref 70–99)
GLUCOSE BLDC GLUCOMTR-MCNC: 135 MG/DL — HIGH (ref 70–99)
GLUCOSE BLDC GLUCOMTR-MCNC: 142 MG/DL — HIGH (ref 70–99)
GLUCOSE BLDC GLUCOMTR-MCNC: 163 MG/DL — HIGH (ref 70–99)
GLUCOSE BLDC GLUCOMTR-MCNC: 177 MG/DL — HIGH (ref 70–99)
GLUCOSE SERPL-MCNC: 138 MG/DL — HIGH (ref 70–99)
HCT VFR BLD CALC: 31.7 % — LOW (ref 34.5–45)
HGB BLD-MCNC: 10.7 G/DL — LOW (ref 11.5–15.5)
MCHC RBC-ENTMCNC: 27 PG — SIGNIFICANT CHANGE UP (ref 27–34)
MCHC RBC-ENTMCNC: 33.8 GM/DL — SIGNIFICANT CHANGE UP (ref 32–36)
MCV RBC AUTO: 79.8 FL — LOW (ref 80–100)
NRBC # BLD: 0 /100 WBCS — SIGNIFICANT CHANGE UP (ref 0–0)
PLATELET # BLD AUTO: 164 K/UL — SIGNIFICANT CHANGE UP (ref 150–400)
POTASSIUM SERPL-MCNC: 3.4 MMOL/L — LOW (ref 3.5–5.3)
POTASSIUM SERPL-SCNC: 3.4 MMOL/L — LOW (ref 3.5–5.3)
RBC # BLD: 3.97 M/UL — SIGNIFICANT CHANGE UP (ref 3.8–5.2)
RBC # FLD: 13.2 % — SIGNIFICANT CHANGE UP (ref 10.3–14.5)
SODIUM SERPL-SCNC: 140 MMOL/L — SIGNIFICANT CHANGE UP (ref 135–145)
WBC # BLD: 8.85 K/UL — SIGNIFICANT CHANGE UP (ref 3.8–10.5)
WBC # FLD AUTO: 8.85 K/UL — SIGNIFICANT CHANGE UP (ref 3.8–10.5)

## 2019-05-17 PROCEDURE — 99233 SBSQ HOSP IP/OBS HIGH 50: CPT

## 2019-05-17 PROCEDURE — 99232 SBSQ HOSP IP/OBS MODERATE 35: CPT

## 2019-05-17 RX ORDER — POTASSIUM CHLORIDE 20 MEQ
40 PACKET (EA) ORAL ONCE
Refills: 0 | Status: COMPLETED | OUTPATIENT
Start: 2019-05-17 | End: 2019-05-17

## 2019-05-17 RX ADMIN — Medication 10 UNIT(S): at 19:12

## 2019-05-17 RX ADMIN — Medication 10 UNIT(S): at 12:28

## 2019-05-17 RX ADMIN — POLYETHYLENE GLYCOL 3350 17 GRAM(S): 17 POWDER, FOR SOLUTION ORAL at 16:47

## 2019-05-17 RX ADMIN — HYDROMORPHONE HYDROCHLORIDE 0.5 MILLIGRAM(S): 2 INJECTION INTRAMUSCULAR; INTRAVENOUS; SUBCUTANEOUS at 00:15

## 2019-05-17 RX ADMIN — Medication 0.2 MILLIGRAM(S): at 19:12

## 2019-05-17 RX ADMIN — CELECOXIB 200 MILLIGRAM(S): 200 CAPSULE ORAL at 23:30

## 2019-05-17 RX ADMIN — Medication 1000 MILLIGRAM(S): at 19:12

## 2019-05-17 RX ADMIN — Medication 0.2 MILLIGRAM(S): at 05:51

## 2019-05-17 RX ADMIN — ATORVASTATIN CALCIUM 10 MILLIGRAM(S): 80 TABLET, FILM COATED ORAL at 22:27

## 2019-05-17 RX ADMIN — Medication 1000 MILLIGRAM(S): at 12:29

## 2019-05-17 RX ADMIN — CELECOXIB 200 MILLIGRAM(S): 200 CAPSULE ORAL at 22:27

## 2019-05-17 RX ADMIN — ENOXAPARIN SODIUM 30 MILLIGRAM(S): 100 INJECTION SUBCUTANEOUS at 09:35

## 2019-05-17 RX ADMIN — CARVEDILOL PHOSPHATE 6.25 MILLIGRAM(S): 80 CAPSULE, EXTENDED RELEASE ORAL at 19:12

## 2019-05-17 RX ADMIN — ENOXAPARIN SODIUM 30 MILLIGRAM(S): 100 INJECTION SUBCUTANEOUS at 22:27

## 2019-05-17 RX ADMIN — OXYCODONE HYDROCHLORIDE 10 MILLIGRAM(S): 5 TABLET ORAL at 19:45

## 2019-05-17 RX ADMIN — Medication 100 MILLIGRAM(S): at 05:50

## 2019-05-17 RX ADMIN — OXYCODONE HYDROCHLORIDE 10 MILLIGRAM(S): 5 TABLET ORAL at 05:27

## 2019-05-17 RX ADMIN — Medication 40 MILLIEQUIVALENT(S): at 13:31

## 2019-05-17 RX ADMIN — Medication 1000 MILLIGRAM(S): at 19:13

## 2019-05-17 RX ADMIN — CELECOXIB 200 MILLIGRAM(S): 200 CAPSULE ORAL at 09:35

## 2019-05-17 RX ADMIN — Medication 1000 MILLIGRAM(S): at 12:31

## 2019-05-17 RX ADMIN — SENNA PLUS 2 TABLET(S): 8.6 TABLET ORAL at 22:27

## 2019-05-17 RX ADMIN — Medication 100 MILLIGRAM(S): at 16:45

## 2019-05-17 RX ADMIN — Medication 100 MILLIGRAM(S): at 22:27

## 2019-05-17 RX ADMIN — HYDROMORPHONE HYDROCHLORIDE 0.5 MILLIGRAM(S): 2 INJECTION INTRAMUSCULAR; INTRAVENOUS; SUBCUTANEOUS at 00:30

## 2019-05-17 RX ADMIN — PANTOPRAZOLE SODIUM 40 MILLIGRAM(S): 20 TABLET, DELAYED RELEASE ORAL at 05:51

## 2019-05-17 RX ADMIN — INSULIN GLARGINE 50 UNIT(S): 100 INJECTION, SOLUTION SUBCUTANEOUS at 08:07

## 2019-05-17 RX ADMIN — LOSARTAN POTASSIUM 100 MILLIGRAM(S): 100 TABLET, FILM COATED ORAL at 05:50

## 2019-05-17 RX ADMIN — OXYCODONE HYDROCHLORIDE 10 MILLIGRAM(S): 5 TABLET ORAL at 19:12

## 2019-05-17 RX ADMIN — Medication 1000 MILLIGRAM(S): at 03:30

## 2019-05-17 RX ADMIN — CARVEDILOL PHOSPHATE 6.25 MILLIGRAM(S): 80 CAPSULE, EXTENDED RELEASE ORAL at 05:50

## 2019-05-17 RX ADMIN — Medication 1000 MILLIGRAM(S): at 03:31

## 2019-05-17 RX ADMIN — OXYCODONE HYDROCHLORIDE 10 MILLIGRAM(S): 5 TABLET ORAL at 05:57

## 2019-05-17 RX ADMIN — Medication 10 UNIT(S): at 08:06

## 2019-05-17 NOTE — ADVANCED PRACTICE NURSE CONSULT - ASSESSMENT
Patient is a 74y old  Female who presents with Type 2 DM    ROS:  Cardiovascular: No chest Pain, palpitations  Respiratory No SOB, No cough  GI: No nausea,Vomiting,abdominal pain  Endocrine: no polyuria,polydipsia      Vital Signs Last 24 Hrs  T(F): 97.6 (17 May 2019 07:39), Max: 98.3   HR: 76   BP: 146/84   RR: 18  SpO2: 94%     Physical Exam:  General:NAD well groomed well developed   Cardiovascular Regular rate and rhythm  REspiratory: easy and unlabored  Psych: Alert and oriented x3 normal affect normal mood        PAST MEDICAL & SURGICAL HISTORY:  Onychomycosis of toenail: 1st right toe  Obesity   Osteoarthritis of both knees  Lung cancer: 2015, right lobe. treated with lobectomy  History of breast cancer: right, 2018 treated with radiation  Dyslipidemia  Diabetes mellitus, type 2  Obesity   Urinary Urgency  Atrial Arrhythmia  Hypertension  History of lobectomy of lun, right upper lobe  Status Post Breast Lumpectomy: right 2018        No Known Drug Allergies  shellfish (Vomiting)      DM MEDICATIONS  (STANDING):  dextrose 5%. 1000 milliLiter(s) (50 mL/Hr) IV Continuous <Continuous>  dextrose 5%. 1000 milliLiter(s) (50 mL/Hr) IV Continuous <Continuous>  dextrose 50% Injectable 12.5 Gram(s) IV Push once  dextrose 50% Injectable 25 Gram(s) IV Push once  dextrose 50% Injectable 25 Gram(s) IV Push once  dextrose 50% Injectable 12.5 Gram(s) IV Push once  dextrose 50% Injectable 25 Gram(s) IV Push once  dextrose 50% Injectable 25 Gram(s) IV Push once  insulin glargine Injectable (LANTUS) 50 Unit(s) SubCutaneous every morning  insulin lispro (HumaLOG) corrective regimen sliding scale   SubCutaneous three times a day before meals  insulin lispro (HumaLOG) corrective regimen sliding scale   SubCutaneous at bedtime  insulin lispro Injectable (HumaLOG) 10 Unit(s) SubCutaneous three times a day before meals  lactated ringers. 1000 milliLiter(s) (75 mL/Hr)   lactated ringers. 1000 milliLiter(s) (125 mL/Hr)       DM Home Medications:  HumaLOG 100 units/mL subcutaneous solution: 10 unit(s) subcutaneous 3 times a day, As Needed (15 May 2019 08:47)  Tresiba FlexTouch 200 units/mL subcutaneous solution: 50 unit(s) subcutaneous once a day (15 May 2019 08:47)      Diet: Consistant Carbohydrates    A1c: 7%              140  |  103  |  20  ----------------------------<  138<H>  3.4<L>   |  31  |  0.81    Ca    8.6      17 May 2019 07:22      POCT Blood Glucose.: 142 mg/dL (17 May 2019 12:03)  POCT Blood Glucose.: 135 mg/dL (17 May 2019 07:32)  POCT Blood Glucose.: 200 mg/dL (16 May 2019 21:16)  POCT Blood Glucose.: 171 mg/dL (16 May 2019 16:17)  POCT Blood Glucose.: 260 mg/dL (16 May 2019 12:05)  POCT Blood Glucose.: 178 mg/dL (16 May 2019 07:42)  POCT Blood Glucose.: 219 mg/dL (16 May 2019 02:57)

## 2019-05-17 NOTE — ADVANCED PRACTICE NURSE CONSULT - RECOMMEDATIONS
Monitor Glucose Trends  Goal range  100 to 180mg/dl  Home glucose monitoring  diabetes survival skills

## 2019-05-18 LAB
ANION GAP SERPL CALC-SCNC: 7 MMOL/L — SIGNIFICANT CHANGE UP (ref 5–17)
BUN SERPL-MCNC: 17 MG/DL — SIGNIFICANT CHANGE UP (ref 7–23)
CALCIUM SERPL-MCNC: 8.6 MG/DL — SIGNIFICANT CHANGE UP (ref 8.4–10.5)
CHLORIDE SERPL-SCNC: 107 MMOL/L — SIGNIFICANT CHANGE UP (ref 96–108)
CO2 SERPL-SCNC: 33 MMOL/L — HIGH (ref 22–31)
CREAT SERPL-MCNC: 0.84 MG/DL — SIGNIFICANT CHANGE UP (ref 0.5–1.3)
GLUCOSE BLDC GLUCOMTR-MCNC: 113 MG/DL — HIGH (ref 70–99)
GLUCOSE BLDC GLUCOMTR-MCNC: 121 MG/DL — HIGH (ref 70–99)
GLUCOSE BLDC GLUCOMTR-MCNC: 126 MG/DL — HIGH (ref 70–99)
GLUCOSE BLDC GLUCOMTR-MCNC: 128 MG/DL — HIGH (ref 70–99)
GLUCOSE BLDC GLUCOMTR-MCNC: 84 MG/DL — SIGNIFICANT CHANGE UP (ref 70–99)
GLUCOSE SERPL-MCNC: 125 MG/DL — HIGH (ref 70–99)
HCT VFR BLD CALC: 30.8 % — LOW (ref 34.5–45)
HGB BLD-MCNC: 10.2 G/DL — LOW (ref 11.5–15.5)
MCHC RBC-ENTMCNC: 27 PG — SIGNIFICANT CHANGE UP (ref 27–34)
MCHC RBC-ENTMCNC: 33.1 GM/DL — SIGNIFICANT CHANGE UP (ref 32–36)
MCV RBC AUTO: 81.5 FL — SIGNIFICANT CHANGE UP (ref 80–100)
NRBC # BLD: 0 /100 WBCS — SIGNIFICANT CHANGE UP (ref 0–0)
PLATELET # BLD AUTO: 155 K/UL — SIGNIFICANT CHANGE UP (ref 150–400)
POTASSIUM SERPL-MCNC: 3.8 MMOL/L — SIGNIFICANT CHANGE UP (ref 3.5–5.3)
POTASSIUM SERPL-SCNC: 3.8 MMOL/L — SIGNIFICANT CHANGE UP (ref 3.5–5.3)
RBC # BLD: 3.78 M/UL — LOW (ref 3.8–5.2)
RBC # FLD: 13.3 % — SIGNIFICANT CHANGE UP (ref 10.3–14.5)
SODIUM SERPL-SCNC: 147 MMOL/L — HIGH (ref 135–145)
WBC # BLD: 6.8 K/UL — SIGNIFICANT CHANGE UP (ref 3.8–10.5)
WBC # FLD AUTO: 6.8 K/UL — SIGNIFICANT CHANGE UP (ref 3.8–10.5)

## 2019-05-18 PROCEDURE — 93970 EXTREMITY STUDY: CPT | Mod: 26

## 2019-05-18 PROCEDURE — 99232 SBSQ HOSP IP/OBS MODERATE 35: CPT

## 2019-05-18 RX ORDER — OXYCODONE HYDROCHLORIDE 5 MG/1
5 TABLET ORAL EVERY 4 HOURS
Refills: 0 | Status: DISCONTINUED | OUTPATIENT
Start: 2019-05-18 | End: 2019-05-20

## 2019-05-18 RX ADMIN — CELECOXIB 200 MILLIGRAM(S): 200 CAPSULE ORAL at 21:20

## 2019-05-18 RX ADMIN — CARVEDILOL PHOSPHATE 6.25 MILLIGRAM(S): 80 CAPSULE, EXTENDED RELEASE ORAL at 17:33

## 2019-05-18 RX ADMIN — CARVEDILOL PHOSPHATE 6.25 MILLIGRAM(S): 80 CAPSULE, EXTENDED RELEASE ORAL at 05:47

## 2019-05-18 RX ADMIN — SENNA PLUS 2 TABLET(S): 8.6 TABLET ORAL at 21:21

## 2019-05-18 RX ADMIN — PANTOPRAZOLE SODIUM 40 MILLIGRAM(S): 20 TABLET, DELAYED RELEASE ORAL at 05:47

## 2019-05-18 RX ADMIN — CELECOXIB 200 MILLIGRAM(S): 200 CAPSULE ORAL at 05:08

## 2019-05-18 RX ADMIN — Medication 0.2 MILLIGRAM(S): at 17:33

## 2019-05-18 RX ADMIN — Medication 1000 MILLIGRAM(S): at 12:39

## 2019-05-18 RX ADMIN — Medication 100 MILLIGRAM(S): at 21:20

## 2019-05-18 RX ADMIN — ENOXAPARIN SODIUM 30 MILLIGRAM(S): 100 INJECTION SUBCUTANEOUS at 07:50

## 2019-05-18 RX ADMIN — Medication 1000 MILLIGRAM(S): at 12:36

## 2019-05-18 RX ADMIN — Medication 100 MILLIGRAM(S): at 05:47

## 2019-05-18 RX ADMIN — ATORVASTATIN CALCIUM 10 MILLIGRAM(S): 80 TABLET, FILM COATED ORAL at 21:20

## 2019-05-18 RX ADMIN — Medication 1000 MILLIGRAM(S): at 18:41

## 2019-05-18 RX ADMIN — Medication 10 UNIT(S): at 07:49

## 2019-05-18 RX ADMIN — CELECOXIB 200 MILLIGRAM(S): 200 CAPSULE ORAL at 07:53

## 2019-05-18 RX ADMIN — Medication 5 MILLIGRAM(S): at 06:25

## 2019-05-18 RX ADMIN — Medication 1000 MILLIGRAM(S): at 18:44

## 2019-05-18 RX ADMIN — Medication 5 MILLIGRAM(S): at 11:51

## 2019-05-18 RX ADMIN — Medication 10 UNIT(S): at 12:48

## 2019-05-18 RX ADMIN — ENOXAPARIN SODIUM 30 MILLIGRAM(S): 100 INJECTION SUBCUTANEOUS at 21:23

## 2019-05-18 RX ADMIN — CELECOXIB 200 MILLIGRAM(S): 200 CAPSULE ORAL at 07:50

## 2019-05-18 RX ADMIN — INSULIN GLARGINE 50 UNIT(S): 100 INJECTION, SOLUTION SUBCUTANEOUS at 07:48

## 2019-05-18 RX ADMIN — LOSARTAN POTASSIUM 100 MILLIGRAM(S): 100 TABLET, FILM COATED ORAL at 05:47

## 2019-05-18 RX ADMIN — Medication 0.2 MILLIGRAM(S): at 05:47

## 2019-05-19 LAB
ANION GAP SERPL CALC-SCNC: 6 MMOL/L — SIGNIFICANT CHANGE UP (ref 5–17)
APTT BLD: 30.7 SEC — SIGNIFICANT CHANGE UP (ref 28.5–37)
BLD GP AB SCN SERPL QL: SIGNIFICANT CHANGE UP
BUN SERPL-MCNC: 12 MG/DL — SIGNIFICANT CHANGE UP (ref 7–23)
CALCIUM SERPL-MCNC: 8.7 MG/DL — SIGNIFICANT CHANGE UP (ref 8.4–10.5)
CHLORIDE SERPL-SCNC: 104 MMOL/L — SIGNIFICANT CHANGE UP (ref 96–108)
CO2 SERPL-SCNC: 33 MMOL/L — HIGH (ref 22–31)
CREAT SERPL-MCNC: 0.69 MG/DL — SIGNIFICANT CHANGE UP (ref 0.5–1.3)
GLUCOSE BLDC GLUCOMTR-MCNC: 103 MG/DL — HIGH (ref 70–99)
GLUCOSE BLDC GLUCOMTR-MCNC: 119 MG/DL — HIGH (ref 70–99)
GLUCOSE BLDC GLUCOMTR-MCNC: 123 MG/DL — HIGH (ref 70–99)
GLUCOSE BLDC GLUCOMTR-MCNC: 161 MG/DL — HIGH (ref 70–99)
GLUCOSE BLDC GLUCOMTR-MCNC: 210 MG/DL — HIGH (ref 70–99)
GLUCOSE SERPL-MCNC: 127 MG/DL — HIGH (ref 70–99)
HCT VFR BLD CALC: 31.7 % — LOW (ref 34.5–45)
HGB BLD-MCNC: 10.7 G/DL — LOW (ref 11.5–15.5)
INR BLD: 1.08 RATIO — SIGNIFICANT CHANGE UP (ref 0.88–1.16)
MCHC RBC-ENTMCNC: 27.6 PG — SIGNIFICANT CHANGE UP (ref 27–34)
MCHC RBC-ENTMCNC: 33.8 GM/DL — SIGNIFICANT CHANGE UP (ref 32–36)
MCV RBC AUTO: 81.7 FL — SIGNIFICANT CHANGE UP (ref 80–100)
NRBC # BLD: 0 /100 WBCS — SIGNIFICANT CHANGE UP (ref 0–0)
PLATELET # BLD AUTO: 185 K/UL — SIGNIFICANT CHANGE UP (ref 150–400)
POTASSIUM SERPL-MCNC: 3.4 MMOL/L — LOW (ref 3.5–5.3)
POTASSIUM SERPL-SCNC: 3.4 MMOL/L — LOW (ref 3.5–5.3)
PROTHROM AB SERPL-ACNC: 11.8 SEC — SIGNIFICANT CHANGE UP (ref 10–12.9)
RBC # BLD: 3.88 M/UL — SIGNIFICANT CHANGE UP (ref 3.8–5.2)
RBC # FLD: 13.2 % — SIGNIFICANT CHANGE UP (ref 10.3–14.5)
SODIUM SERPL-SCNC: 143 MMOL/L — SIGNIFICANT CHANGE UP (ref 135–145)
WBC # BLD: 5.66 K/UL — SIGNIFICANT CHANGE UP (ref 3.8–10.5)
WBC # FLD AUTO: 5.66 K/UL — SIGNIFICANT CHANGE UP (ref 3.8–10.5)

## 2019-05-19 PROCEDURE — 99233 SBSQ HOSP IP/OBS HIGH 50: CPT

## 2019-05-19 RX ORDER — POTASSIUM CHLORIDE 20 MEQ
20 PACKET (EA) ORAL ONCE
Refills: 0 | Status: COMPLETED | OUTPATIENT
Start: 2019-05-19 | End: 2019-05-19

## 2019-05-19 RX ORDER — SODIUM CHLORIDE 9 MG/ML
1000 INJECTION, SOLUTION INTRAVENOUS
Refills: 0 | Status: DISCONTINUED | OUTPATIENT
Start: 2019-05-19 | End: 2019-05-20

## 2019-05-19 RX ORDER — INSULIN GLARGINE 100 [IU]/ML
25 INJECTION, SOLUTION SUBCUTANEOUS EVERY MORNING
Refills: 0 | Status: DISCONTINUED | OUTPATIENT
Start: 2019-05-20 | End: 2019-05-20

## 2019-05-19 RX ORDER — TRANEXAMIC ACID 100 MG/ML
1 INJECTION, SOLUTION INTRAVENOUS ONCE
Refills: 0 | Status: DISCONTINUED | OUTPATIENT
Start: 2019-05-20 | End: 2019-05-22

## 2019-05-19 RX ORDER — CHLORHEXIDINE GLUCONATE 213 G/1000ML
1 SOLUTION TOPICAL ONCE
Refills: 0 | Status: DISCONTINUED | OUTPATIENT
Start: 2019-05-20 | End: 2019-05-22

## 2019-05-19 RX ORDER — MINERAL OIL
133 OIL (ML) MISCELLANEOUS ONCE
Refills: 0 | Status: COMPLETED | OUTPATIENT
Start: 2019-05-19 | End: 2019-05-19

## 2019-05-19 RX ADMIN — Medication 1000 MILLIGRAM(S): at 18:18

## 2019-05-19 RX ADMIN — Medication 1000 MILLIGRAM(S): at 12:00

## 2019-05-19 RX ADMIN — Medication 100 MILLIGRAM(S): at 21:35

## 2019-05-19 RX ADMIN — Medication 20 MILLIEQUIVALENT(S): at 12:00

## 2019-05-19 RX ADMIN — CARVEDILOL PHOSPHATE 6.25 MILLIGRAM(S): 80 CAPSULE, EXTENDED RELEASE ORAL at 05:58

## 2019-05-19 RX ADMIN — Medication 1000 MILLIGRAM(S): at 06:27

## 2019-05-19 RX ADMIN — Medication 1000 MILLIGRAM(S): at 12:04

## 2019-05-19 RX ADMIN — Medication 1000 MILLIGRAM(S): at 05:58

## 2019-05-19 RX ADMIN — Medication 0.2 MILLIGRAM(S): at 05:58

## 2019-05-19 RX ADMIN — PANTOPRAZOLE SODIUM 40 MILLIGRAM(S): 20 TABLET, DELAYED RELEASE ORAL at 06:08

## 2019-05-19 RX ADMIN — CARVEDILOL PHOSPHATE 6.25 MILLIGRAM(S): 80 CAPSULE, EXTENDED RELEASE ORAL at 17:11

## 2019-05-19 RX ADMIN — Medication 100 MILLIGRAM(S): at 05:59

## 2019-05-19 RX ADMIN — LOSARTAN POTASSIUM 100 MILLIGRAM(S): 100 TABLET, FILM COATED ORAL at 05:59

## 2019-05-19 RX ADMIN — Medication 0.2 MILLIGRAM(S): at 17:11

## 2019-05-19 RX ADMIN — Medication 2: at 07:57

## 2019-05-19 RX ADMIN — ENOXAPARIN SODIUM 30 MILLIGRAM(S): 100 INJECTION SUBCUTANEOUS at 08:00

## 2019-05-19 RX ADMIN — ATORVASTATIN CALCIUM 10 MILLIGRAM(S): 80 TABLET, FILM COATED ORAL at 21:36

## 2019-05-19 RX ADMIN — INSULIN GLARGINE 50 UNIT(S): 100 INJECTION, SOLUTION SUBCUTANEOUS at 07:57

## 2019-05-19 RX ADMIN — SENNA PLUS 2 TABLET(S): 8.6 TABLET ORAL at 21:35

## 2019-05-19 RX ADMIN — Medication 10 UNIT(S): at 11:59

## 2019-05-19 NOTE — PROGRESS NOTE ADULT - ATTENDING COMMENTS
Pre-op eval: moderate risk for franklin-op complications.  no resting or exertional cardiopulmonary symptoms.   May proceed to OR without further cardiac testing.    May try enema for constipation today.

## 2019-05-20 ENCOUNTER — RESULT REVIEW (OUTPATIENT)
Age: 74
End: 2019-05-20

## 2019-05-20 ENCOUNTER — APPOINTMENT (OUTPATIENT)
Dept: ORTHOPEDIC SURGERY | Facility: HOSPITAL | Age: 74
End: 2019-05-20

## 2019-05-20 LAB
ANION GAP SERPL CALC-SCNC: 8 MMOL/L — SIGNIFICANT CHANGE UP (ref 5–17)
BUN SERPL-MCNC: 15 MG/DL — SIGNIFICANT CHANGE UP (ref 7–23)
CALCIUM SERPL-MCNC: 8.8 MG/DL — SIGNIFICANT CHANGE UP (ref 8.4–10.5)
CHLORIDE SERPL-SCNC: 104 MMOL/L — SIGNIFICANT CHANGE UP (ref 96–108)
CO2 SERPL-SCNC: 32 MMOL/L — HIGH (ref 22–31)
CREAT SERPL-MCNC: 0.89 MG/DL — SIGNIFICANT CHANGE UP (ref 0.5–1.3)
GLUCOSE BLDC GLUCOMTR-MCNC: 144 MG/DL — HIGH (ref 70–99)
GLUCOSE BLDC GLUCOMTR-MCNC: 167 MG/DL — HIGH (ref 70–99)
GLUCOSE BLDC GLUCOMTR-MCNC: 190 MG/DL — HIGH (ref 70–99)
GLUCOSE BLDC GLUCOMTR-MCNC: 271 MG/DL — HIGH (ref 70–99)
GLUCOSE SERPL-MCNC: 233 MG/DL — HIGH (ref 70–99)
HCT VFR BLD CALC: 32.2 % — LOW (ref 34.5–45)
HGB BLD-MCNC: 10.9 G/DL — LOW (ref 11.5–15.5)
POTASSIUM SERPL-MCNC: 4 MMOL/L — SIGNIFICANT CHANGE UP (ref 3.5–5.3)
POTASSIUM SERPL-SCNC: 4 MMOL/L — SIGNIFICANT CHANGE UP (ref 3.5–5.3)
SODIUM SERPL-SCNC: 144 MMOL/L — SIGNIFICANT CHANGE UP (ref 135–145)

## 2019-05-20 PROCEDURE — 27447 TOTAL KNEE ARTHROPLASTY: CPT | Mod: 82,58,RT

## 2019-05-20 PROCEDURE — 99233 SBSQ HOSP IP/OBS HIGH 50: CPT

## 2019-05-20 PROCEDURE — 88311 DECALCIFY TISSUE: CPT | Mod: 26

## 2019-05-20 PROCEDURE — 12345: CPT | Mod: NC

## 2019-05-20 PROCEDURE — 73562 X-RAY EXAM OF KNEE 3: CPT | Mod: 26,RT

## 2019-05-20 PROCEDURE — 88305 TISSUE EXAM BY PATHOLOGIST: CPT | Mod: 26

## 2019-05-20 PROCEDURE — 27447 TOTAL KNEE ARTHROPLASTY: CPT | Mod: 58,RT

## 2019-05-20 RX ORDER — APIXABAN 2.5 MG/1
2.5 TABLET, FILM COATED ORAL
Refills: 0 | Status: COMPLETED | OUTPATIENT
Start: 2019-05-21 | End: 2019-05-21

## 2019-05-20 RX ORDER — DEXTROSE 50 % IN WATER 50 %
25 SYRINGE (ML) INTRAVENOUS ONCE
Refills: 0 | Status: DISCONTINUED | OUTPATIENT
Start: 2019-05-20 | End: 2019-05-22

## 2019-05-20 RX ORDER — VANCOMYCIN HCL 1 G
1250 VIAL (EA) INTRAVENOUS ONCE
Refills: 0 | Status: COMPLETED | OUTPATIENT
Start: 2019-05-20 | End: 2019-05-20

## 2019-05-20 RX ORDER — INSULIN LISPRO 100/ML
VIAL (ML) SUBCUTANEOUS
Refills: 0 | Status: DISCONTINUED | OUTPATIENT
Start: 2019-05-20 | End: 2019-05-20

## 2019-05-20 RX ORDER — OXYCODONE HYDROCHLORIDE 5 MG/1
10 TABLET ORAL
Refills: 0 | Status: DISCONTINUED | OUTPATIENT
Start: 2019-05-20 | End: 2019-05-22

## 2019-05-20 RX ORDER — LOSARTAN POTASSIUM 100 MG/1
100 TABLET, FILM COATED ORAL DAILY
Refills: 0 | Status: DISCONTINUED | OUTPATIENT
Start: 2019-05-22 | End: 2019-05-22

## 2019-05-20 RX ORDER — MAGNESIUM HYDROXIDE 400 MG/1
30 TABLET, CHEWABLE ORAL DAILY
Refills: 0 | Status: DISCONTINUED | OUTPATIENT
Start: 2019-05-20 | End: 2019-05-22

## 2019-05-20 RX ORDER — OXYCODONE HYDROCHLORIDE 5 MG/1
5 TABLET ORAL
Refills: 0 | Status: DISCONTINUED | OUTPATIENT
Start: 2019-05-20 | End: 2019-05-22

## 2019-05-20 RX ORDER — POLYETHYLENE GLYCOL 3350 17 G/17G
17 POWDER, FOR SOLUTION ORAL DAILY
Refills: 0 | Status: DISCONTINUED | OUTPATIENT
Start: 2019-05-20 | End: 2019-05-22

## 2019-05-20 RX ORDER — CELECOXIB 200 MG/1
200 CAPSULE ORAL
Refills: 0 | Status: DISCONTINUED | OUTPATIENT
Start: 2019-05-20 | End: 2019-05-22

## 2019-05-20 RX ORDER — DEXTROSE 50 % IN WATER 50 %
15 SYRINGE (ML) INTRAVENOUS ONCE
Refills: 0 | Status: DISCONTINUED | OUTPATIENT
Start: 2019-05-20 | End: 2019-05-22

## 2019-05-20 RX ORDER — SODIUM CHLORIDE 9 MG/ML
1000 INJECTION, SOLUTION INTRAVENOUS
Refills: 0 | Status: DISCONTINUED | OUTPATIENT
Start: 2019-05-20 | End: 2019-05-22

## 2019-05-20 RX ORDER — ONDANSETRON 8 MG/1
4 TABLET, FILM COATED ORAL ONCE
Refills: 0 | Status: DISCONTINUED | OUTPATIENT
Start: 2019-05-20 | End: 2019-05-20

## 2019-05-20 RX ORDER — ANASTROZOLE 1 MG/1
1 TABLET ORAL DAILY
Refills: 0 | Status: DISCONTINUED | OUTPATIENT
Start: 2019-05-20 | End: 2019-05-22

## 2019-05-20 RX ORDER — SODIUM CHLORIDE 9 MG/ML
1000 INJECTION, SOLUTION INTRAVENOUS
Refills: 0 | Status: DISCONTINUED | OUTPATIENT
Start: 2019-05-20 | End: 2019-05-21

## 2019-05-20 RX ORDER — INSULIN LISPRO 100/ML
VIAL (ML) SUBCUTANEOUS AT BEDTIME
Refills: 0 | Status: DISCONTINUED | OUTPATIENT
Start: 2019-05-20 | End: 2019-05-22

## 2019-05-20 RX ORDER — SENNA PLUS 8.6 MG/1
2 TABLET ORAL AT BEDTIME
Refills: 0 | Status: DISCONTINUED | OUTPATIENT
Start: 2019-05-20 | End: 2019-05-22

## 2019-05-20 RX ORDER — ACETAMINOPHEN 500 MG
1000 TABLET ORAL EVERY 8 HOURS
Refills: 0 | Status: DISCONTINUED | OUTPATIENT
Start: 2019-05-21 | End: 2019-05-22

## 2019-05-20 RX ORDER — HYDROMORPHONE HYDROCHLORIDE 2 MG/ML
0.5 INJECTION INTRAMUSCULAR; INTRAVENOUS; SUBCUTANEOUS
Refills: 0 | Status: DISCONTINUED | OUTPATIENT
Start: 2019-05-20 | End: 2019-05-22

## 2019-05-20 RX ORDER — SODIUM CHLORIDE 9 MG/ML
1000 INJECTION, SOLUTION INTRAVENOUS
Refills: 0 | Status: DISCONTINUED | OUTPATIENT
Start: 2019-05-20 | End: 2019-05-20

## 2019-05-20 RX ORDER — ONDANSETRON 8 MG/1
4 TABLET, FILM COATED ORAL EVERY 6 HOURS
Refills: 0 | Status: DISCONTINUED | OUTPATIENT
Start: 2019-05-20 | End: 2019-05-22

## 2019-05-20 RX ORDER — DOCUSATE SODIUM 100 MG
100 CAPSULE ORAL THREE TIMES A DAY
Refills: 0 | Status: DISCONTINUED | OUTPATIENT
Start: 2019-05-20 | End: 2019-05-22

## 2019-05-20 RX ORDER — INSULIN LISPRO 100/ML
6 VIAL (ML) SUBCUTANEOUS ONCE
Refills: 0 | Status: COMPLETED | OUTPATIENT
Start: 2019-05-20 | End: 2019-05-20

## 2019-05-20 RX ORDER — PANTOPRAZOLE SODIUM 20 MG/1
40 TABLET, DELAYED RELEASE ORAL
Refills: 0 | Status: DISCONTINUED | OUTPATIENT
Start: 2019-05-20 | End: 2019-05-22

## 2019-05-20 RX ORDER — CARVEDILOL PHOSPHATE 80 MG/1
6.25 CAPSULE, EXTENDED RELEASE ORAL ONCE
Refills: 0 | Status: COMPLETED | OUTPATIENT
Start: 2019-05-20 | End: 2019-05-20

## 2019-05-20 RX ORDER — APIXABAN 2.5 MG/1
2.5 TABLET, FILM COATED ORAL EVERY 12 HOURS
Refills: 0 | Status: DISCONTINUED | OUTPATIENT
Start: 2019-05-22 | End: 2019-05-22

## 2019-05-20 RX ORDER — INSULIN LISPRO 100/ML
6 VIAL (ML) SUBCUTANEOUS
Refills: 0 | Status: DISCONTINUED | OUTPATIENT
Start: 2019-05-20 | End: 2019-05-21

## 2019-05-20 RX ORDER — ACETAMINOPHEN 500 MG
1000 TABLET ORAL EVERY 6 HOURS
Refills: 0 | Status: COMPLETED | OUTPATIENT
Start: 2019-05-20 | End: 2019-05-21

## 2019-05-20 RX ORDER — DEXTROSE 50 % IN WATER 50 %
12.5 SYRINGE (ML) INTRAVENOUS ONCE
Refills: 0 | Status: DISCONTINUED | OUTPATIENT
Start: 2019-05-20 | End: 2019-05-22

## 2019-05-20 RX ORDER — APREPITANT 80 MG/1
40 CAPSULE ORAL ONCE
Refills: 0 | Status: COMPLETED | OUTPATIENT
Start: 2019-05-20 | End: 2019-05-20

## 2019-05-20 RX ORDER — INSULIN GLARGINE 100 [IU]/ML
50 INJECTION, SOLUTION SUBCUTANEOUS EVERY MORNING
Refills: 0 | Status: DISCONTINUED | OUTPATIENT
Start: 2019-05-21 | End: 2019-05-22

## 2019-05-20 RX ORDER — HYDROMORPHONE HYDROCHLORIDE 2 MG/ML
0.5 INJECTION INTRAMUSCULAR; INTRAVENOUS; SUBCUTANEOUS
Refills: 0 | Status: DISCONTINUED | OUTPATIENT
Start: 2019-05-20 | End: 2019-05-20

## 2019-05-20 RX ORDER — ATORVASTATIN CALCIUM 80 MG/1
10 TABLET, FILM COATED ORAL AT BEDTIME
Refills: 0 | Status: DISCONTINUED | OUTPATIENT
Start: 2019-05-20 | End: 2019-05-22

## 2019-05-20 RX ORDER — VANCOMYCIN HCL 1 G
1000 VIAL (EA) INTRAVENOUS ONCE
Refills: 0 | Status: DISCONTINUED | OUTPATIENT
Start: 2019-05-20 | End: 2019-05-20

## 2019-05-20 RX ORDER — CARVEDILOL PHOSPHATE 80 MG/1
6.25 CAPSULE, EXTENDED RELEASE ORAL EVERY 12 HOURS
Refills: 0 | Status: DISCONTINUED | OUTPATIENT
Start: 2019-05-20 | End: 2019-05-22

## 2019-05-20 RX ORDER — INSULIN LISPRO 100/ML
VIAL (ML) SUBCUTANEOUS
Refills: 0 | Status: DISCONTINUED | OUTPATIENT
Start: 2019-05-20 | End: 2019-05-22

## 2019-05-20 RX ORDER — GLUCAGON INJECTION, SOLUTION 0.5 MG/.1ML
1 INJECTION, SOLUTION SUBCUTANEOUS ONCE
Refills: 0 | Status: DISCONTINUED | OUTPATIENT
Start: 2019-05-20 | End: 2019-05-22

## 2019-05-20 RX ADMIN — Medication 166.67 MILLIGRAM(S): at 21:52

## 2019-05-20 RX ADMIN — INSULIN GLARGINE 25 UNIT(S): 100 INJECTION, SOLUTION SUBCUTANEOUS at 09:05

## 2019-05-20 RX ADMIN — CARVEDILOL PHOSPHATE 6.25 MILLIGRAM(S): 80 CAPSULE, EXTENDED RELEASE ORAL at 17:15

## 2019-05-20 RX ADMIN — CARVEDILOL PHOSPHATE 6.25 MILLIGRAM(S): 80 CAPSULE, EXTENDED RELEASE ORAL at 06:01

## 2019-05-20 RX ADMIN — Medication 6 UNIT(S): at 23:31

## 2019-05-20 RX ADMIN — Medication 1000 MILLIGRAM(S): at 23:41

## 2019-05-20 RX ADMIN — HYDROMORPHONE HYDROCHLORIDE 0.5 MILLIGRAM(S): 2 INJECTION INTRAMUSCULAR; INTRAVENOUS; SUBCUTANEOUS at 21:25

## 2019-05-20 RX ADMIN — Medication 400 MILLIGRAM(S): at 23:40

## 2019-05-20 RX ADMIN — HYDROMORPHONE HYDROCHLORIDE 0.5 MILLIGRAM(S): 2 INJECTION INTRAMUSCULAR; INTRAVENOUS; SUBCUTANEOUS at 21:55

## 2019-05-20 RX ADMIN — Medication 1000 MILLIGRAM(S): at 06:22

## 2019-05-20 RX ADMIN — CELECOXIB 200 MILLIGRAM(S): 200 CAPSULE ORAL at 21:29

## 2019-05-20 RX ADMIN — SODIUM CHLORIDE 100 MILLILITER(S): 9 INJECTION, SOLUTION INTRAVENOUS at 21:23

## 2019-05-20 RX ADMIN — SODIUM CHLORIDE 75 MILLILITER(S): 9 INJECTION, SOLUTION INTRAVENOUS at 14:10

## 2019-05-20 RX ADMIN — Medication 1000 MILLIGRAM(S): at 06:00

## 2019-05-20 RX ADMIN — ATORVASTATIN CALCIUM 10 MILLIGRAM(S): 80 TABLET, FILM COATED ORAL at 21:53

## 2019-05-20 RX ADMIN — Medication 400 MILLIGRAM(S): at 18:02

## 2019-05-20 RX ADMIN — Medication 166.67 MILLIGRAM(S): at 09:06

## 2019-05-20 RX ADMIN — Medication 1000 MILLIGRAM(S): at 18:06

## 2019-05-20 RX ADMIN — APREPITANT 40 MILLIGRAM(S): 80 CAPSULE ORAL at 10:36

## 2019-05-20 RX ADMIN — PANTOPRAZOLE SODIUM 40 MILLIGRAM(S): 20 TABLET, DELAYED RELEASE ORAL at 06:01

## 2019-05-20 RX ADMIN — Medication 6 UNIT(S): at 17:16

## 2019-05-20 RX ADMIN — CELECOXIB 200 MILLIGRAM(S): 200 CAPSULE ORAL at 21:23

## 2019-05-20 RX ADMIN — SODIUM CHLORIDE 75 MILLILITER(S): 9 INJECTION, SOLUTION INTRAVENOUS at 00:44

## 2019-05-20 RX ADMIN — SODIUM CHLORIDE 75 MILLILITER(S): 9 INJECTION, SOLUTION INTRAVENOUS at 09:50

## 2019-05-20 RX ADMIN — Medication 100 MILLIGRAM(S): at 21:23

## 2019-05-20 RX ADMIN — Medication 0.2 MILLIGRAM(S): at 06:01

## 2019-05-20 RX ADMIN — CARVEDILOL PHOSPHATE 6.25 MILLIGRAM(S): 80 CAPSULE, EXTENDED RELEASE ORAL at 21:22

## 2019-05-20 RX ADMIN — Medication 2: at 17:15

## 2019-05-20 NOTE — BRIEF OPERATIVE NOTE - NSICDXBRIEFPROCEDURE_GEN_ALL_CORE_FT
PROCEDURES:  Total left knee replacement 15-May-2019 12:25:11  Phuc Grant
PROCEDURES:  Total knee replacement 20-May-2019 13:35:19  Keyonna Betts

## 2019-05-20 NOTE — PHYSICAL THERAPY INITIAL EVALUATION ADULT - GAIT TRAINING, PT EVAL
3-5 sessions: pt will amb 75 ft with supAx1 and rolling walker
Pt will be able to ambulate 150 feet with min assist of 1 with RW in 2-3 days

## 2019-05-20 NOTE — PHYSICAL THERAPY INITIAL EVALUATION ADULT - PLANNED THERAPY INTERVENTIONS, PT EVAL
transfer training/bed mobility training/gait training
bed mobility training/gait training/balance training

## 2019-05-20 NOTE — PHYSICAL THERAPY INITIAL EVALUATION ADULT - GAIT DEVIATIONS NOTED, PT EVAL
decreased reji/decreased step length
decreased step length/decreased stride length/decreased weight-shifting ability/increased time in double stance/decreased reji

## 2019-05-20 NOTE — PHYSICAL THERAPY INITIAL EVALUATION ADULT - RANGE OF MOTION EXAMINATION, REHAB EVAL
bilateral upper extremity ROM was WNL (within normal limits)/bilateral lower extremity was ROM was WNL (within normal limits)
bilateral upper extremity ROM was WNL (within normal limits)/bilateral lower extremity ROM was WFL (within functional limits)

## 2019-05-20 NOTE — PHYSICAL THERAPY INITIAL EVALUATION ADULT - ADDITIONAL COMMENTS
Pt lives in a house with  with 3 RILEY, -HRs, no steps inside. Pt reports using a cane occasionally to ambulate.
Pt lives in a house with  with 3 RILEY, -HRs, no steps inside. Pt reports using a cane occasionally to ambulate.

## 2019-05-20 NOTE — BRIEF OPERATIVE NOTE - NSICDXBRIEFPOSTOP_GEN_ALL_CORE_FT
POST-OP DIAGNOSIS:  Osteoarthritis of left knee 15-May-2019 12:26:16  Phuc Grant
POST-OP DIAGNOSIS:  Right knee DJD 20-May-2019 13:36:10  Keyonna Betts

## 2019-05-20 NOTE — BRIEF OPERATIVE NOTE - NSICDXBRIEFPREOP_GEN_ALL_CORE_FT
PRE-OP DIAGNOSIS:  Osteoarthritis of left knee 15-May-2019 12:25:46  Phuc Grant
PRE-OP DIAGNOSIS:  Right knee DJD 20-May-2019 13:35:51  Keyonna Betts

## 2019-05-21 LAB
ANION GAP SERPL CALC-SCNC: 8 MMOL/L — SIGNIFICANT CHANGE UP (ref 5–17)
BUN SERPL-MCNC: 14 MG/DL — SIGNIFICANT CHANGE UP (ref 7–23)
CALCIUM SERPL-MCNC: 8.8 MG/DL — SIGNIFICANT CHANGE UP (ref 8.4–10.5)
CHLORIDE SERPL-SCNC: 102 MMOL/L — SIGNIFICANT CHANGE UP (ref 96–108)
CO2 SERPL-SCNC: 33 MMOL/L — HIGH (ref 22–31)
CREAT SERPL-MCNC: 0.86 MG/DL — SIGNIFICANT CHANGE UP (ref 0.5–1.3)
GLUCOSE BLDC GLUCOMTR-MCNC: 111 MG/DL — HIGH (ref 70–99)
GLUCOSE BLDC GLUCOMTR-MCNC: 160 MG/DL — HIGH (ref 70–99)
GLUCOSE BLDC GLUCOMTR-MCNC: 200 MG/DL — HIGH (ref 70–99)
GLUCOSE BLDC GLUCOMTR-MCNC: 217 MG/DL — HIGH (ref 70–99)
GLUCOSE BLDC GLUCOMTR-MCNC: 219 MG/DL — HIGH (ref 70–99)
GLUCOSE BLDC GLUCOMTR-MCNC: 244 MG/DL — HIGH (ref 70–99)
GLUCOSE BLDC GLUCOMTR-MCNC: 276 MG/DL — HIGH (ref 70–99)
GLUCOSE SERPL-MCNC: 181 MG/DL — HIGH (ref 70–99)
HCT VFR BLD CALC: 29.6 % — LOW (ref 34.5–45)
HGB BLD-MCNC: 10.1 G/DL — LOW (ref 11.5–15.5)
MCHC RBC-ENTMCNC: 27.2 PG — SIGNIFICANT CHANGE UP (ref 27–34)
MCHC RBC-ENTMCNC: 34.1 GM/DL — SIGNIFICANT CHANGE UP (ref 32–36)
MCV RBC AUTO: 79.6 FL — LOW (ref 80–100)
NRBC # BLD: 0 /100 WBCS — SIGNIFICANT CHANGE UP (ref 0–0)
PLATELET # BLD AUTO: 232 K/UL — SIGNIFICANT CHANGE UP (ref 150–400)
POTASSIUM SERPL-MCNC: 4 MMOL/L — SIGNIFICANT CHANGE UP (ref 3.5–5.3)
POTASSIUM SERPL-SCNC: 4 MMOL/L — SIGNIFICANT CHANGE UP (ref 3.5–5.3)
RBC # BLD: 3.72 M/UL — LOW (ref 3.8–5.2)
RBC # FLD: 12.8 % — SIGNIFICANT CHANGE UP (ref 10.3–14.5)
SODIUM SERPL-SCNC: 143 MMOL/L — SIGNIFICANT CHANGE UP (ref 135–145)
WBC # BLD: 10.47 K/UL — SIGNIFICANT CHANGE UP (ref 3.8–10.5)
WBC # FLD AUTO: 10.47 K/UL — SIGNIFICANT CHANGE UP (ref 3.8–10.5)

## 2019-05-21 PROCEDURE — 12345: CPT | Mod: NC

## 2019-05-21 PROCEDURE — 99233 SBSQ HOSP IP/OBS HIGH 50: CPT

## 2019-05-21 RX ORDER — INSULIN LISPRO 100/ML
6 VIAL (ML) SUBCUTANEOUS ONCE
Refills: 0 | Status: COMPLETED | OUTPATIENT
Start: 2019-05-21 | End: 2019-05-21

## 2019-05-21 RX ORDER — AMLODIPINE BESYLATE 2.5 MG/1
5 TABLET ORAL ONCE
Refills: 0 | Status: COMPLETED | OUTPATIENT
Start: 2019-05-21 | End: 2019-05-21

## 2019-05-21 RX ORDER — INSULIN LISPRO 100/ML
10 VIAL (ML) SUBCUTANEOUS
Refills: 0 | Status: DISCONTINUED | OUTPATIENT
Start: 2019-05-21 | End: 2019-05-22

## 2019-05-21 RX ADMIN — Medication 100 MILLIGRAM(S): at 05:13

## 2019-05-21 RX ADMIN — Medication 1000 MILLIGRAM(S): at 12:22

## 2019-05-21 RX ADMIN — PANTOPRAZOLE SODIUM 40 MILLIGRAM(S): 20 TABLET, DELAYED RELEASE ORAL at 05:13

## 2019-05-21 RX ADMIN — Medication 10 UNIT(S): at 12:23

## 2019-05-21 RX ADMIN — CELECOXIB 200 MILLIGRAM(S): 200 CAPSULE ORAL at 22:35

## 2019-05-21 RX ADMIN — ATORVASTATIN CALCIUM 10 MILLIGRAM(S): 80 TABLET, FILM COATED ORAL at 22:36

## 2019-05-21 RX ADMIN — INSULIN GLARGINE 50 UNIT(S): 100 INJECTION, SOLUTION SUBCUTANEOUS at 08:14

## 2019-05-21 RX ADMIN — OXYCODONE HYDROCHLORIDE 10 MILLIGRAM(S): 5 TABLET ORAL at 19:56

## 2019-05-21 RX ADMIN — CARVEDILOL PHOSPHATE 6.25 MILLIGRAM(S): 80 CAPSULE, EXTENDED RELEASE ORAL at 16:54

## 2019-05-21 RX ADMIN — SENNA PLUS 2 TABLET(S): 8.6 TABLET ORAL at 22:36

## 2019-05-21 RX ADMIN — Medication 1000 MILLIGRAM(S): at 19:58

## 2019-05-21 RX ADMIN — CELECOXIB 200 MILLIGRAM(S): 200 CAPSULE ORAL at 08:16

## 2019-05-21 RX ADMIN — Medication 2: at 08:15

## 2019-05-21 RX ADMIN — Medication 0.2 MILLIGRAM(S): at 16:54

## 2019-05-21 RX ADMIN — Medication 1000 MILLIGRAM(S): at 12:23

## 2019-05-21 RX ADMIN — Medication 1000 MILLIGRAM(S): at 19:59

## 2019-05-21 RX ADMIN — Medication 6 UNIT(S): at 08:15

## 2019-05-21 RX ADMIN — Medication 100 MILLIGRAM(S): at 22:35

## 2019-05-21 RX ADMIN — Medication 4: at 12:22

## 2019-05-21 RX ADMIN — Medication 10 UNIT(S): at 16:49

## 2019-05-21 RX ADMIN — Medication 100 MILLIGRAM(S): at 12:22

## 2019-05-21 RX ADMIN — Medication 10 MILLIGRAM(S): at 17:16

## 2019-05-21 RX ADMIN — APIXABAN 2.5 MILLIGRAM(S): 2.5 TABLET, FILM COATED ORAL at 22:36

## 2019-05-21 RX ADMIN — OXYCODONE HYDROCHLORIDE 10 MILLIGRAM(S): 5 TABLET ORAL at 20:53

## 2019-05-21 RX ADMIN — CARVEDILOL PHOSPHATE 6.25 MILLIGRAM(S): 80 CAPSULE, EXTENDED RELEASE ORAL at 05:13

## 2019-05-21 RX ADMIN — Medication 0.2 MILLIGRAM(S): at 05:13

## 2019-05-21 RX ADMIN — Medication 400 MILLIGRAM(S): at 05:14

## 2019-05-21 RX ADMIN — CELECOXIB 200 MILLIGRAM(S): 200 CAPSULE ORAL at 22:38

## 2019-05-21 RX ADMIN — Medication 6 UNIT(S): at 01:38

## 2019-05-21 RX ADMIN — Medication 1000 MILLIGRAM(S): at 05:17

## 2019-05-21 RX ADMIN — APIXABAN 2.5 MILLIGRAM(S): 2.5 TABLET, FILM COATED ORAL at 12:22

## 2019-05-21 RX ADMIN — Medication 2: at 16:55

## 2019-05-21 RX ADMIN — AMLODIPINE BESYLATE 5 MILLIGRAM(S): 2.5 TABLET ORAL at 03:36

## 2019-05-21 NOTE — OCCUPATIONAL THERAPY INITIAL EVALUATION ADULT - TRANSFER TRAINING, PT EVAL
Patient will transfer to toilet with DME as needed with CG  with in 3-5 sessions. Patient will transfer to toilet with DME as needed Independently   with in 3-5 sessions.

## 2019-05-21 NOTE — ANESTHESIA FOLLOW-UP NOTE - NSEVALATION_GEN_ALL_CORE
All questions were answered/No apparent complications or complaints regarding anesthesia care at this time
All questions were answered/No apparent complications or complaints regarding anesthesia care at this time

## 2019-05-21 NOTE — OCCUPATIONAL THERAPY INITIAL EVALUATION ADULT - ADL RETRAINING, OT EVAL
Patient will dress lower body with minimal assistance, AE as needed with in 3-5 sessions. Patient will dress lower body with set up, AE as needed with in 3-5 sessions.

## 2019-05-21 NOTE — DIETITIAN INITIAL EVALUATION ADULT. - OTHER INFO
74 year old female s/p BL TKR, seen for nutrition assessment secondary to LOS policy.  Pt on Con CHO diet throughout week, reports good appetite and intake.  Meal changes obtained daily to optimize po intake.  Pt c hx T2DM, A1c 7.0%; denies interest in diet education/nutrition related questions at time of visit.

## 2019-05-21 NOTE — OCCUPATIONAL THERAPY INITIAL EVALUATION ADULT - DIAGNOSIS, OT EVAL
Pt with decreased adl status and functional mobility.
Pt with decreased adl status and functional mobility.

## 2019-05-21 NOTE — OCCUPATIONAL THERAPY INITIAL EVALUATION ADULT - IMPAIRED TRANSFERS: TOILET, REHAB EVAL
Pt impulsive with max vc's to wait for assist, RW etc. Jaylene Collier RN notified./decreased strength

## 2019-05-21 NOTE — OCCUPATIONAL THERAPY INITIAL EVALUATION ADULT - NS ASR FOLLOW COMMAND OT EVAL
100% of the time/Patient educated regarding fall prevention and home/hospital safety. Pt educated on use of AE/DME recommended for safety & the need to call for assistance. Patient with good understanding. Role of OT and patient goals discussed.
100% of the time/Patient educated regarding fall prevention and home/hospital safety. Pt educated on use of AE/DME recommended for safety & the need to call for assistance. Patient with good understanding. Role of OT and patient goals discussed.

## 2019-05-21 NOTE — OCCUPATIONAL THERAPY INITIAL EVALUATION ADULT - ADDITIONAL COMMENTS
Lives with spouse . 1 step to enter no handrail. No steps inside. Stall shower. Has a cane, RW and commode.
Lives with spouse . 1 step to enter no handrail. No steps inside. Stall shower. Has a cane, RW and commode.

## 2019-05-21 NOTE — OCCUPATIONAL THERAPY INITIAL EVALUATION ADULT - IADL RETRAINING, OT EVAL
Patient will increase standing tolerance to 3-5 minutes for grooming at the sink with in 3-5 sessions.
Patient will increase standing tolerance to 3-5 minutes for grooming at the sink with in 3-5 sessions.

## 2019-05-21 NOTE — OCCUPATIONAL THERAPY INITIAL EVALUATION ADULT - ANTICIPATED DISCHARGE DISPOSITION, OT EVAL
rehabilitation facility OT recommending home however pt requesting rehab stating elderly spouse at home & "high bed"

## 2019-05-22 ENCOUNTER — TRANSCRIPTION ENCOUNTER (OUTPATIENT)
Age: 74
End: 2019-05-22

## 2019-05-22 VITALS
TEMPERATURE: 98 F | OXYGEN SATURATION: 93 % | HEART RATE: 68 BPM | SYSTOLIC BLOOD PRESSURE: 118 MMHG | DIASTOLIC BLOOD PRESSURE: 74 MMHG | RESPIRATION RATE: 18 BRPM

## 2019-05-22 PROBLEM — Z85.3 PERSONAL HISTORY OF MALIGNANT NEOPLASM OF BREAST: Chronic | Status: ACTIVE | Noted: 2019-05-01

## 2019-05-22 LAB
ANION GAP SERPL CALC-SCNC: 5 MMOL/L — SIGNIFICANT CHANGE UP (ref 5–17)
BUN SERPL-MCNC: 22 MG/DL — SIGNIFICANT CHANGE UP (ref 7–23)
CALCIUM SERPL-MCNC: 8.3 MG/DL — LOW (ref 8.4–10.5)
CHLORIDE SERPL-SCNC: 100 MMOL/L — SIGNIFICANT CHANGE UP (ref 96–108)
CO2 SERPL-SCNC: 33 MMOL/L — HIGH (ref 22–31)
CREAT SERPL-MCNC: 1.05 MG/DL — SIGNIFICANT CHANGE UP (ref 0.5–1.3)
GLUCOSE BLDC GLUCOMTR-MCNC: 126 MG/DL — HIGH (ref 70–99)
GLUCOSE BLDC GLUCOMTR-MCNC: 175 MG/DL — HIGH (ref 70–99)
GLUCOSE BLDC GLUCOMTR-MCNC: 176 MG/DL — HIGH (ref 70–99)
GLUCOSE SERPL-MCNC: 162 MG/DL — HIGH (ref 70–99)
HCT VFR BLD CALC: 26.8 % — LOW (ref 34.5–45)
HGB BLD-MCNC: 8.9 G/DL — LOW (ref 11.5–15.5)
MCHC RBC-ENTMCNC: 27.4 PG — SIGNIFICANT CHANGE UP (ref 27–34)
MCHC RBC-ENTMCNC: 33.2 GM/DL — SIGNIFICANT CHANGE UP (ref 32–36)
MCV RBC AUTO: 82.5 FL — SIGNIFICANT CHANGE UP (ref 80–100)
NRBC # BLD: 0 /100 WBCS — SIGNIFICANT CHANGE UP (ref 0–0)
PLATELET # BLD AUTO: 180 K/UL — SIGNIFICANT CHANGE UP (ref 150–400)
POTASSIUM SERPL-MCNC: 3.8 MMOL/L — SIGNIFICANT CHANGE UP (ref 3.5–5.3)
POTASSIUM SERPL-SCNC: 3.8 MMOL/L — SIGNIFICANT CHANGE UP (ref 3.5–5.3)
RBC # BLD: 3.25 M/UL — LOW (ref 3.8–5.2)
RBC # FLD: 13.3 % — SIGNIFICANT CHANGE UP (ref 10.3–14.5)
SODIUM SERPL-SCNC: 138 MMOL/L — SIGNIFICANT CHANGE UP (ref 135–145)
WBC # BLD: 8.25 K/UL — SIGNIFICANT CHANGE UP (ref 3.8–10.5)
WBC # FLD AUTO: 8.25 K/UL — SIGNIFICANT CHANGE UP (ref 3.8–10.5)

## 2019-05-22 PROCEDURE — 86900 BLOOD TYPING SEROLOGIC ABO: CPT

## 2019-05-22 PROCEDURE — 97165 OT EVAL LOW COMPLEX 30 MIN: CPT

## 2019-05-22 PROCEDURE — 97116 GAIT TRAINING THERAPY: CPT

## 2019-05-22 PROCEDURE — 80048 BASIC METABOLIC PNL TOTAL CA: CPT

## 2019-05-22 PROCEDURE — 88305 TISSUE EXAM BY PATHOLOGIST: CPT

## 2019-05-22 PROCEDURE — C1889: CPT

## 2019-05-22 PROCEDURE — 82962 GLUCOSE BLOOD TEST: CPT

## 2019-05-22 PROCEDURE — 99239 HOSP IP/OBS DSCHRG MGMT >30: CPT

## 2019-05-22 PROCEDURE — 36415 COLL VENOUS BLD VENIPUNCTURE: CPT

## 2019-05-22 PROCEDURE — 93970 EXTREMITY STUDY: CPT

## 2019-05-22 PROCEDURE — C1713: CPT

## 2019-05-22 PROCEDURE — 85610 PROTHROMBIN TIME: CPT

## 2019-05-22 PROCEDURE — 97168 OT RE-EVAL EST PLAN CARE: CPT

## 2019-05-22 PROCEDURE — 86901 BLOOD TYPING SEROLOGIC RH(D): CPT

## 2019-05-22 PROCEDURE — 94664 DEMO&/EVAL PT USE INHALER: CPT

## 2019-05-22 PROCEDURE — 97530 THERAPEUTIC ACTIVITIES: CPT

## 2019-05-22 PROCEDURE — 85027 COMPLETE CBC AUTOMATED: CPT

## 2019-05-22 PROCEDURE — 97535 SELF CARE MNGMENT TRAINING: CPT

## 2019-05-22 PROCEDURE — 97161 PT EVAL LOW COMPLEX 20 MIN: CPT

## 2019-05-22 PROCEDURE — 85014 HEMATOCRIT: CPT

## 2019-05-22 PROCEDURE — 97110 THERAPEUTIC EXERCISES: CPT

## 2019-05-22 PROCEDURE — 86850 RBC ANTIBODY SCREEN: CPT

## 2019-05-22 PROCEDURE — 85730 THROMBOPLASTIN TIME PARTIAL: CPT

## 2019-05-22 PROCEDURE — 73562 X-RAY EXAM OF KNEE 3: CPT

## 2019-05-22 PROCEDURE — 85018 HEMOGLOBIN: CPT

## 2019-05-22 PROCEDURE — C1776: CPT

## 2019-05-22 PROCEDURE — 88311 DECALCIFY TISSUE: CPT

## 2019-05-22 RX ORDER — ACETAMINOPHEN 500 MG
2 TABLET ORAL
Qty: 0 | Refills: 0 | DISCHARGE
Start: 2019-05-22

## 2019-05-22 RX ORDER — INSULIN DEGLUDEC 100 U/ML
50 INJECTION, SOLUTION SUBCUTANEOUS
Qty: 0 | Refills: 0 | DISCHARGE

## 2019-05-22 RX ORDER — OXYCODONE HYDROCHLORIDE 5 MG/1
1 TABLET ORAL
Qty: 0 | Refills: 0 | DISCHARGE
Start: 2019-05-22

## 2019-05-22 RX ORDER — APIXABAN 2.5 MG/1
1 TABLET, FILM COATED ORAL
Qty: 0 | Refills: 0 | DISCHARGE
Start: 2019-05-22

## 2019-05-22 RX ORDER — PANTOPRAZOLE SODIUM 20 MG/1
1 TABLET, DELAYED RELEASE ORAL
Qty: 0 | Refills: 0 | DISCHARGE
Start: 2019-05-22

## 2019-05-22 RX ORDER — DOCUSATE SODIUM 100 MG
1 CAPSULE ORAL
Qty: 0 | Refills: 0 | DISCHARGE
Start: 2019-05-22

## 2019-05-22 RX ORDER — CELECOXIB 200 MG/1
1 CAPSULE ORAL
Qty: 0 | Refills: 0 | DISCHARGE
Start: 2019-05-22

## 2019-05-22 RX ORDER — INSULIN LISPRO 100/ML
10 VIAL (ML) SUBCUTANEOUS
Qty: 0 | Refills: 0 | DISCHARGE

## 2019-05-22 RX ORDER — SENNA PLUS 8.6 MG/1
2 TABLET ORAL
Qty: 0 | Refills: 0 | DISCHARGE
Start: 2019-05-22

## 2019-05-22 RX ADMIN — Medication 2: at 08:05

## 2019-05-22 RX ADMIN — Medication 1000 MILLIGRAM(S): at 11:14

## 2019-05-22 RX ADMIN — Medication 100 MILLIGRAM(S): at 05:24

## 2019-05-22 RX ADMIN — APIXABAN 2.5 MILLIGRAM(S): 2.5 TABLET, FILM COATED ORAL at 10:58

## 2019-05-22 RX ADMIN — CELECOXIB 200 MILLIGRAM(S): 200 CAPSULE ORAL at 12:13

## 2019-05-22 RX ADMIN — OXYCODONE HYDROCHLORIDE 10 MILLIGRAM(S): 5 TABLET ORAL at 05:37

## 2019-05-22 RX ADMIN — INSULIN GLARGINE 50 UNIT(S): 100 INJECTION, SOLUTION SUBCUTANEOUS at 08:06

## 2019-05-22 RX ADMIN — Medication 1000 MILLIGRAM(S): at 05:24

## 2019-05-22 RX ADMIN — CELECOXIB 200 MILLIGRAM(S): 200 CAPSULE ORAL at 08:07

## 2019-05-22 RX ADMIN — Medication 1000 MILLIGRAM(S): at 05:25

## 2019-05-22 RX ADMIN — PANTOPRAZOLE SODIUM 40 MILLIGRAM(S): 20 TABLET, DELAYED RELEASE ORAL at 05:26

## 2019-05-22 RX ADMIN — CARVEDILOL PHOSPHATE 6.25 MILLIGRAM(S): 80 CAPSULE, EXTENDED RELEASE ORAL at 05:24

## 2019-05-22 RX ADMIN — LOSARTAN POTASSIUM 100 MILLIGRAM(S): 100 TABLET, FILM COATED ORAL at 05:24

## 2019-05-22 RX ADMIN — OXYCODONE HYDROCHLORIDE 10 MILLIGRAM(S): 5 TABLET ORAL at 06:30

## 2019-05-22 RX ADMIN — Medication 10 UNIT(S): at 08:06

## 2019-05-22 RX ADMIN — ONDANSETRON 4 MILLIGRAM(S): 8 TABLET, FILM COATED ORAL at 06:12

## 2019-05-22 RX ADMIN — Medication 0.2 MILLIGRAM(S): at 05:24

## 2019-05-22 NOTE — DISCHARGE NOTE PROVIDER - HOSPITAL COURSE
This patient was admitted to [facility] with a history of severe degenerative joint disease of the Bilateral knees.  Patient went to Pre-Surgical Testing at [facility] and was medically cleared to undergo elective procedure. Left TKR performed on 5/15/19 by Dr. Hidalgo.  No operative or franklin-operative complications arose post operation. Patient received antibiotic according to SCIP guidelines for infection prevention.  Lovenox was given for DVT prophylaxis.  Anesthesia, Medical Hospitalist, Physical Therapy and Occupational Therapy were consulted.     Bilateral lower extremity dopplers performed which were negative, medical clearance obtained and patient underwent staged Right TKR on 5/20/19 by Dr. Hidalgo.  No operative or franklin-operative complications arose during patients hospital course.  Patient received antibiotic according to SCIP guidelines for infection prevention.  Eliquis was given for DVT prophylaxis. Patient is stable for discharge with a good prognosis.  Appropriate discharge instructions and medications are provided in this document.

## 2019-05-22 NOTE — DISCHARGE NOTE PROVIDER - NSDCCPCAREPLAN_GEN_ALL_CORE_FT
PRINCIPAL DISCHARGE DIAGNOSIS  Diagnosis: Osteoarthritis of knees, bilateral  Assessment and Plan of Treatment: Physical Therapy/Occupational Therapy for ambulation, transfers, stairs, ADL's, Range of Motion Exercises, Isometrics.  Full weight bearing as tolerated with rolling walker  Range of Motion Goals: Flexion 120 degrees; Extension 0 degrees  Keep incision clean and dry.  Suture/prineo dressing removal 14 days after surgery at rehab facility or Surgeon's office  May shower post-op day #5 if no drainage from incision        SECONDARY DISCHARGE DIAGNOSES  Diagnosis: Dyslipidemia  Assessment and Plan of Treatment: Dyslipidemia

## 2019-05-22 NOTE — PROGRESS NOTE ADULT - SUBJECTIVE AND OBJECTIVE BOX
Admission medication reconciliation/Presurgical evaluation:  No Known Drug Allergies   Intolerance:  shellfish: Vomiting    Home Medications:   · 	atorvastatin 10 mg once a day  · 	Insulin degludec (Tresiba 200 units/mL) 50 units SC once daily  · 	HumaLOG 10 units PRN mealtime coverage  · 	losartan-hydroCHLOROthiazide 100 mg-25 mg once a day  · 	anastrozole 1 mg once a day  · 	cloNIDine 0.2 mg 2 times a day  · 	carvedilol 6.25 mg 2 times a day    PAST MEDICAL HISTORY:  Atrial Arrhythmia  Anal bleeding   Diabetes mellitus, type 2   Dyslipidemia   Hemorrhoids  History of breast cancer right, 2018  Hypertension   Lung cancer , right lobe. treated with lobectomy  Obesity (BMI 30-39.9)   Onychomycosis of toenail 1st right toe  Osteoarthritis of both knees   Pedal edema   Urinary Urgency     PAST SURGICAL HISTORY:  History of D&C 2007  History of lobectomy of lung , right upper lobe  Status Post Breast Lumpectomy right 2018. Completed RT 10/2018    Other notable history:  3 abortions ( 5 para )    PST values of interest:  HbA1c 7.0%  aPTT 27.9 (¯)  SCr 0.84  LFTs WNL  QTc 454ms (borderline)    1.
Discharge medication calendar:  Eliquis 2.5mg q12h x 12 days then ASA EC 81mg q12h x 4 weeks  APAP 1000mg q12h x 2-3 weeks  Celecoxib 200mg q12h x 2-3 weeks  Pantoprazole 40mg QAM x 6 weeks  Narcotic PRN  Docusate 100mg TID while taking narcotic  Miralax, Senna, or Bisacodyl PRN for treatment of constipation
ORTHOPEDIC PA PROGRESS NOTE  LING RAMOS      74y Female                                                                                                                               POD #    4d STAGED    STATUS POST:       Procedure: Total left knee replacement           No complaints.    NAD pt resting comfortable  Current Pain Management:  PRN    Vital Signs Last 24 Hrs  T(C): 37.2 (18 May 2019 23:18), Max: 37.2 (18 May 2019 23:18)  T(F): 98.9 (18 May 2019 23:18), Max: 98.9 (18 May 2019 23:18)  HR: 67 (19 May 2019 06:20) (65 - 78)  BP: 156/80 (19 May 2019 06:20) (156/80 - 179/81)  BP(mean): --  RR: 18 (18 May 2019 23:18) (18 - 18)  SpO2: 92% (18 May 2019 23:18) (92% - 96%)T(F): --  HR: 67  BP: 156/80  RR: --  SpO2: --                         10.7   5.66  )-----------( 185      ( 19 May 2019 06:50 )             31.7         05-19    143  |  104  |  12  ----------------------------<  127<H>  3.4<L>   |  33<H>  |  0.69    Ca    8.7      19 May 2019 06:50    PT/INR - ( 19 May 2019 06:50 )   PT: 11.8 sec;   INR: 1.08 ratio           Physical Exam :    -   Dressing changed sterile.   -   Wound C/D/I.   -   Distal Neurvascular status intact grossly.   -   Warm well perfused; capillary refill <3 seconds   -   (+)EHL/FHL   -   (+) Sensation to light touch  -   (-) Calf tenderness Bilaterally        A/P: 74y Female s/p Total left knee replacement     -   Ortho Stable  -   Pain control:  PRN  -   Medicine to follow  -   DVT ppx:    PAS +  enoxaparin Injectable: 30 milliGRAM(s) SubCutaneous, enoxaparin Injectable: 30 milliGRAM(s) SubCutaneous  -   Weight bearing status: WBAT  -   Pre-op for STAGED procedure
ORTHOPEDIC PA PROGRESS NOTE  LING RAMOS      74y Female                                                                                                                               POD #2        STATUS POST:               Pre-Op Dx: Osteoarthritis of left knee    Post-Op Dx:  Osteoarthritis of left knee    Procedure: Total left knee replacement                                              Patient comfortable pain controlled.  Voiding urine passing gas and tolerating p.o diet.  No complaints  Current Pain Management:  [ ] PCA   [x ] Po Analgesics [x ] IM /IV Anagesics     T(F): 97.6  HR: 71  BP: 146/84  RR: 18  SpO2: 92%                        10.7   8.85  )-----------( 164      ( 17 May 2019 07:22 )             31.7                     05-16    145  |  104  |  14  ----------------------------<  198<H>  3.5   |  29  |  0.96    Ca    9.1      16 May 2019 07:11      Physical Exam :    -  Surgical site clean and dry prineo in place.  No errythema discharge or fluctulance  -   Distal Neurvascular status intact grossly.   -   Warm well perfused; capillary refill <3 seconds   -   (+)EHL/FHL 5/5 dorsi/plantar flexion 5/5  -   (+) Sensation to light touch  -   (-) Calf tenderness Bilaterally    A/P: 74y Female s/p Osteoarthritis of left knee    -   Ortho Stable  -   Pain control   -   Medicine to follow  -   DVT ppx:     [ x]SCDs     [ ] ASA     [ ] Eliquis     [ x] Lovenox  -   Weight bearing status:  WBAT [x ]        PWB    [ ]     TTWB  [ ]      NWB  [ ]   -  Dispo:     Home [ ]     Acute Rehab [ ]     [facility] [ ]     TBD [x ]  -  Staged for Right total knee monday.  US doppler tommorrow and pre-labs sunday
ORTHOPEDIC PA PROGRESS NOTE  LINGRENETTA RAMOS      74y Female                                                                                                                               POD #  1  POD # 6 Lt TKA      STATUS POST:               Pre-Op Dx: Right knee DJD  Right knee DJD  Osteoarthritis of left knee    Post-Op Dx:  Right knee DJD  Osteoarthritis of left knee    Procedure: Total knee replacement  Total left knee replacement                                                Pain (0-10):   Current Pain Management:  [ ] PCA   [x ] Po Analgesics [ ] IM /IV Anagesics     T(F): 97.6  HR: 86  BP: 185/80  RR: 16  SpO2: 95%                        10.9   x     )-----------( x        ( 20 May 2019 21:15 )             32.2                     05-20    144  |  104  |  15  ----------------------------<  233<H>  4.0   |  32<H>  |  0.89    Ca    8.8      20 May 2019 21:15      Physical Exam :    -   Dressing  C/D/I.   -   Distal Neurvascular status intact grossly.   -   Warm well perfused; capillary refill <3 seconds   -   (+)EHL/FHL 5/5  -   (+) Sensation to light touch  -   (-) Calf tenderness Bilaterally    A/P: 74y Female s/p Right knee DJD  Osteoarthritis of left knee    -   Ortho Stable  -   Pain control   -   Medicine to follow  -   DVT ppx:     [x ]SCDs     [ ] ASA     [ x] Eliquis     [ ] Lovenox  -   Weight bearing status:  WBAT [x ]        PWB    [ ]     TTWB  [ ]      NWB  [ ]   -  Dispo:     Home [ ]     Acute Rehab [ ]     [facility] [x ]     TBD [ ]
Orthopaedic Post Op Note    Procedure: Left TKR  Surgeon: Arturo Hidalgo    74y Female comfortable, without complaints. Reported pain score = 0  Denies N/V, CP, SOB, numbness/tingling of extremities.    PE:  Vital Signs Last 24 Hrs  T(C): 36.6 (15 May 2019 15:45), Max: 36.6 (15 May 2019 15:45)  T(F): 97.9 (15 May 2019 15:45), Max: 97.9 (15 May 2019 15:45)  HR: 71 (15 May 2019 15:45) (55 - 71)  BP: 160/75 (15 May 2019 15:45) (63/74 - 180/67)  RR: 15 (15 May 2019 15:45) (13 - 19)  SpO2: 100% (15 May 2019 15:45) (93% - 100%)  General: Pt alert and oriented   Lungs: + BS CTA bilaterally  Heart: +S1 & S2 heard, RRR  Abd: + BS heard, soft, NT, ND  Left Knee Dressing: C/D/I   Bilateral LEs:  Motor:   5/5 dorsiflexion, plantarflexion, EHL  Sensation intact to LT   2+ DP Pulses  SCDs in place    A/P: 74y Female POD#0 s/p Left TKR  - Stable  - Acetaminophen, Celebrex, Dilaudid/Oxycodone for Pain Control   - DVT ppx: Lovenox 30 mg q 12 h  - Isi op IV abx: Ancef  - PT, OT per protocol  - F/U AM Labs  Patient is staged for Right TKR on 5/20/19
Orthopaedic Post Op Note    Procedure: Right TKR  Surgeon: Arturo Hidalgo    74y Female comfortable, without complaints. Tolerating diet. Reported pain score = 2  Denies N/V, CP, SOB, numbness/tingling of extremities.    PE:  Vital Signs Last 24 Hrs  T(C): 36.3 (20 May 2019 16:40), Max: 36.9 (19 May 2019 23:30)  T(F): 97.4 (20 May 2019 16:40), Max: 98.5 (20 May 2019 10:35)  HR: 79 (20 May 2019 16:40) (62 - 79)  BP: 173/78 (20 May 2019 16:40) (123/50 - 175/62)  RR: 18 (20 May 2019 16:00) (12 - 18)  SpO2: 98% (20 May 2019 16:00) (93% - 100%)  General: Pt alert and oriented   Lungs: + BS CTA bilaterally  Heart: +S1 & S2 heard, RRR  Abd: + BS heard, soft, NT, ND  Right Knee Dressing: C/D/I   Left Knee: incision CDI, prineo tape in place  Bilateral LEs:  Motor:   5/5 dorsiflexion, plantarflexion, EHL  Sensation intact to LT   2+ DP Pulses      A/P: 74y Female POD#0 s/p Right TKR, POD#5 s/p Left TKR  - Stable  - Acetaminophen, Celebrex, Dilaudid/Oxycodone for Pain Control   - DVT ppx: Eliquis  - Isi op IV abx: Vancomycin  - PT, OT per protocol  - F/U AM Labs  DCP = rehab Wednesday pending medical clearance
POST OPERATIVE DAY #:  [ 1]   STATUS POST: [ x] Left [ ] Right  [x ]TKR [ ]THR                    STAGE 1    Patient is a 74y old  Female who presents with a chief complaint of     Pain well controlled    OBJECTIVE:     Vital Signs Last 24 Hrs  T(C): 36.7 (16 May 2019 06:21), Max: 36.7 (16 May 2019 06:21)  T(F): 98 (16 May 2019 06:21), Max: 98 (16 May 2019 06:21)  HR: 75 (16 May 2019 06:21) (55 - 96)  BP: 177/76 (16 May 2019 06:21) (63/74 - 180/67)  BP(mean): --  RR: 17 (16 May 2019 06:21) (13 - 19)  SpO2: 98% (16 May 2019 06:21) (90% - 100%)    Affected extremity:          Dressing:  clean/dry/intact           Sensation; intact to light touch           Motor exam: Toes warm and mobile well perfused;  +capillary refill         No calf tenderness bilateral LE's    LABS:                        12.5   12.74 )-----------( 179      ( 16 May 2019 07:11 )             35.6     05-16    145  |  104  |  14  ----------------------------<  198<H>  3.5   |  29  |  0.96    Ca    9.1      16 May 2019 07:11              A/P :    -    Analgesics PRN  -    DVT ppx: [ ]ASA 81 bid [x ] Lovenox [ ] Coumadin     -    Weight bearing status: WBAT [ ]        PWB    [ ]     TTWB  [ ]      NWB  [ ]  -    Physical Therapy  -    Occupational Therapy  -    Dispo: Home [ ]     Rehab [ ]      [facility] [ ]      To be determined [ ]  -   Stage 2 5/20/19
POST OPERATIVE DAY #:  [ 3]   STATUS POST: [ x] Left [ ] Right  [ x]TKR [ ]THR                    STAGED    Patient is a 74y old  Female who presents with a chief complaint of staged TKR (18 May 2019 09:47)      Pain well controlled    OBJECTIVE:     Vital Signs Last 24 Hrs  T(C): 36.4 (18 May 2019 08:04), Max: 36.9 (17 May 2019 15:00)  T(F): 97.6 (18 May 2019 08:04), Max: 98.4 (17 May 2019 15:00)  HR: 61 (18 May 2019 08:04) (60 - 86)  BP: 134/78 (18 May 2019 08:04) (134/78 - 179/81)  BP(mean): --  RR: 18 (18 May 2019 08:04) (18 - 20)  SpO2: 90% (18 May 2019 08:04) (90% - 97%)    Affected extremity:          prineo Dressing:  clean/dry/intact         Sensation; intact to light touch           Motor exam: Toes warm and mobile well perfused;  +capillary refill         No calf tenderness bilateral LE's    LABS:                        10.2   6.80  )-----------( 155      ( 18 May 2019 06:54 )             30.8     05-18    147<H>  |  107  |  17  ----------------------------<  125<H>  3.8   |  33<H>  |  0.84    Ca    8.6      18 May 2019 06:53              A/P :    -    Analgesics PRN  -    DVT ppx: [ ]ASA 81 bid [ x] Lovenox [ ] Coumadin   [ ] Eliquis   -    Weight bearing status: WBAT [x ]        PWB    [ ]     TTWB  [ ]      NWB  [ ]  -    Physical Therapy  -    Occupational Therapy  -    Dispo: Home [ ]     Rehab [ ]      F[facility] [ ]      To be determined [x ]  -    STAGE 2 5/20/19
Patient is a 74y old  Female who presents with a chief complaint of     Subjective: reports sleepiness after oxy 10 - will lower dose to 5mg for severe pain    MEDICATIONS  (STANDING):  acetaminophen   Tablet .. 1000 milliGRAM(s) Oral every 8 hours  anastrozole 1 milliGRAM(s) Oral daily  atorvastatin 10 milliGRAM(s) Oral at bedtime  carvedilol 6.25 milliGRAM(s) Oral every 12 hours  celecoxib 200 milliGRAM(s) Oral two times a day  cloNIDine 0.2 milliGRAM(s) Oral two times a day  dextrose 5%. 1000 milliLiter(s) (50 mL/Hr) IV Continuous <Continuous>  dextrose 5%. 1000 milliLiter(s) (50 mL/Hr) IV Continuous <Continuous>  dextrose 50% Injectable 12.5 Gram(s) IV Push once  dextrose 50% Injectable 25 Gram(s) IV Push once  dextrose 50% Injectable 25 Gram(s) IV Push once  dextrose 50% Injectable 12.5 Gram(s) IV Push once  dextrose 50% Injectable 25 Gram(s) IV Push once  dextrose 50% Injectable 25 Gram(s) IV Push once  docusate sodium 100 milliGRAM(s) Oral three times a day  enoxaparin Injectable 30 milliGRAM(s) SubCutaneous every 12 hours  insulin glargine Injectable (LANTUS) 50 Unit(s) SubCutaneous every morning  insulin lispro (HumaLOG) corrective regimen sliding scale   SubCutaneous three times a day before meals  insulin lispro (HumaLOG) corrective regimen sliding scale   SubCutaneous at bedtime  insulin lispro Injectable (HumaLOG) 10 Unit(s) SubCutaneous three times a day before meals  lactated ringers. 1000 milliLiter(s) (75 mL/Hr) IV Continuous <Continuous>  lactated ringers. 1000 milliLiter(s) (125 mL/Hr) IV Continuous <Continuous>  losartan 100 milliGRAM(s) Oral daily  pantoprazole    Tablet 40 milliGRAM(s) Oral before breakfast  senna 2 Tablet(s) Oral at bedtime    MEDICATIONS  (PRN):  aluminum hydroxide/magnesium hydroxide/simethicone Suspension 30 milliLiter(s) Oral four times a day PRN Indigestion  bisacodyl 5 milliGRAM(s) Oral every 12 hours PRN Constipation  bisacodyl Suppository 10 milliGRAM(s) Rectal daily PRN If no bowel movement by postoperative day #2  dextrose 40% Gel 15 Gram(s) Oral once PRN Blood Glucose LESS THAN 70 milliGRAM(s)/deciliter  dextrose 40% Gel 15 Gram(s) Oral once PRN Blood Glucose LESS THAN 70 milliGRAM(s)/deciLiter  glucagon  Injectable 1 milliGRAM(s) IntraMuscular once PRN Glucose <70 milliGRAM(s)/deciLiter  glucagon  Injectable 1 milliGRAM(s) IntraMuscular once PRN Glucose LESS THAN 70 milligrams/deciliter  magnesium hydroxide Suspension 30 milliLiter(s) Oral daily PRN Constipation  oxyCODONE    IR 5 milliGRAM(s) Oral every 4 hours PRN Severe Pain (7 - 10)  polyethylene glycol 3350 17 Gram(s) Oral daily PRN Constipation      Allergies    No Known Drug Allergies  shellfish (Vomiting)    Intolerances        REVIEW OF SYSTEMS:  negative unless otherwise specified above.    Vital Signs Last 24 Hrs  T(C): 36.4 (18 May 2019 08:04), Max: 36.9 (17 May 2019 15:00)  T(F): 97.6 (18 May 2019 08:04), Max: 98.4 (17 May 2019 15:00)  HR: 61 (18 May 2019 08:04) (60 - 86)  BP: 134/78 (18 May 2019 08:04) (134/78 - 179/81)  BP(mean): --  RR: 18 (18 May 2019 08:04) (18 - 20)  SpO2: 90% (18 May 2019 08:04) (90% - 97%)    PHYSICAL EXAM:  GENERAL: No apparent distress  HEAD:  Atraumatic, Normocephalic  EYES: conjunctiva and sclera clear  ENMT: Moist mucous membranes  NECK: Supple  CHEST/LUNG: Clear to auscultation bilaterally  HEART: Regular rate and rhythm  ABDOMEN: Soft, Nontender, Nondistended; Bowel sounds present  EXTREMITIES:  No clubbing, cyanosis or edema  SKIN: No rashes or lesions  NERVOUS SYSTEM:  Alert & Oriented X3; Bilateral Lower extremity mobile, sensation to light touch intact  INCISION:  Dressing dry and intact        LABS:   Hematocrit: 30.8 % <L> (05-18 @ 06:54)  Platelet Count - Automated: 155 K/uL (05-18 @ 06:54)  RBC Count: 3.78 M/uL <L> (05-18 @ 06:54)  Hemoglobin: 10.2 g/dL <L> (05-18 @ 06:54)  WBC Count: 6.80 K/uL (05-18 @ 06:54)      05-18    147<H>  |  107  |  17  ----------------------------<  125<H>  3.8   |  33<H>  |  0.84    Ca    8.6      18 May 2019 06:53                                        IMAGING: images reviewed personally      Consultant Notes Reviewed and Care Discussed with relevant Consultants/Other Providers.
Patient is a 74y old  Female who presents with a chief complaint of Bilateral TKR (22 May 2019 11:09)    INTERVAL HPI/OVERNIGHT EVENTS: felt nauseated this AM, but better now,  pain this am has improved.     MEDICATIONS  (STANDING):  acetaminophen   Tablet .. 1000 milliGRAM(s) Oral every 8 hours  anastrozole 1 milliGRAM(s) Oral daily  apixaban 2.5 milliGRAM(s) Oral every 12 hours  atorvastatin 10 milliGRAM(s) Oral at bedtime  carvedilol 6.25 milliGRAM(s) Oral every 12 hours  celecoxib 200 milliGRAM(s) Oral two times a day  chlorhexidine 2% Cloths 1 Application(s) Topical once  cloNIDine 0.2 milliGRAM(s) Oral two times a day  dextrose 5%. 1000 milliLiter(s) (50 mL/Hr) IV Continuous <Continuous>  dextrose 50% Injectable 12.5 Gram(s) IV Push once  dextrose 50% Injectable 25 Gram(s) IV Push once  dextrose 50% Injectable 25 Gram(s) IV Push once  docusate sodium 100 milliGRAM(s) Oral three times a day  insulin glargine Injectable (LANTUS) 50 Unit(s) SubCutaneous every morning  insulin lispro (HumaLOG) corrective regimen sliding scale   SubCutaneous three times a day before meals  insulin lispro (HumaLOG) corrective regimen sliding scale   SubCutaneous at bedtime  insulin lispro Injectable (HumaLOG) 10 Unit(s) SubCutaneous three times a day before meals  losartan 100 milliGRAM(s) Oral daily  pantoprazole    Tablet 40 milliGRAM(s) Oral before breakfast  senna 2 Tablet(s) Oral at bedtime  tranexamic acid 4% Topical Irrigation 1 Application(s) IntraArticular once    MEDICATIONS  (PRN):  aluminum hydroxide/magnesium hydroxide/simethicone Suspension 30 milliLiter(s) Oral four times a day PRN Indigestion  bisacodyl 10 milliGRAM(s) Oral daily PRN Constipation  bisacodyl Suppository 10 milliGRAM(s) Rectal daily PRN If no bowel movement by postoperative day #2  dextrose 40% Gel 15 Gram(s) Oral once PRN Blood Glucose LESS THAN 70 milliGRAM(s)/deciliter  glucagon  Injectable 1 milliGRAM(s) IntraMuscular once PRN Glucose LESS THAN 70 milligrams/deciliter  HYDROmorphone  Injectable 0.5 milliGRAM(s) IV Push every 3 hours PRN Severe Pain (7 - 10)  magnesium hydroxide Suspension 30 milliLiter(s) Oral daily PRN Constipation  ondansetron Injectable 4 milliGRAM(s) IV Push every 6 hours PRN Nausea and/or Vomiting  oxyCODONE    IR 5 milliGRAM(s) Oral every 3 hours PRN Mild Pain (1 - 3)  oxyCODONE    IR 10 milliGRAM(s) Oral every 3 hours PRN Moderate Pain (4 - 6)  polyethylene glycol 3350 17 Gram(s) Oral daily PRN Constipation      Allergies  No Known Drug Allergies  shellfish (Vomiting)    REVIEW OF SYSTEMS:  CONSTITUTIONAL: No fever, weight loss, or fatigue  EYES: No eye pain, visual disturbances, or discharge  ENMT:  No difficulty hearing, tinnitus, vertigo; No sinus or throat pain  NECK: No pain or stiffness  BREASTS: No pain, masses, or nipple discharge  RESPIRATORY: No cough, wheezing, chills or hemoptysis; No shortness of breath  CARDIOVASCULAR: No chest pain, palpitations, or lightheadedness  GASTROINTESTINAL: No abdominal or epigastric pain. No nausea, vomiting, or hematemesis; No diarrhea or constipation. No melena or hematochezia.  GENITOURINARY: No dysuria, frequency, hematuria, or incontinence  NEUROLOGICAL: No headaches, vertigo, memory loss, loss of strength, numbness, or tremors  SKIN: No itching, burning, rashes, or lesions   LYMPH NODES: No enlarged glands  ENDOCRINE: No heat or cold intolerance; No hair loss; No polydipsia or polyuria  MUSCULOSKELETAL: No back pain  PSYCHIATRIC: No depression, anxiety, or mood swings  HEME/LYMPH: No easy bruising, or bleeding gums  ALLERGY AND IMMUNOLOGIC: No hives or eczema    Vital Signs Last 24 Hrs  T(C): 36.5 (22 May 2019 11:07), Max: 36.8 (21 May 2019 15:00)  T(F): 97.7 (22 May 2019 11:07), Max: 98.2 (21 May 2019 15:00)  HR: 68 (22 May 2019 11:07) (67 - 98)  BP: 118/74 (22 May 2019 11:07) (110/67 - 173/84)  BP(mean): --  RR: 18 (22 May 2019 11:07) (16 - 18)  SpO2: 93% (22 May 2019 11:07) (91% - 96%)    PHYSICAL EXAM:  GENERAL: NAD, well-groomed, well-developed  HEAD:  Atraumatic, Normocephalic  EYES: EOMI, PERRLA, conjunctiva and sclera clear  ENMT: Moist mucous membranes,  NECK: Supple, No JVD  NERVOUS SYSTEM:  Alert & Oriented X3, Good concentration; Bilateral LE mobile, sensation to light touch intact  CHEST/LUNG: Clear to auscultation bilaterally; No rales, rhonchi, wheezing, or rubs  HEART: Regular rate and rhythm; No murmurs, rubs, or gallops  ABDOMEN: Soft, Nontender, Nondistended; Bowel sounds present  EXTREMITIES:  2+ Peripheral Pulses, No clubbing or cyanosis  LYMPH: No lymphadenopathy noted  SKIN: No rashes or lesions  INCISION:  Dressing dry and intact    LABS:                        8.9    8.25  )-----------( 180      ( 22 May 2019 07:14 )             26.8     22 May 2019 07:14    138    |  100    |  22     ----------------------------<  162    3.8     |  33     |  1.05     Ca    8.3        22 May 2019 07:14          CAPILLARY BLOOD GLUCOSE  POCT Blood Glucose.: 126 mg/dL (22 May 2019 12:04)  POCT Blood Glucose.: 175 mg/dL (22 May 2019 07:35)  POCT Blood Glucose.: 111 mg/dL (21 May 2019 21:46)  POCT Blood Glucose.: 160 mg/dL (21 May 2019 16:32)  POCT Blood Glucose.: 244 mg/dL (21 May 2019 13:26)      RADIOLOGY & ADDITIONAL TESTS:    Imaging Personally Reviewed:      [ ] Consultant(s) Notes Reviewed  [x] Care Discussed with Consultants/Other Providers:  Ortho PA- plan of care
Patient is a 74y old  Female who presents with a chief complaint of knee pain    INTERVAL HPI/OVERNIGHT EVENTS: pain last night is improved,  today she is sleepy.    MEDICATIONS  (STANDING):  acetaminophen   Tablet .. 1000 milliGRAM(s) Oral every 8 hours  anastrozole 1 milliGRAM(s) Oral daily  atorvastatin 10 milliGRAM(s) Oral at bedtime  carvedilol 6.25 milliGRAM(s) Oral every 12 hours  celecoxib 200 milliGRAM(s) Oral two times a day  cloNIDine 0.2 milliGRAM(s) Oral two times a day  dextrose 5%. 1000 milliLiter(s) (50 mL/Hr) IV Continuous <Continuous>  dextrose 5%. 1000 milliLiter(s) (50 mL/Hr) IV Continuous <Continuous>  dextrose 50% Injectable 12.5 Gram(s) IV Push once  dextrose 50% Injectable 25 Gram(s) IV Push once  dextrose 50% Injectable 25 Gram(s) IV Push once  dextrose 50% Injectable 12.5 Gram(s) IV Push once  dextrose 50% Injectable 25 Gram(s) IV Push once  dextrose 50% Injectable 25 Gram(s) IV Push once  docusate sodium 100 milliGRAM(s) Oral three times a day  enoxaparin Injectable 30 milliGRAM(s) SubCutaneous every 12 hours  insulin glargine Injectable (LANTUS) 50 Unit(s) SubCutaneous every morning  insulin lispro (HumaLOG) corrective regimen sliding scale   SubCutaneous three times a day before meals  insulin lispro (HumaLOG) corrective regimen sliding scale   SubCutaneous at bedtime  insulin lispro Injectable (HumaLOG) 10 Unit(s) SubCutaneous three times a day before meals  lactated ringers. 1000 milliLiter(s) (75 mL/Hr) IV Continuous <Continuous>  lactated ringers. 1000 milliLiter(s) (125 mL/Hr) IV Continuous <Continuous>  losartan 100 milliGRAM(s) Oral daily  pantoprazole    Tablet 40 milliGRAM(s) Oral before breakfast  potassium chloride    Tablet ER 40 milliEquivalent(s) Oral once  senna 2 Tablet(s) Oral at bedtime    MEDICATIONS  (PRN):  aluminum hydroxide/magnesium hydroxide/simethicone Suspension 30 milliLiter(s) Oral four times a day PRN Indigestion  bisacodyl Suppository 10 milliGRAM(s) Rectal daily PRN If no bowel movement by postoperative day #2  dextrose 40% Gel 15 Gram(s) Oral once PRN Blood Glucose LESS THAN 70 milliGRAM(s)/deciliter  dextrose 40% Gel 15 Gram(s) Oral once PRN Blood Glucose LESS THAN 70 milliGRAM(s)/deciLiter  glucagon  Injectable 1 milliGRAM(s) IntraMuscular once PRN Glucose <70 milliGRAM(s)/deciLiter  glucagon  Injectable 1 milliGRAM(s) IntraMuscular once PRN Glucose LESS THAN 70 milligrams/deciliter  HYDROmorphone  Injectable 0.5 milliGRAM(s) IV Push every 3 hours PRN Severe Pain (7 - 10)  magnesium hydroxide Suspension 30 milliLiter(s) Oral daily PRN Constipation  oxyCODONE    IR 5 milliGRAM(s) Oral every 3 hours PRN Mild Pain (1 - 3)  oxyCODONE    IR 10 milliGRAM(s) Oral every 3 hours PRN Moderate Pain (4 - 6)  polyethylene glycol 3350 17 Gram(s) Oral daily PRN Constipation      Allergies  No Known Drug Allergies  shellfish (Vomiting)    REVIEW OF SYSTEMS:  CONSTITUTIONAL: No fever, weight loss, or fatigue  EYES: No eye pain, visual disturbances, or discharge  ENMT:  No difficulty hearing, tinnitus, vertigo; No sinus or throat pain  NECK: No pain or stiffness  BREASTS: No pain, masses, or nipple discharge  RESPIRATORY: No cough, wheezing, chills or hemoptysis; No shortness of breath  CARDIOVASCULAR: No chest pain, palpitations, or lightheadedness  GASTROINTESTINAL: No abdominal or epigastric pain. No nausea, vomiting, or hematemesis; No diarrhea or constipation. No melena or hematochezia.  GENITOURINARY: No dysuria, frequency, hematuria, or incontinence  NEUROLOGICAL: No headaches, vertigo, memory loss, loss of strength, numbness, or tremors  SKIN: No itching, burning, rashes, or lesions   LYMPH NODES: No enlarged glands  ENDOCRINE: No heat or cold intolerance; No hair loss; No polydipsia or polyuria  MUSCULOSKELETAL: No back pain  PSYCHIATRIC: No depression, anxiety, or mood swings  HEME/LYMPH: No easy bruising, or bleeding gums  ALLERGY AND IMMUNOLOGIC: No hives or eczema    Vital Signs Last 24 Hrs  T(C): 36.4 (17 May 2019 07:39), Max: 36.8 (16 May 2019 15:00)  T(F): 97.6 (17 May 2019 07:39), Max: 98.3 (16 May 2019 15:00)  HR: 75 (17 May 2019 11:00) (64 - 75)  BP: 146/84 (17 May 2019 07:39) (146/84 - 166/75)  BP(mean): --  RR: 18 (17 May 2019 07:39) (16 - 18)  SpO2: 94% (17 May 2019 11:00) (92% - 100%)    PHYSICAL EXAM:  GENERAL: NAD, well-groomed, well-developed  HEAD: conjunctiva and sclera clear  ENMT: Moist mucous membranes  NECK: Supple, No JVD  NERVOUS SYSTEM:  Alert & Oriented X3, Good concentration; Bilateral LE mobile, sensation to light touch intact  CHEST/LUNG: Clear to auscultation bilaterally; No rales, rhonchi, wheezing, or rubs  HEART: Regular rate and rhythm; No murmurs, rubs, or gallops  ABDOMEN: Soft, Nontender, Nondistended; Bowel sounds present  EXTREMITIES:  2+ Peripheral Pulses, No clubbing or cyanosis  LYMPH: No lymphadenopathy noted  SKIN: No rashes or lesions  INCISION:  Dressing dry and intact    LABS:                        10.7   8.85  )-----------( 164      ( 17 May 2019 07:22 )             31.7     17 May 2019 07:22    140    |  103    |  20     ----------------------------<  138    3.4     |  31     |  0.81     Ca    8.6        17 May 2019 07:22          CAPILLARY BLOOD GLUCOSE  POCT Blood Glucose.: 142 mg/dL (17 May 2019 12:03)  POCT Blood Glucose.: 135 mg/dL (17 May 2019 07:32)  POCT Blood Glucose.: 200 mg/dL (16 May 2019 21:16)  POCT Blood Glucose.: 171 mg/dL (16 May 2019 16:17)      RADIOLOGY & ADDITIONAL TESTS:    Imaging Personally Reviewed:      [ ] Consultant(s) Notes Reviewed  [x] Care Discussed with Consultants/Other Providers:  Ortho PA- plan of care
Patient is a 74y old  Female who presents with a chief complaint of staged TKR (18 May 2019 09:47)      Subjective: feels well.  constipated    MEDICATIONS  (STANDING):  acetaminophen   Tablet .. 1000 milliGRAM(s) Oral every 8 hours  anastrozole 1 milliGRAM(s) Oral daily  atorvastatin 10 milliGRAM(s) Oral at bedtime  carvedilol 6.25 milliGRAM(s) Oral every 12 hours  cloNIDine 0.2 milliGRAM(s) Oral two times a day  dextrose 5%. 1000 milliLiter(s) (50 mL/Hr) IV Continuous <Continuous>  dextrose 5%. 1000 milliLiter(s) (50 mL/Hr) IV Continuous <Continuous>  dextrose 50% Injectable 12.5 Gram(s) IV Push once  dextrose 50% Injectable 25 Gram(s) IV Push once  dextrose 50% Injectable 25 Gram(s) IV Push once  dextrose 50% Injectable 12.5 Gram(s) IV Push once  dextrose 50% Injectable 25 Gram(s) IV Push once  dextrose 50% Injectable 25 Gram(s) IV Push once  docusate sodium 100 milliGRAM(s) Oral three times a day  insulin lispro (HumaLOG) corrective regimen sliding scale   SubCutaneous three times a day before meals  insulin lispro (HumaLOG) corrective regimen sliding scale   SubCutaneous at bedtime  insulin lispro Injectable (HumaLOG) 10 Unit(s) SubCutaneous three times a day before meals  lactated ringers. 1000 milliLiter(s) (75 mL/Hr) IV Continuous <Continuous>  losartan 100 milliGRAM(s) Oral daily  mineral oil enema 133 milliLiter(s) Rectal once  pantoprazole    Tablet 40 milliGRAM(s) Oral before breakfast  potassium chloride   Powder 20 milliEquivalent(s) Oral once  senna 2 Tablet(s) Oral at bedtime    MEDICATIONS  (PRN):  aluminum hydroxide/magnesium hydroxide/simethicone Suspension 30 milliLiter(s) Oral four times a day PRN Indigestion  bisacodyl 5 milliGRAM(s) Oral every 12 hours PRN Constipation  bisacodyl Suppository 10 milliGRAM(s) Rectal daily PRN If no bowel movement by postoperative day #2  dextrose 40% Gel 15 Gram(s) Oral once PRN Blood Glucose LESS THAN 70 milliGRAM(s)/deciLiter  dextrose 40% Gel 15 Gram(s) Oral once PRN Blood Glucose LESS THAN 70 milliGRAM(s)/deciliter  glucagon  Injectable 1 milliGRAM(s) IntraMuscular once PRN Glucose <70 milliGRAM(s)/deciLiter  glucagon  Injectable 1 milliGRAM(s) IntraMuscular once PRN Glucose LESS THAN 70 milligrams/deciliter  magnesium hydroxide Suspension 30 milliLiter(s) Oral daily PRN Constipation  oxyCODONE    IR 5 milliGRAM(s) Oral every 4 hours PRN Severe Pain (7 - 10)  polyethylene glycol 3350 17 Gram(s) Oral daily PRN Constipation      Allergies    No Known Drug Allergies  shellfish (Vomiting)    Intolerances        REVIEW OF SYSTEMS:  negative unless otherwise specified above.    Vital Signs Last 24 Hrs  T(C): 36.6 (19 May 2019 07:04), Max: 37.2 (18 May 2019 23:18)  T(F): 97.9 (19 May 2019 07:04), Max: 98.9 (18 May 2019 23:18)  HR: 62 (19 May 2019 07:04) (62 - 78)  BP: 138/78 (19 May 2019 07:04) (138/78 - 179/81)  BP(mean): --  RR: 18 (19 May 2019 07:04) (18 - 18)  SpO2: 92% (19 May 2019 07:04) (92% - 96%)    PHYSICAL EXAM:  GENERAL: No apparent distress  HEAD:  Atraumatic, Normocephalic  EYES: conjunctiva and sclera clear  ENMT: Moist mucous membranes  NECK: Supple  CHEST/LUNG: Clear to auscultation bilaterally  HEART: Regular rate and rhythm  ABDOMEN: Soft, Nontender, Nondistended; Bowel sounds present  EXTREMITIES:  No clubbing, cyanosis or edema  SKIN: No rashes or lesions  NERVOUS SYSTEM:  Alert & Oriented X3; Bilateral Lower extremity mobile, sensation to light touch intact  INCISION:  Dressing dry and intact        LABS:   WBC Count: 5.66 K/uL (05-19 @ 06:50)  Platelet Count - Automated: 185 K/uL (05-19 @ 06:50)  Hematocrit: 31.7 % <L> (05-19 @ 06:50)  RBC Count: 3.88 M/uL (05-19 @ 06:50)  Hemoglobin: 10.7 g/dL <L> (05-19 @ 06:50)      05-19    143  |  104  |  12  ----------------------------<  127<H>  3.4<L>   |  33<H>  |  0.69    Ca    8.7      19 May 2019 06:50                  PT/INR - ( 19 May 2019 06:50 )   PT: 11.8 sec;   INR: 1.08 ratio         PTT - ( 19 May 2019 07:58 )  PTT:30.7 sec                      IMAGING: images reviewed personally      Consultant Notes Reviewed and Care Discussed with relevant Consultants/Other Providers.
Patient is a 74y old  Female who presents with a chief complaint of staged TKR (19 May 2019 11:25)      INTERVAL HPI/OVERNIGHT EVENTS: feeling well in pacu, not having any pain at the moment.  no new symptoms, feels ready for PT.       MEDICATIONS  (STANDING):  dextrose 5%. 1000 milliLiter(s) (50 mL/Hr) IV Continuous <Continuous>  dextrose 50% Injectable 12.5 Gram(s) IV Push once  dextrose 50% Injectable 25 Gram(s) IV Push once  dextrose 50% Injectable 25 Gram(s) IV Push once  insulin lispro (HumaLOG) corrective regimen sliding scale   SubCutaneous three times a day before meals  insulin lispro (HumaLOG) corrective regimen sliding scale   SubCutaneous at bedtime  insulin lispro Injectable (HumaLOG) 6 Unit(s) SubCutaneous three times a day before meals  lactated ringers. 1000 milliLiter(s) (75 mL/Hr) IV Continuous <Continuous>    MEDICATIONS  (PRN):  dextrose 40% Gel 15 Gram(s) Oral once PRN Blood Glucose LESS THAN 70 milliGRAM(s)/deciliter  glucagon  Injectable 1 milliGRAM(s) IntraMuscular once PRN Glucose LESS THAN 70 milligrams/deciliter  HYDROmorphone  Injectable 0.5 milliGRAM(s) IV Push every 10 minutes PRN Moderate Pain (4 - 6)  ondansetron Injectable 4 milliGRAM(s) IV Push once PRN Nausea and/or Vomiting      Allergies  No Known Drug Allergies  shellfish (Vomiting)    REVIEW OF SYSTEMS:  CONSTITUTIONAL: No fever, weight loss, or fatigue  EYES: No eye pain, visual disturbances, or discharge  ENMT:  No difficulty hearing, tinnitus, vertigo; No sinus or throat pain  NECK: No pain or stiffness  BREASTS: No pain, masses, or nipple discharge  RESPIRATORY: No cough, wheezing, chills or hemoptysis; No shortness of breath  CARDIOVASCULAR: No chest pain, palpitations, or lightheadedness  GASTROINTESTINAL: No abdominal or epigastric pain. No nausea, vomiting, or hematemesis; No diarrhea or constipation. No melena or hematochezia.  GENITOURINARY: No dysuria, frequency, hematuria, or incontinence  NEUROLOGICAL: No headaches, vertigo, memory loss, loss of strength, numbness, or tremors  SKIN: No itching, burning, rashes, or lesions   LYMPH NODES: No enlarged glands  ENDOCRINE: No heat or cold intolerance; No hair loss; No polydipsia or polyuria  MUSCULOSKELETAL: No back pain  PSYCHIATRIC: No depression, anxiety, or mood swings  HEME/LYMPH: No easy bruising, or bleeding gums  ALLERGY AND IMMUNOLOGIC: No hives or eczema    Vital Signs Last 24 Hrs  T(C): 36.8 (20 May 2019 13:48), Max: 36.9 (19 May 2019 23:30)  T(F): 98.2 (20 May 2019 13:48), Max: 98.5 (20 May 2019 10:35)  HR: 71 (20 May 2019 16:00) (62 - 72)  BP: 142/77 (20 May 2019 16:00) (123/50 - 175/62)  BP(mean): --  RR: 18 (20 May 2019 16:00) (12 - 18)  SpO2: 98% (20 May 2019 16:00) (93% - 100%)    PHYSICAL EXAM:  GENERAL: NAD, well-groomed, well-developed  HEAD:  Atraumatic, Normocephalic  EYES:  conjunctiva and sclera clear  ENMT: Moist mucous membranes  NECK: Supple, No JVD  NERVOUS SYSTEM:  Alert & Oriented X3, Good concentration; Bilateral LE mobile, sensation to light touch intact  CHEST/LUNG: Clear to auscultation bilaterally; No rales, rhonchi, wheezing, or rubs  HEART: Regular rate and rhythm; No murmurs, rubs, or gallops  ABDOMEN: Soft, Nontender, Nondistended; Bowel sounds present  EXTREMITIES:  2+ Peripheral Pulses, No clubbing or cyanosis  LYMPH: No lymphadenopathy noted  SKIN: No rashes or lesions  INCISION:  Dressing dry and intact    LABS:      Ca    8.7        19 May 2019 06:50      PT/INR - ( 19 May 2019 06:50 )   PT: 11.8 sec;   INR: 1.08 ratio         PTT - ( 19 May 2019 07:58 )  PTT:30.7 sec    CAPILLARY BLOOD GLUCOSE  POCT Blood Glucose.: 144 mg/dL (20 May 2019 14:28)  POCT Blood Glucose.: 167 mg/dL (20 May 2019 07:42)  POCT Blood Glucose.: 123 mg/dL (19 May 2019 21:37)  POCT Blood Glucose.: 119 mg/dL (19 May 2019 16:41)      RADIOLOGY & ADDITIONAL TESTS:    Imaging Personally Reviewed:      [ ] Consultant(s) Notes Reviewed  [x] Care Discussed with Consultants/Other Providers:  Ortho PA- plan of care
Patient is a 74y old  Female who presents with a chief complaint of staged TKR (19 May 2019 11:25)    INTERVAL HPI/OVERNIGHT EVENTS: feeling well, no complaints. pain controlled.    MEDICATIONS  (STANDING):  acetaminophen   Tablet .. 1000 milliGRAM(s) Oral every 8 hours  anastrozole 1 milliGRAM(s) Oral daily  apixaban 2.5 milliGRAM(s) Oral <User Schedule>  atorvastatin 10 milliGRAM(s) Oral at bedtime  carvedilol 6.25 milliGRAM(s) Oral every 12 hours  celecoxib 200 milliGRAM(s) Oral two times a day  chlorhexidine 2% Cloths 1 Application(s) Topical once  cloNIDine 0.2 milliGRAM(s) Oral two times a day  dextrose 5%. 1000 milliLiter(s) (50 mL/Hr) IV Continuous <Continuous>  dextrose 50% Injectable 12.5 Gram(s) IV Push once  dextrose 50% Injectable 25 Gram(s) IV Push once  dextrose 50% Injectable 25 Gram(s) IV Push once  docusate sodium 100 milliGRAM(s) Oral three times a day  insulin glargine Injectable (LANTUS) 50 Unit(s) SubCutaneous every morning  insulin lispro (HumaLOG) corrective regimen sliding scale   SubCutaneous three times a day before meals  insulin lispro (HumaLOG) corrective regimen sliding scale   SubCutaneous at bedtime  insulin lispro Injectable (HumaLOG) 10 Unit(s) SubCutaneous three times a day before meals  pantoprazole    Tablet 40 milliGRAM(s) Oral before breakfast  senna 2 Tablet(s) Oral at bedtime  tranexamic acid 4% Topical Irrigation 1 Application(s) IntraArticular once    MEDICATIONS  (PRN):  aluminum hydroxide/magnesium hydroxide/simethicone Suspension 30 milliLiter(s) Oral four times a day PRN Indigestion  bisacodyl 10 milliGRAM(s) Oral daily PRN Constipation  dextrose 40% Gel 15 Gram(s) Oral once PRN Blood Glucose LESS THAN 70 milliGRAM(s)/deciliter  glucagon  Injectable 1 milliGRAM(s) IntraMuscular once PRN Glucose LESS THAN 70 milligrams/deciliter  HYDROmorphone  Injectable 0.5 milliGRAM(s) IV Push every 3 hours PRN Severe Pain (7 - 10)  magnesium hydroxide Suspension 30 milliLiter(s) Oral daily PRN Constipation  ondansetron Injectable 4 milliGRAM(s) IV Push every 6 hours PRN Nausea and/or Vomiting  oxyCODONE    IR 5 milliGRAM(s) Oral every 3 hours PRN Mild Pain (1 - 3)  oxyCODONE    IR 10 milliGRAM(s) Oral every 3 hours PRN Moderate Pain (4 - 6)  polyethylene glycol 3350 17 Gram(s) Oral daily PRN Constipation      Allergies  No Known Drug Allergies  shellfish (Vomiting)    REVIEW OF SYSTEMS:  CONSTITUTIONAL: No fever, weight loss, or fatigue  EYES: No eye pain, visual disturbances, or discharge  ENMT:  No difficulty hearing, tinnitus, vertigo; No sinus or throat pain  NECK: No pain or stiffness  BREASTS: No pain, masses, or nipple discharge  RESPIRATORY: No cough, wheezing, chills or hemoptysis; No shortness of breath  CARDIOVASCULAR: No chest pain, palpitations, or lightheadedness  GASTROINTESTINAL: No abdominal or epigastric pain. No nausea, vomiting, or hematemesis; No diarrhea or constipation. No melena or hematochezia.  GENITOURINARY: No dysuria, frequency, hematuria, or incontinence  NEUROLOGICAL: No headaches, vertigo, memory loss, loss of strength, numbness, or tremors  SKIN: No itching, burning, rashes, or lesions   LYMPH NODES: No enlarged glands  ENDOCRINE: No heat or cold intolerance; No hair loss; No polydipsia or polyuria  MUSCULOSKELETAL: No back pain  PSYCHIATRIC: No depression, anxiety, or mood swings  HEME/LYMPH: No easy bruising, or bleeding gums  ALLERGY AND IMMUNOLOGIC: No hives or eczema    Vital Signs Last 24 Hrs  T(C): 36.8 (21 May 2019 15:00), Max: 37.2 (20 May 2019 19:00)  T(F): 98.2 (21 May 2019 15:00), Max: 99 (20 May 2019 19:00)  HR: 91 (21 May 2019 15:00) (80 - 93)  BP: 164/81 (21 May 2019 15:00) (133/70 - 197/78)  BP(mean): --  RR: 16 (21 May 2019 15:00) (16 - 18)  SpO2: 92% (21 May 2019 15:00) (92% - 95%)    PHYSICAL EXAM:  GENERAL: NAD, well-groomed, well-developed  HEAD:  Atraumatic, Normocephalic  EYES:  conjunctiva and sclera clear  ENMT: Moist mucous membranes  NECK: Supple, No JVD, Normal thyroid  NERVOUS SYSTEM:  Alert & Oriented X3, Good concentration; Bilateral LE mobile, sensation to light touch intact  CHEST/LUNG: Clear to auscultation bilaterally; No rales, rhonchi, wheezing, or rubs  HEART: Regular rate and rhythm; No murmurs, rubs, or gallops  ABDOMEN: Soft, Nontender, Nondistended; Bowel sounds present  EXTREMITIES:  2+ Peripheral Pulses, No clubbing or cyanosis  LYMPH: No lymphadenopathy noted  SKIN: No rashes or lesions  INCISION:  Dressing dry and intact    LABS:                        10.1   10.47 )-----------( 232      ( 21 May 2019 07:32 )             29.6     21 May 2019 07:32    143    |  102    |  14     ----------------------------<  181    4.0     |  33     |  0.86     Ca    8.8        21 May 2019 07:32          CAPILLARY BLOOD GLUCOSE  POCT Blood Glucose.: 160 mg/dL (21 May 2019 16:32)  POCT Blood Glucose.: 244 mg/dL (21 May 2019 13:26)  POCT Blood Glucose.: 219 mg/dL (21 May 2019 11:59)  POCT Blood Glucose.: 200 mg/dL (21 May 2019 07:35)  POCT Blood Glucose.: 217 mg/dL (21 May 2019 03:17)  POCT Blood Glucose.: 276 mg/dL (21 May 2019 01:15)  POCT Blood Glucose.: 271 mg/dL (20 May 2019 22:25)      RADIOLOGY & ADDITIONAL TESTS:    Imaging Personally Reviewed:      [ ] Consultant(s) Notes Reviewed  [x] Care Discussed with Consultants/Other Providers:  Ortho PA- plan of care
SUBJECTIVE: Patient seen and examined. No nausea/vomiting, nor shortness of breath. Patient is very sleepy and responding to questions appropriately    OBJECTIVE:     Vital Signs Last 24 Hrs  T(C): 36.8 (22 May 2019 03:30), Max: 36.8 (21 May 2019 11:22)  T(F): 98.2 (22 May 2019 03:30), Max: 98.2 (21 May 2019 11:22)  HR: 98 (22 May 2019 06:15) (75 - 98)  BP: 122/76 (22 May 2019 06:15) (122/76 - 173/84)  BP(mean): --  RR: 16 (22 May 2019 03:30) (16 - 18)  SpO2: 96% (22 May 2019 03:30) (91% - 96%)    PAIN SCORE:     2    SCALE USED: (1-10 VNRS)        Affected extremity:          Dressing: clean/dry/intact post operative dressing. Surgical incision with prineo intact, no erythema           Sensation:  intact to light touch         Motor exam:          5/ 5 Tibialis Anterior/Gastrocnemius-Soleus , EHL         warm well perfused; capillary refill <3 seconds     LABS:                        8.9    8.25  )-----------( 180      ( 22 May 2019 07:14 )             26.8     05-21    143  |  102  |  14  ----------------------------<  181<H>  4.0   |  33<H>  |  0.86    Ca    8.8      21 May 2019 07:32        CAPILLARY BLOOD GLUCOSE      POCT Blood Glucose.: 111 mg/dL (21 May 2019 21:46)      MEDICATIONS:    Anticoagulation:  apixaban 2.5 milliGRAM(s) Oral every 12 hours  tranexamic acid 4% Topical Irrigation 1 Application(s) IntraArticular once      Antibiotics:       Pain medications:   acetaminophen   Tablet .. 1000 milliGRAM(s) Oral every 8 hours  celecoxib 200 milliGRAM(s) Oral two times a day  HYDROmorphone  Injectable 0.5 milliGRAM(s) IV Push every 3 hours PRN  ondansetron Injectable 4 milliGRAM(s) IV Push every 6 hours PRN  oxyCODONE    IR 5 milliGRAM(s) Oral every 3 hours PRN  oxyCODONE    IR 10 milliGRAM(s) Oral every 3 hours PRN      A/P :  s/p Staged bilateral TKR   -    Pain control  -    DVT ppx: eliquis  -    Check AM labs  -    Weight bearing status: WBAT   -    Physical Therapy  -    Dispo: [facility]
Patient is a 74y old  Female who presents with a chief complaint of     INTERVAL HPI/OVERNIGHT EVENTS: patient concerned about her hyperglycemia but pain is controlled and otherwise feeling well.     MEDICATIONS  (STANDING):  acetaminophen   Tablet .. 1000 milliGRAM(s) Oral every 8 hours  anastrozole 1 milliGRAM(s) Oral daily  atorvastatin 10 milliGRAM(s) Oral at bedtime  carvedilol 6.25 milliGRAM(s) Oral every 12 hours  celecoxib 200 milliGRAM(s) Oral two times a day  cloNIDine 0.2 milliGRAM(s) Oral two times a day  dextrose 5%. 1000 milliLiter(s) (50 mL/Hr) IV Continuous <Continuous>  dextrose 5%. 1000 milliLiter(s) (50 mL/Hr) IV Continuous <Continuous>  dextrose 50% Injectable 12.5 Gram(s) IV Push once  dextrose 50% Injectable 25 Gram(s) IV Push once  dextrose 50% Injectable 25 Gram(s) IV Push once  dextrose 50% Injectable 12.5 Gram(s) IV Push once  dextrose 50% Injectable 25 Gram(s) IV Push once  dextrose 50% Injectable 25 Gram(s) IV Push once  docusate sodium 100 milliGRAM(s) Oral three times a day  enoxaparin Injectable 30 milliGRAM(s) SubCutaneous every 12 hours  insulin glargine Injectable (LANTUS) 50 Unit(s) SubCutaneous every morning  insulin lispro (HumaLOG) corrective regimen sliding scale   SubCutaneous three times a day before meals  insulin lispro (HumaLOG) corrective regimen sliding scale   SubCutaneous at bedtime  insulin lispro Injectable (HumaLOG) 10 Unit(s) SubCutaneous three times a day before meals  lactated ringers. 1000 milliLiter(s) (75 mL/Hr) IV Continuous <Continuous>  lactated ringers. 1000 milliLiter(s) (125 mL/Hr) IV Continuous <Continuous>  pantoprazole    Tablet 40 milliGRAM(s) Oral before breakfast  senna 2 Tablet(s) Oral at bedtime    MEDICATIONS  (PRN):  aluminum hydroxide/magnesium hydroxide/simethicone Suspension 30 milliLiter(s) Oral four times a day PRN Indigestion  dextrose 40% Gel 15 Gram(s) Oral once PRN Blood Glucose LESS THAN 70 milliGRAM(s)/deciliter  dextrose 40% Gel 15 Gram(s) Oral once PRN Blood Glucose LESS THAN 70 milliGRAM(s)/deciLiter  glucagon  Injectable 1 milliGRAM(s) IntraMuscular once PRN Glucose <70 milliGRAM(s)/deciLiter  glucagon  Injectable 1 milliGRAM(s) IntraMuscular once PRN Glucose LESS THAN 70 milligrams/deciliter  HYDROmorphone  Injectable 0.5 milliGRAM(s) IV Push every 3 hours PRN Severe Pain (7 - 10)  magnesium hydroxide Suspension 30 milliLiter(s) Oral daily PRN Constipation  oxyCODONE    IR 5 milliGRAM(s) Oral every 3 hours PRN Mild Pain (1 - 3)  oxyCODONE    IR 10 milliGRAM(s) Oral every 3 hours PRN Moderate Pain (4 - 6)  polyethylene glycol 3350 17 Gram(s) Oral daily PRN Constipation      Allergies  No Known Drug Allergies  shellfish (Vomiting)    REVIEW OF SYSTEMS:  CONSTITUTIONAL: No fever, weight loss, or fatigue  EYES: No eye pain, visual disturbances, or discharge  ENMT:  No difficulty hearing, tinnitus, vertigo; No sinus or throat pain  NECK: No pain or stiffness  BREASTS: No pain, masses, or nipple discharge  RESPIRATORY: No cough, wheezing, chills or hemoptysis; No shortness of breath  CARDIOVASCULAR: No chest pain, palpitations, or lightheadedness  GASTROINTESTINAL: No abdominal or epigastric pain. No nausea, vomiting, or hematemesis; No diarrhea or constipation. No melena or hematochezia.  GENITOURINARY: No dysuria, frequency, hematuria, or incontinence  NEUROLOGICAL: No headaches, vertigo, memory loss, loss of strength, numbness, or tremors  SKIN: No itching, burning, rashes, or lesions   LYMPH NODES: No enlarged glands  ENDOCRINE: No heat or cold intolerance; No hair loss; No polydipsia or polyuria  MUSCULOSKELETAL: No back pain  PSYCHIATRIC: No depression, anxiety, or mood swings  HEME/LYMPH: No easy bruising, or bleeding gums  ALLERGY AND IMMUNOLOGIC: No hives or eczema    Vital Signs Last 24 Hrs  T(C): 36.6 (16 May 2019 11:33), Max: 36.7 (16 May 2019 06:21)  T(F): 97.8 (16 May 2019 11:33), Max: 98 (16 May 2019 06:21)  HR: 67 (16 May 2019 11:33) (65 - 96)  BP: 143/79 (16 May 2019 11:33) (125/73 - 180/67)  BP(mean): --  RR: 18 (16 May 2019 11:33) (15 - 18)  SpO2: 97% (16 May 2019 11:33) (90% - 100%)    PHYSICAL EXAM:  GENERAL: NAD, well-groomed, well-developed  HEAD:  Atraumatic, Normocephalic  EYES: EOMI, PERRLA, conjunctiva and sclera clear  ENMT: Moist mucous membranes  NECK: Supple, No JVD  NERVOUS SYSTEM:  Alert & Oriented X3, Good concentration; Bilateral LE mobile, sensation to light touch intact  CHEST/LUNG: Clear to auscultation bilaterally; No rales, rhonchi, wheezing, or rubs  HEART: Regular rate and rhythm; No murmurs, rubs, or gallops  ABDOMEN: Soft, Nontender, Nondistended; Bowel sounds present  EXTREMITIES:  2+ Peripheral Pulses, No clubbing or cyanosis  LYMPH: No lymphadenopathy noted  SKIN: No rashes or lesions  INCISION:  Dressing dry and intact    LABS:                        12.5   12.74 )-----------( 179      ( 16 May 2019 07:11 )             35.6     16 May 2019 07:11    145    |  104    |  14     ----------------------------<  198    3.5     |  29     |  0.96     Ca    9.1        16 May 2019 07:11      PTT - ( 15 May 2019 09:08 )  PTT:24.8 sec    CAPILLARY BLOOD GLUCOSE  POCT Blood Glucose.: 260 mg/dL (16 May 2019 12:05)  POCT Blood Glucose.: 178 mg/dL (16 May 2019 07:42)  POCT Blood Glucose.: 219 mg/dL (16 May 2019 02:57)  POCT Blood Glucose.: 342 mg/dL (15 May 2019 20:59)  POCT Blood Glucose.: 229 mg/dL (15 May 2019 18:04)  POCT Blood Glucose.: 155 mg/dL (15 May 2019 16:41)      RADIOLOGY & ADDITIONAL TESTS:    Imaging Personally Reviewed:      [ ] Consultant(s) Notes Reviewed  [x] Care Discussed with Consultants/Other Providers:  Ortho PA- plan of care

## 2019-05-22 NOTE — PROGRESS NOTE ADULT - PROBLEM SELECTOR PROBLEM 2
Diabetes mellitus, type 2

## 2019-05-22 NOTE — PROGRESS NOTE ADULT - PROVIDER SPECIALTY LIST ADULT
Hospitalist
Orthopedics
Pharmacy
Pharmacy
Hospitalist

## 2019-05-22 NOTE — PROGRESS NOTE ADULT - PROBLEM SELECTOR PLAN 3
coneinue meds with hold parameters
Blood pressure meds with hold parameters
Blood pressure meds with hold parameters
coneinue meds with hold parameters
continue meds with hold parameters

## 2019-05-22 NOTE — PROGRESS NOTE ADULT - PROBLEM SELECTOR PROBLEM 1
Primary osteoarthritis of both knees

## 2019-05-22 NOTE — PROGRESS NOTE ADULT - PROBLEM SELECTOR PLAN 2
with hyperglycemia.   will increase pre-meal humalog to 10 units TID  lantus 50 qam  continue moderate dose coverage scale.   DM education
improving hyperglycemia   pre-meal humalog at 6 units TID while on full liquid  lantus 50 qam  continue moderate dose coverage scale.   DM education
improving hyperglycemia  continue pre-meal humalog  to 10 units QAC  lantus 50 qam  continue moderate dose coverage scale.   DM education
improving hyperglycemia  increase pre-meal humalog  to 10 units QAC  lantus 50 qam  continue moderate dose coverage scale.   DM education
with hyperglycemia - now improving  continue pre-meal humalog at 10 units TID  lantus 50 qam  continue moderate dose coverage scale.   DM education
with hyperglycemia - now improving  continue pre-meal humalog at 10 units TID  lantus 50 qam  continue moderate dose coverage scale.   titrate prn
with hyperglycemia - now improving  continue pre-meal humalog at 10 units TID  lower lantus dose in AM  continue moderate dose coverage scale.   titrate prn

## 2019-05-22 NOTE — PROGRESS NOTE ADULT - PROBLEM SELECTOR PLAN 1
Pain Management: acceptable- continue current care Tylenol ATC/Celebrex ATC/ Oxycodone PRN  Continue PT/OT  DVT proph: [  ] low risk - Aspirin  [  ] high risk -Lovenox [  x] high risk - Eliquis [  ] other:_________  DC plan:  [  ] Home with HC  [ x ] Rehab   [ ] TBD  [  ]other:___________
Pain Management: acceptable- continue current care Tylenol ATC/Celebrex ATC/ Oxycodone PRN  Continue PT/OT  DVT proph: [  ] low risk - Aspirin  [  ] high risk -Lovenox [  x] high risk - Eliquis [  ] other:_________  DC plan:  [  ] Home with HC  [ x ] Rehab -probably tomorrow if cleared.
Pain Management: acceptable- continue current care Tylenol ATC/Celebrex ATC/ Oxycodone PRN  Continue PT/OT  DVT proph: [  ] low risk - Aspirin  [  ] high risk -Lovenox [  x] high risk - Eliquis [  ] other:_________  DC plan:  [  ] Home with HC  [ x ] Rehab -today
Pain Management: acceptable- continue current care Tylenol ATC/Celebrex ATC/ Oxycodone PRN  Continue PT/OT  DVT proph: [  ] low risk - Aspirin  [ x ] high risk -Lovenox [  ] high risk - Eliquis [  ] other:_________  DC plan:  [  ] Home with HC  [  ] Rehab   [x ] TBD  [  ]other:___________
Pain Management: acceptable- continue current care Tylenol ATC/Celebrex ATC/ Oxycodone PRN - lower dose  Continue PT/OT  DVT proph: [  ] low risk - Aspirin  [ x ] high risk -Lovenox [  ] high risk - Eliquis [  ] other:_________  DC plan:  [  ] Home with HC  [  ] Rehab   [x ] TBD  [  ]other:___________
Pain Management: acceptable- continue current care Tylenol ATC/Celebrex ATC/ Oxycodone PRN - lower dose  Continue PT/OT  stage 2 tomorrow  DVT proph: [  ] low risk - Aspirin  [ x ] high risk -Lovenox [  ] high risk - Eliquis [  ] other:_________  DC plan:  [  ] Home with HC  [  ] Rehab   [x ] TBD  [  ]other:___________
Pain Management: acceptable- continue current care Tylenol ATC/Celebrex ATC/ Oxycodone PRN  Continue PT/OT  DVT proph: [  ] low risk - Aspirin  [ x ] high risk -Lovenox [  ] high risk - Eliquis [  ] other:_________  DC plan:  [  ] Home with HC  [  ] Rehab   [x ] TBD  [  ]other:___________

## 2019-05-22 NOTE — PROGRESS NOTE ADULT - PROBLEM SELECTOR PLAN 4
continue statin.

## 2019-05-22 NOTE — DISCHARGE NOTE NURSING/CASE MANAGEMENT/SOCIAL WORK - NSDCDPATPORTLINK_GEN_ALL_CORE
You can access the [facility] Patient Portal, offered by [facility], by registering with the following website: [email or web address]

## 2019-05-22 NOTE — PROGRESS NOTE ADULT - ASSESSMENT
74F s/p TKR - left and right.
74F s/p TKR - left; stage 1
Presurgical evaluation:  1.	Low aPTT – to be addressed either in clearance(s) or preop  2.	IV vs Topical TXA, per surgeon and anesthesia  3.	Acetaminophen and Celecoxib for multimodal pain management  4.	Patient to consult with oncologist, re: interruption or continuation of anastrozole perioperatively  5.	Mia Diliberto for postop glycemic control. Lantus® is formulary basal insulin in place of Tresiba®  6.	Nutrition copied for dietary intolerance  7.	Shawni 17 after side 1 only; Dr. Hidalgo to advise for any alteration in DVT prophylaxis and plan for side 2 surgery. If side 2 is done, Caprini will be 22.
74F s/p TKR - left; stage 1

## 2019-05-22 NOTE — DISCHARGE NOTE PROVIDER - CARE PROVIDER_API CALL
Arturo Hidalgo)  Orthopaedic Surgery  [address], [address]  [place] [address]  Phone: [phone]  Fax: [phone]  Follow Up Time:

## 2019-06-07 ENCOUNTER — OUTPATIENT (OUTPATIENT)
Dept: OUTPATIENT SERVICES | Facility: HOSPITAL | Age: 74
LOS: 1 days | Discharge: HOME | End: 2019-06-07

## 2019-06-07 DIAGNOSIS — Z90.2 ACQUIRED ABSENCE OF LUNG [PART OF]: Chronic | ICD-10-CM

## 2019-06-07 DIAGNOSIS — C50.919 MALIGNANT NEOPLASM OF UNSPECIFIED SITE OF UNSPECIFIED FEMALE BREAST: ICD-10-CM

## 2019-06-11 ENCOUNTER — APPOINTMENT (OUTPATIENT)
Dept: HEMATOLOGY ONCOLOGY | Facility: CLINIC | Age: 74
End: 2019-06-11

## 2019-06-17 ENCOUNTER — RX RENEWAL (OUTPATIENT)
Age: 74
End: 2019-06-17

## 2019-06-17 ENCOUNTER — APPOINTMENT (OUTPATIENT)
Dept: ORTHOPEDIC SURGERY | Facility: CLINIC | Age: 74
End: 2019-06-17
Payer: MEDICARE

## 2019-06-17 VITALS — HEART RATE: 68 BPM | DIASTOLIC BLOOD PRESSURE: 80 MMHG | SYSTOLIC BLOOD PRESSURE: 109 MMHG

## 2019-06-17 PROCEDURE — 99024 POSTOP FOLLOW-UP VISIT: CPT

## 2019-06-17 PROCEDURE — 73562 X-RAY EXAM OF KNEE 3: CPT | Mod: 50

## 2019-07-16 ENCOUNTER — APPOINTMENT (OUTPATIENT)
Dept: ORTHOPEDIC SURGERY | Facility: CLINIC | Age: 74
End: 2019-07-16
Payer: MEDICARE

## 2019-07-16 VITALS
WEIGHT: 182 LBS | BODY MASS INDEX: 34.36 KG/M2 | SYSTOLIC BLOOD PRESSURE: 114 MMHG | DIASTOLIC BLOOD PRESSURE: 65 MMHG | HEIGHT: 61 IN | HEART RATE: 55 BPM

## 2019-07-16 PROCEDURE — 73562 X-RAY EXAM OF KNEE 3: CPT | Mod: 50

## 2019-07-16 PROCEDURE — 99024 POSTOP FOLLOW-UP VISIT: CPT

## 2019-08-29 ENCOUNTER — OUTPATIENT (OUTPATIENT)
Dept: OUTPATIENT SERVICES | Facility: HOSPITAL | Age: 74
LOS: 1 days | Discharge: HOME | End: 2019-08-29

## 2019-08-29 DIAGNOSIS — Z90.2 ACQUIRED ABSENCE OF LUNG [PART OF]: Chronic | ICD-10-CM

## 2019-08-29 DIAGNOSIS — C50.919 MALIGNANT NEOPLASM OF UNSPECIFIED SITE OF UNSPECIFIED FEMALE BREAST: ICD-10-CM

## 2019-09-03 ENCOUNTER — APPOINTMENT (OUTPATIENT)
Dept: HEMATOLOGY ONCOLOGY | Facility: CLINIC | Age: 74
End: 2019-09-03
Payer: MEDICARE

## 2019-09-03 ENCOUNTER — RESULT REVIEW (OUTPATIENT)
Age: 74
End: 2019-09-03

## 2019-09-03 VITALS
WEIGHT: 173.06 LBS | SYSTOLIC BLOOD PRESSURE: 165 MMHG | RESPIRATION RATE: 16 BRPM | DIASTOLIC BLOOD PRESSURE: 74 MMHG | TEMPERATURE: 207.5 F | HEART RATE: 62 BPM | BODY MASS INDEX: 32.7 KG/M2 | OXYGEN SATURATION: 98 %

## 2019-09-03 DIAGNOSIS — Z13.79 ENCOUNTER FOR OTHER SCREENING FOR GENETIC AND CHROMOSOMAL ANOMALIES: ICD-10-CM

## 2019-09-03 DIAGNOSIS — Z13.71 ENCOUNTER FOR NONPROCREATIVE SCREENING FOR GENETIC DISEASE CARRIER STATUS: ICD-10-CM

## 2019-09-03 DIAGNOSIS — R53.83 OTHER FATIGUE: ICD-10-CM

## 2019-09-03 DIAGNOSIS — E07.9 DISORDER OF THYROID, UNSPECIFIED: ICD-10-CM

## 2019-09-03 DIAGNOSIS — I25.10 ATHEROSCLEROTIC HEART DISEASE OF NATIVE CORONARY ARTERY W/OUT ANGINA PECTORIS: ICD-10-CM

## 2019-09-03 DIAGNOSIS — I25.84 ATHEROSCLEROTIC HEART DISEASE OF NATIVE CORONARY ARTERY W/OUT ANGINA PECTORIS: ICD-10-CM

## 2019-09-03 LAB
HCT VFR BLD CALC: 38.4 % — SIGNIFICANT CHANGE UP (ref 34.5–45)
HGB BLD-MCNC: 12.9 G/DL — SIGNIFICANT CHANGE UP (ref 11.5–15.5)
MCHC RBC-ENTMCNC: 26.3 PG — LOW (ref 27–34)
MCHC RBC-ENTMCNC: 33.6 G/DL — SIGNIFICANT CHANGE UP (ref 32–36)
MCV RBC AUTO: 78.3 FL — LOW (ref 80–100)
PLATELET # BLD AUTO: 198 K/UL — SIGNIFICANT CHANGE UP (ref 150–400)
RBC # BLD: 4.9 M/UL — SIGNIFICANT CHANGE UP (ref 3.8–5.2)
RBC # FLD: 12.7 % — SIGNIFICANT CHANGE UP (ref 10.3–14.5)
WBC # BLD: 5.8 K/UL — SIGNIFICANT CHANGE UP (ref 3.8–10.5)
WBC # FLD AUTO: 5.8 K/UL — SIGNIFICANT CHANGE UP (ref 3.8–10.5)

## 2019-09-03 PROCEDURE — 99215 OFFICE O/P EST HI 40 MIN: CPT

## 2019-09-03 RX ORDER — TRAMADOL HYDROCHLORIDE 50 MG/1
50 TABLET, COATED ORAL
Qty: 80 | Refills: 0 | Status: DISCONTINUED | COMMUNITY
Start: 2019-06-17 | End: 2019-09-03

## 2019-09-03 RX ORDER — INSULIN LISPRO 100 [IU]/ML
100 INJECTION, SOLUTION INTRAVENOUS; SUBCUTANEOUS
Refills: 0 | Status: ACTIVE | COMMUNITY
Start: 2018-08-28

## 2019-09-03 RX ORDER — GLUCOSAMINE SULFATE 500 MG
500 CAPSULE ORAL
Refills: 0 | Status: DISCONTINUED | COMMUNITY
Start: 2018-08-28 | End: 2019-09-03

## 2019-09-03 RX ORDER — ASPIRIN ENTERIC COATED TABLETS 81 MG 81 MG/1
81 TABLET, DELAYED RELEASE ORAL
Refills: 0 | Status: ACTIVE | COMMUNITY
Start: 2019-09-03

## 2019-09-03 NOTE — REVIEW OF SYSTEMS
[SOB on Exertion] : shortness of breath during exertion [Depression] : depression [Negative] : Allergic/Immunologic [FreeTextEntry2] : Energy improving, less than normal. Declined flu vaccination  [FreeTextEntry5] : See interval history [FreeTextEntry8] : Saw GYN Dunetz last week.  No vaginal bleeding or spotting. Was told her uterine lining thickening for 18 years. [FreeTextEntry7] : Most recent colonoscopy approximately 2015.Has colonoscopy scheduled for 9/25/2019. [de-identified] : Depression while on oxycodone, improved on Lexapro 10 g per day. [FreeTextEntry9] : Most recent BMD 5/3/2018.  int

## 2019-09-03 NOTE — HISTORY OF PRESENT ILLNESS
[Disease: _____________________] : Disease: [unfilled] [T: ___] : T[unfilled] [N: ___] : N[unfilled] [M: ___] : M[unfilled] [AJCC Stage: ____] : AJCC Stage: [unfilled] [de-identified] : The patient presented in 2018, at age 73, with an abnormal screening mammogram.\par \par Screening mammogram performed on 2018 demonstrated a lobulated asymmetric density in the medial right breast. Diagnostic right tomosynthesis mammogram on the same day demonstrated a 7 mm lobulated slightly irregular nodule in the medial right breast,at approximately 3:00. It was not seen on ultrasound. Stereotactic core biopsy was performed on 2018 and demonstrated well to moderately differentiated ductal infiltrating ductal carcinoma which was ER positive (%), ND positive (%) HER-2/tabitha negative (zero) and Ki-67 15%. 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 The infiltrating component measured 0.35 cm.\par \par Dr. Mindy Lowery performed a lumpectomy and sentinel lymph node biopsy on 2018. Pathology demonstrated no residual invasive carcinoma. There was DCIS, intermediate nuclear grade with necrosis and microcalcifications within the specimen measuring 0.6 cm and present in 2 of 14 blocks. The surgical margins were negative (>5 mm). There were 3 negative sentinel lymph nodes \par \par The patient has had an uneventful postoperative course.\par \par Risk factors:\par Prior breast disease: 2 previous biopsies, one demonstrating a papilloma. Menarche: Age 10. Menopause: Age 41. The patient is  5 para  with her first childbirth at age 26. She did not breast-feed her children. Oral contraceptive use: None. Hormone replacement therapy: None. Other hormone exposure: None. Topical estrogen: None. Family history is positive for a mother who had breast cancer in her late 70s/early 80s, a sister who had breast cancer at age 67, and a maternal first cousin who had breast cancer in her 50s. The patient herself has had adenocarcinoma of the lung and basal cell carcinomas. The patient denies any other family history of cancer or colorectal neoplasia. She is of [place] (Citizen of [place]  [employer] background.\par [facility] Multi-Cancer Panel (83 genes) drawn on 2019 was negative for pathogenic variants and for variants of uncertain significance.\par  [de-identified] : Well to moderately differentiated infiltrating ductal carcinoma ER positive, OH positive, HER-2/tabitha negative [de-identified] : Has been on anastrozole 7 months, tolerating well.\par \par Last saw breast surgeon Dr Mindy Lowery 8/2019. \par \par Most recent bilateral mammogram/breast US 8/2019. was told to have follow up right mammogram/breast US in 2/2020. I will request report\par \par [facility] Multi-Cancer Panel (83 genes) drawn on 2/28/2019 by Leonor Justice negative for pathogenic variants and variants of uncertain significance.\par \par Had bilateral knee replacement with Dr Wilson at [facility] on 5/20/2019. Has post op problems with depression related to oxycodone, now taking Lexapro..\par \par Had chest [place] at [facility] to follow up post removal at lung nodule and was found to have calcification of right coronary artery. Plans to discuss with her cardiologist Dr Christian Bautista.\par \par Last saw endocrinologist Dr Shikha Bolivar 02/15/2019 .\par \par No other interval event since last seen. [FreeTextEntry1] : anastrozole start 2/14/2019

## 2019-09-03 NOTE — PHYSICAL EXAM
[Restricted in physically strenuous activity but ambulatory and able to carry out work of a light or sedentary nature] : Status 1- Restricted in physically strenuous activity but ambulatory and able to carry out work of a light or sedentary nature, e.g., light house work, office work [Obese] : obese [Normal] : affect appropriate [de-identified] : 3/6 KIRTI UZAIRB [de-identified] : Right breast: healed incision UOQ. Healed incision UIQ with underlying hematoma. Healed incision right axilla with small seroma. Left breast: no mass. A-T Advancement Flap Text: The defect edges were debeveled with a #15 scalpel blade.  Given the location of the defect, shape of the defect and the proximity to free margins an A-T advancement flap was deemed most appropriate.  Using a sterile surgical marker, an appropriate advancement flap was drawn incorporating the defect and placing the expected incisions within the relaxed skin tension lines where possible.    The area thus outlined was incised deep to adipose tissue with a #15 scalpel blade.  The skin margins were undermined to an appropriate distance in all directions utilizing iris scissors.

## 2019-09-05 LAB
ALBUMIN SERPL ELPH-MCNC: 4.3 G/DL
ALP BLD-CCNC: 93 U/L
ALT SERPL-CCNC: 15 U/L
ANION GAP SERPL CALC-SCNC: 15 MMOL/L
AST SERPL-CCNC: 19 U/L
BILIRUB SERPL-MCNC: 0.9 MG/DL
BUN SERPL-MCNC: 10 MG/DL
CALCIUM SERPL-MCNC: 9.3 MG/DL
CHLORIDE SERPL-SCNC: 96 MMOL/L
CO2 SERPL-SCNC: 30 MMOL/L
CREAT SERPL-MCNC: 0.72 MG/DL
ESTIMATED AVERAGE GLUCOSE: 143 MG/DL
GLUCOSE SERPL-MCNC: 134 MG/DL
HBA1C MFR BLD HPLC: 6.6 %
POTASSIUM SERPL-SCNC: 4.1 MMOL/L
PROT SERPL-MCNC: 6.7 G/DL
SODIUM SERPL-SCNC: 141 MMOL/L
TSH SERPL-ACNC: 1.05 UIU/ML
VIT B12 SERPL-MCNC: 635 PG/ML

## 2019-09-10 ENCOUNTER — APPOINTMENT (OUTPATIENT)
Dept: ORTHOPEDIC SURGERY | Facility: CLINIC | Age: 74
End: 2019-09-10
Payer: MEDICARE

## 2019-09-10 VITALS — HEIGHT: 61 IN | BODY MASS INDEX: 32.66 KG/M2 | WEIGHT: 173 LBS

## 2019-09-10 PROCEDURE — 99213 OFFICE O/P EST LOW 20 MIN: CPT

## 2019-09-10 PROCEDURE — 73562 X-RAY EXAM OF KNEE 3: CPT | Mod: 50

## 2019-11-11 ENCOUNTER — TRANSCRIPTION ENCOUNTER (OUTPATIENT)
Age: 74
End: 2019-11-11

## 2019-12-12 ENCOUNTER — RX RENEWAL (OUTPATIENT)
Age: 74
End: 2019-12-12

## 2019-12-20 ENCOUNTER — APPOINTMENT (OUTPATIENT)
Dept: CARDIOLOGY | Facility: CLINIC | Age: 74
End: 2019-12-20
Payer: MEDICARE

## 2019-12-20 VITALS
DIASTOLIC BLOOD PRESSURE: 76 MMHG | HEART RATE: 77 BPM | OXYGEN SATURATION: 96 % | HEIGHT: 61 IN | SYSTOLIC BLOOD PRESSURE: 169 MMHG

## 2019-12-20 DIAGNOSIS — I10 ESSENTIAL (PRIMARY) HYPERTENSION: ICD-10-CM

## 2019-12-20 DIAGNOSIS — E11.65 TYPE 2 DIABETES MELLITUS WITH HYPERGLYCEMIA: ICD-10-CM

## 2019-12-20 DIAGNOSIS — E78.5 HYPERLIPIDEMIA, UNSPECIFIED: ICD-10-CM

## 2019-12-20 DIAGNOSIS — Z87.891 PERSONAL HISTORY OF NICOTINE DEPENDENCE: ICD-10-CM

## 2019-12-20 DIAGNOSIS — I73.9 PERIPHERAL VASCULAR DISEASE, UNSPECIFIED: ICD-10-CM

## 2019-12-20 DIAGNOSIS — Z82.49 FAMILY HISTORY OF ISCHEMIC HEART DISEASE AND OTHER DISEASES OF THE CIRCULATORY SYSTEM: ICD-10-CM

## 2019-12-20 PROCEDURE — 99204 OFFICE O/P NEW MOD 45 MIN: CPT

## 2019-12-20 RX ORDER — CELECOXIB 200 MG/1
200 CAPSULE ORAL TWICE DAILY
Qty: 60 | Refills: 0 | Status: DISCONTINUED | COMMUNITY
Start: 2019-07-16 | End: 2019-12-20

## 2019-12-20 RX ORDER — ATORVASTATIN CALCIUM 20 MG/1
20 TABLET, FILM COATED ORAL
Refills: 0 | Status: ACTIVE | COMMUNITY

## 2019-12-20 RX ORDER — LOSARTAN POTASSIUM AND HYDROCHLOROTHIAZIDE 100; 25 MG/1; MG/1
100-25 TABLET, FILM COATED ORAL
Refills: 0 | Status: ACTIVE | COMMUNITY

## 2019-12-20 RX ORDER — AMOXICILLIN 500 MG/1
500 CAPSULE ORAL
Qty: 20 | Refills: 4 | Status: DISCONTINUED | COMMUNITY
Start: 2019-06-17 | End: 2019-12-20

## 2019-12-20 RX ORDER — VALSARTAN AND HYDROCHLOROTHIAZIDE 160; 25 MG/1; MG/1
160-25 TABLET, FILM COATED ORAL
Qty: 90 | Refills: 0 | Status: DISCONTINUED | COMMUNITY
Start: 2017-06-23 | End: 2019-12-20

## 2019-12-20 RX ORDER — TERBINAFINE HCL 1 %
1 CREAM (GRAM) TOPICAL 3 TIMES DAILY
Qty: 1 | Refills: 0 | Status: ACTIVE | COMMUNITY
Start: 2019-12-20 | End: 1900-01-01

## 2019-12-20 RX ORDER — CELECOXIB 200 MG/1
200 CAPSULE ORAL TWICE DAILY
Qty: 60 | Refills: 0 | Status: DISCONTINUED | COMMUNITY
Start: 2019-09-10 | End: 2019-12-20

## 2019-12-20 NOTE — HISTORY OF PRESENT ILLNESS
[FreeTextEntry1] : 73 y/o F Ex-Smoker w/ PMHX HTN, HLD, DM, Breast CA treated by chemotherapy / lumpectomy, history of unknown lung condition in 2015 s/p surgical resection, history unknown EP procedure / ablation 8 yrs ago at [facility], s/p recent B/L TKR in summer 2019, who presents reports temporary bluish discoloration to B/L 5th digit toes. Occurring 4-5 days ago. Patient reports walking in the cold of winter with her sandals and no socks. When the feet are warmed no signs or symptoms are occurring. Also reports neuropathy, numbness/tingling in B/L feet.  Patient also noted to have tinea pedis. Denies LE claudication. No tissue loss noted. LE arterial doppler in 12/2019 showed b/l distal calf artery calcification, L PT > 50% stenosis, bilateral CFA to popliteal were unremarkable. \par \par Endocrine Shikha Simpson [phone]\par Cardiology Dr. Christian Naranjo [phone]

## 2019-12-20 NOTE — PHYSICAL EXAM
[General Appearance - Well Developed] : well developed [Normal Appearance] : normal appearance [No Deformities] : no deformities [General Appearance - Well Nourished] : well nourished [Well Groomed] : well groomed [Eyelids - No Xanthelasma] : the eyelids demonstrated no xanthelasmas [Normal Conjunctiva] : the conjunctiva exhibited no abnormalities [General Appearance - In No Acute Distress] : no acute distress [Normal Oral Mucosa] : normal oral mucosa [No Oral Cyanosis] : no oral cyanosis [No Oral Pallor] : no oral pallor [Normal Jugular Venous A Waves Present] : normal jugular venous A waves present [No Jugular Venous Jacobo A Waves] : no jugular venous jacobo A waves [Normal Jugular Venous V Waves Present] : normal jugular venous V waves present [Heart Rate And Rhythm] : heart rate and rhythm were normal [Murmurs] : no murmurs present [Heart Sounds] : normal S1 and S2 [Respiration, Rhythm And Depth] : normal respiratory rhythm and effort [Exaggerated Use Of Accessory Muscles For Inspiration] : no accessory muscle use [Auscultation Breath Sounds / Voice Sounds] : lungs were clear to auscultation bilaterally [Abdomen Soft] : soft [Abdomen Tenderness] : non-tender [Abnormal Walk] : normal gait [Abdomen Mass (___ Cm)] : no abdominal mass palpated [Gait - Sufficient For Exercise Testing] : the gait was sufficient for exercise testing [Nail Clubbing] : no clubbing of the fingernails [Petechial Hemorrhages (___cm)] : no petechial hemorrhages [Cyanosis, Localized] : no localized cyanosis [FreeTextEntry1] : fungal rash noted to bilateral feet. R DP and PT Palpable pulse. L DP and PT Doppler pulse [] : no ischemic changes [No Venous Stasis] : no venous stasis [Oriented To Time, Place, And Person] : oriented to person, place, and time [No Skin Ulcers] : no skin ulcer [Affect] : the affect was normal [No Anxiety] : not feeling anxious [Mood] : the mood was normal

## 2019-12-20 NOTE — DISCUSSION/SUMMARY
[FreeTextEntry1] : 1. Keep feet warm. Where socks and shoes.\par 2. JOHNNY and PVR ordered\par 3. Continue ASA and increased Statin dose\par 4. Start Lamisil topical BID to feet\par 5. Daily feet skin assessment. Keep feet moisturized and protected. \par 6. Follow-up after JOHNNY. \par 7. Continue current BP regimen. Monitor home BP twice daily and report resulting and follow-up with Primary Cardiologist. Notify MD if BP elevated. \par 8. Continue to follow with Endocrine

## 2019-12-20 NOTE — ASSESSMENT
[FreeTextEntry1] : 1. PAD involving L LE PT artery without LE claudication or LE ulcers\par 2. HTN\par 3. HLD -  on recent blood test, Lipitor increased to 20mg QHS, PMD attempting Crestor 20mg QHS\par 4. DM - HgbA1C less than 8\par 5. Reported symptoms of blue toes in cold suggestive of Pernio (vasospasm in cold temperatures)\par 6. Tinea Pedis\par \par \par

## 2020-03-14 ENCOUNTER — OUTPATIENT (OUTPATIENT)
Dept: OUTPATIENT SERVICES | Facility: HOSPITAL | Age: 75
LOS: 1 days | Discharge: HOME | End: 2020-03-14

## 2020-03-14 DIAGNOSIS — C50.919 MALIGNANT NEOPLASM OF UNSPECIFIED SITE OF UNSPECIFIED FEMALE BREAST: ICD-10-CM

## 2020-03-14 DIAGNOSIS — Z90.2 ACQUIRED ABSENCE OF LUNG [PART OF]: Chronic | ICD-10-CM

## 2020-03-19 ENCOUNTER — APPOINTMENT (OUTPATIENT)
Dept: HEMATOLOGY ONCOLOGY | Facility: CLINIC | Age: 75
End: 2020-03-19

## 2020-03-20 ENCOUNTER — APPOINTMENT (OUTPATIENT)
Dept: CARDIOLOGY | Facility: CLINIC | Age: 75
End: 2020-03-20

## 2020-05-12 ENCOUNTER — EMERGENCY (EMERGENCY)
Facility: HOSPITAL | Age: 75
LOS: 1 days | Discharge: HOME | End: 2020-05-12
Attending: STUDENT IN AN ORGANIZED HEALTH CARE EDUCATION/TRAINING PROGRAM
Payer: MEDICARE

## 2020-05-12 VITALS
RESPIRATION RATE: 20 BRPM | OXYGEN SATURATION: 100 % | DIASTOLIC BLOOD PRESSURE: 81 MMHG | HEART RATE: 59 BPM | TEMPERATURE: 98 F | HEIGHT: 62 IN | SYSTOLIC BLOOD PRESSURE: 126 MMHG | WEIGHT: 175.05 LBS

## 2020-05-12 VITALS
SYSTOLIC BLOOD PRESSURE: 146 MMHG | TEMPERATURE: 98 F | RESPIRATION RATE: 20 BRPM | OXYGEN SATURATION: 100 % | DIASTOLIC BLOOD PRESSURE: 80 MMHG | HEART RATE: 66 BPM

## 2020-05-12 DIAGNOSIS — Z90.2 ACQUIRED ABSENCE OF LUNG [PART OF]: Chronic | ICD-10-CM

## 2020-05-12 LAB
ALBUMIN SERPL ELPH-MCNC: 3.8 G/DL — SIGNIFICANT CHANGE UP (ref 3.3–5)
ALP SERPL-CCNC: 69 U/L — SIGNIFICANT CHANGE UP (ref 40–120)
ALT FLD-CCNC: 18 U/L — SIGNIFICANT CHANGE UP (ref 10–45)
ANION GAP SERPL CALC-SCNC: 14 MMOL/L — SIGNIFICANT CHANGE UP (ref 5–17)
AST SERPL-CCNC: 27 U/L — SIGNIFICANT CHANGE UP (ref 10–40)
BASOPHILS # BLD AUTO: 0.03 K/UL — SIGNIFICANT CHANGE UP (ref 0–0.2)
BASOPHILS NFR BLD AUTO: 0.4 % — SIGNIFICANT CHANGE UP (ref 0–2)
BILIRUB SERPL-MCNC: 1.3 MG/DL — HIGH (ref 0.2–1.2)
BUN SERPL-MCNC: 13 MG/DL — SIGNIFICANT CHANGE UP (ref 7–23)
CALCIUM SERPL-MCNC: 9 MG/DL — SIGNIFICANT CHANGE UP (ref 8.4–10.5)
CHLORIDE SERPL-SCNC: 94 MMOL/L — LOW (ref 96–108)
CO2 SERPL-SCNC: 28 MMOL/L — SIGNIFICANT CHANGE UP (ref 22–31)
CREAT SERPL-MCNC: 1.01 MG/DL — SIGNIFICANT CHANGE UP (ref 0.5–1.3)
EOSINOPHIL # BLD AUTO: 0.12 K/UL — SIGNIFICANT CHANGE UP (ref 0–0.5)
EOSINOPHIL NFR BLD AUTO: 1.5 % — SIGNIFICANT CHANGE UP (ref 0–6)
GLUCOSE SERPL-MCNC: 284 MG/DL — HIGH (ref 70–99)
HCT VFR BLD CALC: 40.4 % — SIGNIFICANT CHANGE UP (ref 34.5–45)
HGB BLD-MCNC: 13.3 G/DL — SIGNIFICANT CHANGE UP (ref 11.5–15.5)
IMM GRANULOCYTES NFR BLD AUTO: 0.5 % — SIGNIFICANT CHANGE UP (ref 0–1.5)
LYMPHOCYTES # BLD AUTO: 1.44 K/UL — SIGNIFICANT CHANGE UP (ref 1–3.3)
LYMPHOCYTES # BLD AUTO: 18.6 % — SIGNIFICANT CHANGE UP (ref 13–44)
MCHC RBC-ENTMCNC: 26.1 PG — LOW (ref 27–34)
MCHC RBC-ENTMCNC: 32.9 GM/DL — SIGNIFICANT CHANGE UP (ref 32–36)
MCV RBC AUTO: 79.4 FL — LOW (ref 80–100)
MONOCYTES # BLD AUTO: 0.53 K/UL — SIGNIFICANT CHANGE UP (ref 0–0.9)
MONOCYTES NFR BLD AUTO: 6.8 % — SIGNIFICANT CHANGE UP (ref 2–14)
NEUTROPHILS # BLD AUTO: 5.59 K/UL — SIGNIFICANT CHANGE UP (ref 1.8–7.4)
NEUTROPHILS NFR BLD AUTO: 72.2 % — SIGNIFICANT CHANGE UP (ref 43–77)
NRBC # BLD: 0 /100 WBCS — SIGNIFICANT CHANGE UP (ref 0–0)
PLATELET # BLD AUTO: 187 K/UL — SIGNIFICANT CHANGE UP (ref 150–400)
POTASSIUM SERPL-MCNC: 3.5 MMOL/L — SIGNIFICANT CHANGE UP (ref 3.5–5.3)
POTASSIUM SERPL-SCNC: 3.5 MMOL/L — SIGNIFICANT CHANGE UP (ref 3.5–5.3)
PROT SERPL-MCNC: 6.2 G/DL — SIGNIFICANT CHANGE UP (ref 6–8.3)
RBC # BLD: 5.09 M/UL — SIGNIFICANT CHANGE UP (ref 3.8–5.2)
RBC # FLD: 13.1 % — SIGNIFICANT CHANGE UP (ref 10.3–14.5)
SODIUM SERPL-SCNC: 136 MMOL/L — SIGNIFICANT CHANGE UP (ref 135–145)
TROPONIN T, HIGH SENSITIVITY RESULT: 19 NG/L — SIGNIFICANT CHANGE UP (ref 0–51)
TROPONIN T, HIGH SENSITIVITY RESULT: 25 NG/L — SIGNIFICANT CHANGE UP (ref 0–51)
WBC # BLD: 7.75 K/UL — SIGNIFICANT CHANGE UP (ref 3.8–10.5)
WBC # FLD AUTO: 7.75 K/UL — SIGNIFICANT CHANGE UP (ref 3.8–10.5)

## 2020-05-12 PROCEDURE — 80053 COMPREHEN METABOLIC PANEL: CPT

## 2020-05-12 PROCEDURE — 85027 COMPLETE CBC AUTOMATED: CPT

## 2020-05-12 PROCEDURE — 36430 TRANSFUSION BLD/BLD COMPNT: CPT

## 2020-05-12 PROCEDURE — 84484 ASSAY OF TROPONIN QUANT: CPT

## 2020-05-12 PROCEDURE — 99285 EMERGENCY DEPT VISIT HI MDM: CPT | Mod: 25

## 2020-05-12 PROCEDURE — 93010 ELECTROCARDIOGRAM REPORT: CPT

## 2020-05-12 PROCEDURE — 93005 ELECTROCARDIOGRAM TRACING: CPT

## 2020-05-12 PROCEDURE — 99285 EMERGENCY DEPT VISIT HI MDM: CPT

## 2020-05-12 PROCEDURE — 71045 X-RAY EXAM CHEST 1 VIEW: CPT | Mod: 26

## 2020-05-12 PROCEDURE — 71045 X-RAY EXAM CHEST 1 VIEW: CPT

## 2020-05-12 RX ORDER — SODIUM CHLORIDE 9 MG/ML
500 INJECTION INTRAMUSCULAR; INTRAVENOUS; SUBCUTANEOUS ONCE
Refills: 0 | Status: COMPLETED | OUTPATIENT
Start: 2020-05-12 | End: 2020-05-12

## 2020-05-12 RX ADMIN — SODIUM CHLORIDE 500 MILLILITER(S): 9 INJECTION INTRAMUSCULAR; INTRAVENOUS; SUBCUTANEOUS at 13:13

## 2020-05-12 NOTE — ED PROVIDER NOTE - PMH
Atrial Arrhythmia    Diabetes mellitus, type 2    Dyslipidemia    History of breast cancer  right, 2018 treated with radiation  Hypertension    Lung cancer  2015, right lobe. treated with lobectomy  Obesity (BMI 30-39.9)    Obesity (BMI 30.0-34.9)    Onychomycosis of toenail  1st right toe  Osteoarthritis of both knees    Pedal edema    Urinary Urgency

## 2020-05-12 NOTE — ED PROVIDER NOTE - PROGRESS NOTE DETAILS
Pt feeling well, no return of CP/SOB in ED. Discussed results with patient and advise PCP and cards f/u. Pt understands and reports she has cardiologist Dr. Oliver Naranjo. - Narda Sweeney PA-C . Pt feeling well, no return of CP/SOB in ED. Discussed results with patient and advised PCP and cards f/u. Pt understands and reports she has cardiologist Dr. Oliver Naranjo. - Narda Sweeney PA-C .

## 2020-05-12 NOTE — ED PROVIDER NOTE - NSFOLLOWUPINSTRUCTIONS_ED_ALL_ED_FT
Rest. Stay well hydrated.   Take all of your other medications as previously prescribed.   Follow up with your PMD and Cardiologist Dr. Naranjo within 48-72 hours.   Show copies of your reports given to you.    Return to ED for worsening or continued chest pain, shortness of breath, weakness, or any other concerning symptoms.

## 2020-05-12 NOTE — ED PROVIDER NOTE - ATTENDING CONTRIBUTION TO CARE
75 YOF PMH R breast CA s/p lumpectomy, breast CA, HTN, HLD, DM here for dizziness. Patient went to  today to get COVID antibody testing and was noted to be hypertensive there to SBP 200s, recommended come to ED but patient declined as she missed her morning HTN meds. Went home and took her HTN medications and noted BP still to be elevated so took extra clonidine 0.2mg making her feel dizzy. Patient attempted to drink salt water to improve symptoms giving her brief episode of chest pain lasting seconds. Here patient denies complaints. Denies f/c, n/v, cp, sob, abd pain, leg swelling.   AP - asymtpomatic here. ekg nonischemic. will check labs/trop. discussed plan for BP management f/u with PMD and instructed not to take extra doses. reassess

## 2020-05-12 NOTE — ED PROVIDER NOTE - OBJECTIVE STATEMENT
74yo F with PMH R breast CA s/p lumpectomy, breast CA, HTN, HLD, DMT2, presenting with chest pain at 12pm today. Pt woke up feeling well, went to  to get COVID Ab testing (no current symptoms), and noted to have elevated BP to 200 systolic. Pt admits to not taking her morning BP meds. Went home and took her normal AM meds including clonidine 0.3mg along with her carvedilol, proceeded to have breakfast and re-measured BP, saw diastolic still high and took additional clonidine 0.2mg. Reports she felt dizziness and nausea, then vomiting x 1. BP was then low, so drank salty water and that is when she felt brief substernal CP. CP was non-radiating, lasted several seconds and resolved on own. Patient currently asymptomatic. Reports PNA and possible COVID in February in [place], no recent illness. Denies any LOC, HA, current dizziness, fever/chills, SOB, cough, abd pain, current nausea, h/o heart disease. 74yo F with PMH R breast CA s/p lumpectomy, breast CA, HTN, HLD, DMT2, BIBEMS from home presenting with chest pain at 12pm today. Pt woke up feeling well, went to  to get COVID Ab testing (no current symptoms), and noted to have elevated BP to 200 systolic. Pt admits to not taking her morning BP meds. Went home and took her normal AM meds including clonidine 0.3mg along with her carvedilol, proceeded to have breakfast and re-measured BP, saw diastolic still high and took additional clonidine 0.2mg. Reports she felt dizziness and nausea, then vomiting x 1. BP was then low, so drank salty water and that is when she felt brief substernal CP. CP was non-radiating, lasted several seconds and resolved on own. Patient currently asymptomatic. Reports PNA and possible COVID in February in [place], no recent illness. Denies any LOC, HA, current dizziness, fever/chills, SOB, cough, abd pain, current nausea, h/o heart disease.

## 2020-05-12 NOTE — ED ADULT NURSE NOTE - NSIMPLEMENTINTERV_GEN_ALL_ED
Implemented All Fall Risk Interventions:  [place] to call system. Call bell, personal items and telephone within reach. Instruct patient to call for assistance. Room bathroom lighting operational. Non-slip footwear when patient is off stretcher. Physically safe environment: no spills, clutter or unnecessary equipment. Stretcher in lowest position, wheels locked, appropriate side rails in place. Provide visual cue, wrist band, yellow gown, etc. Monitor gait and stability. Monitor for mental status changes and reorient to person, place, and time. Review medications for side effects contributing to fall risk. Reinforce activity limits and safety measures with patient and family.

## 2020-05-12 NOTE — ED PROVIDER NOTE - MUSCULOSKELETAL, MLM
Spine appears normal, range of motion is not limited, no muscle or joint tenderness. No LE edema b/l.

## 2020-05-12 NOTE — ED PROVIDER NOTE - PATIENT PORTAL LINK FT
You can access the FollowMyHealth Patient Portal offered by [facility] by registering at the following website: [email or web address]. By joining [facility]’s FollowMyHealth portal, you will also be able to view your health information using other applications (apps) compatible with our system.

## 2020-05-12 NOTE — ED PROVIDER NOTE - CLINICAL SUMMARY MEDICAL DECISION MAKING FREE TEXT BOX
PETE Sweeney: 74yo F with PMH R breast CA s/p lumpectomy, breast CA, HTN, HLD, DMT2, presenting with chest pain and dizziness following extra dose of clonidine this AM. + n/v x 1. Symptoms resolved prior to arrival to ED. No syncope. No fever/chills, no recent illness. VSS. Exam unremarkable. Plan for labs, delta trop, EKG, CXR, re-eval. Likely d/c with PCP/cards f/u.

## 2020-05-12 NOTE — ED ADULT NURSE NOTE - OBJECTIVE STATEMENT
Pt BIBA from home for resolved chest pain.  Pt reports that she was at [facility] this morning for covid testing (asymptomatic, wanted test because she had PNA in February and feels it may have been covid) and when they took her BP it was 220s/110s.  She then went home and took and extra 0.2mg of her Clonidine and began to feel lightheaded when chest pressure about 15-20 minutes later.  EMS called and reported pt was 80s/50s on arrival, IV placed and fluids initiated, BP came up to 110s systolic once pt was placed in laying position.  Upon arrival here pt is normotensive and asymptomatic. Pt BIBA from home for resolved chest pain.  Pt reports that she was at [facility] this morning for covid testing (asymptomatic, wanted test because she had PNA in February and feels it may have been covid) and when they took her BP it was 220s/110s.  She then went home and took and extra 0.2mg of her Clonidine and began to feel lightheaded when chest pressure about 15-20 minutes later.  EMS called and reported pt was 80s/50s on arrival, IV placed and fluids initiated, BP came up to 110s systolic once pt was placed in laying position.  Upon arrival here pt is normotensive and asymptomatic.  Pt conversive, abd soft/nt/nd, skin wdi denies current chest pain, shortness of breath, abdominal pain, nausea/vomiting/diarrhea, urinary symptoms, falls, lightheadedness.  No cough, fever or sick contacts.

## 2020-05-19 ENCOUNTER — APPOINTMENT (OUTPATIENT)
Dept: ORTHOPEDIC SURGERY | Facility: CLINIC | Age: 75
End: 2020-05-19
Payer: MEDICARE

## 2020-05-19 VITALS
HEIGHT: 61 IN | HEART RATE: 73 BPM | DIASTOLIC BLOOD PRESSURE: 83 MMHG | BODY MASS INDEX: 33.04 KG/M2 | WEIGHT: 175 LBS | SYSTOLIC BLOOD PRESSURE: 179 MMHG

## 2020-05-19 DIAGNOSIS — Z96.653 PRESENCE OF ARTIFICIAL KNEE JOINT, BILATERAL: ICD-10-CM

## 2020-05-19 PROCEDURE — 99213 OFFICE O/P EST LOW 20 MIN: CPT

## 2020-05-19 PROCEDURE — 73562 X-RAY EXAM OF KNEE 3: CPT | Mod: 50

## 2020-05-19 NOTE — HISTORY OF PRESENT ILLNESS
[de-identified] : Patient has an appointment to see Dr. Cuenca next week comes to the office today by mistake. The patient was seen and accommodated. Patient has been basically sitting around and do to the [place] virus confinement and feels that the knees have become stiff she return to her physical therapy and had one or 2 treatments, but they've declined further treatment without a referralpatient is here today for one year followup and requesting physical therapy [Worsening] : worsening [___ wks] : [unfilled] week(s) ago [2] : an average pain level of 2/10 [1] : a minimum pain level of 1/10 [3] : a maximum pain level of 3/10 [Sitting] : sitting [Intermit.] : ~He/She~ states the symptoms seem to be intermittent [Walking] : worsened by walking [Physical Therapy] : relieved by physical therapy

## 2020-05-19 NOTE — DISCUSSION/SUMMARY
[de-identified] : Patient was advised that she be more supportive footwear, and she should begin a walking program in her yard. In her neighborhood observing social distance sitting and mask usage. Patient will be given a prescription for outpatient physical therapy for lower extremity strengthening range of motion and a home exercise program. If the patient can do on her own Patient should be seen back in the future as required. Every 3-5 years is recommended. She may call the office at any time with a question or problem

## 2020-05-19 NOTE — PHYSICAL EXAM
[Normal] : Gait: normal [LE] : Sensory: Intact in bilateral lower extremities [DP] : dorsalis pedis 2+ and symmetric bilaterally [PT] : posterior tibial 2+ and symmetric bilaterally [de-identified] : 75-year-old woman, walking, and now wedgesthat are not as supportive footwear as we would like to see someone on a walking program. Patient is walking somewhat stiffly but bearing weight equally on both lower extremities. On examination, the wounds are well-healed calves are nontender. Neurovascular status intact in both lower extremities. Both knees. Will come to full extension. Both knees were flexed beyond 110° no significant mediolateral instability. No anterior drawer, and no AP laxity. No significant popliteal fullness [de-identified] : 3 views of both knees in the office today show a well-positioned, bilateral total knee replacements with 3 peg patella centrally located in the trochlear grooveon both components appear well fixed and anatomically aligned

## 2020-05-26 ENCOUNTER — APPOINTMENT (OUTPATIENT)
Dept: ORTHOPEDIC SURGERY | Facility: CLINIC | Age: 75
End: 2020-05-26

## 2020-05-27 ENCOUNTER — OUTPATIENT (OUTPATIENT)
Dept: OUTPATIENT SERVICES | Facility: HOSPITAL | Age: 75
LOS: 1 days | Discharge: HOME | End: 2020-05-27

## 2020-05-27 DIAGNOSIS — C50.919 MALIGNANT NEOPLASM OF UNSPECIFIED SITE OF UNSPECIFIED FEMALE BREAST: ICD-10-CM

## 2020-05-27 DIAGNOSIS — Z90.2 ACQUIRED ABSENCE OF LUNG [PART OF]: Chronic | ICD-10-CM

## 2020-06-01 ENCOUNTER — APPOINTMENT (OUTPATIENT)
Dept: HEMATOLOGY ONCOLOGY | Facility: CLINIC | Age: 75
End: 2020-06-01
Payer: MEDICARE

## 2020-06-01 PROCEDURE — 99443: CPT | Mod: 95

## 2020-06-01 NOTE — REVIEW OF SYSTEMS
[SOB on Exertion] : shortness of breath during exertion [Negative] : Allergic/Immunologic [Depression] : no depression [FreeTextEntry5] : See interval history [FreeTextEntry2] : Energy good. Declined flu vaccination  [FreeTextEntry8] : Saw GYN Dunetz 8/2019.  No vaginal bleeding or spotting. Was told her uterine lining thickening. [FreeTextEntry7] : Most recent colonoscopy approximately 2015. Colonoscopy cancelled because of Covid 19 pandemic [FreeTextEntry9] : Most recent BMD 5/3/2018.  [de-identified] : Denies anxiety, depression, insomnia [de-identified] : Has intermittent rashes which she states are due to unknown allergy, relieved by antihistamines.

## 2020-06-01 NOTE — HISTORY OF PRESENT ILLNESS
[Medical Office: ([facility])___] : at the medical office located in  [Disease: _____________________] : Disease: [unfilled] [T: ___] : T[unfilled] [N: ___] : N[unfilled] [AJCC Stage: ____] : AJCC Stage: [unfilled] [M: ___] : M[unfilled] [Home] : at home, [unfilled] , at the time of the visit. [Verbal consent obtained from patient] : the patient, [unfilled] [Other:____] : [unfilled] [FreeTextEntry3] : Her daughter Shirin Soto [de-identified] : Well to moderately differentiated infiltrating ductal carcinoma ER positive, [place] positive, HER-2/tabitha negative [de-identified] : The patient presented in 2018, at age 73, with an abnormal screening mammogram.\par \par Screening mammogram performed on 2018 demonstrated a lobulated asymmetric density in the medial right breast. Diagnostic right tomosynthesis mammogram on the same day demonstrated a 7 mm lobulated slightly irregular nodule in the medial right breast,at approximately 3:00. It was not seen on ultrasound. Stereotactic core biopsy was performed on 2018 and demonstrated well to moderately differentiated ductal infiltrating ductal carcinoma which was ER positive (%), [place] positive (%) HER-2/tabitha negative (zero) and Ki-67 15%. 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 The infiltrating component measured 0.35 cm.\par \par Dr. Mindy Lowery performed a lumpectomy and sentinel lymph node biopsy on 2018. Pathology demonstrated no residual invasive carcinoma. There was DCIS, intermediate nuclear grade with necrosis and microcalcifications within the specimen measuring 0.6 cm and present in 2 of 14 blocks. The surgical margins were negative (>5 mm). There were 3 negative sentinel lymph nodes \par \par The patient has had an uneventful postoperative course.\par \par Risk factors:\par Prior breast disease: 2 previous biopsies, one demonstrating a papilloma. Menarche: Age 10. Menopause: Age 41. The patient is  5 para  with her first childbirth at age 26. She did not breast-feed her children. Oral contraceptive use: None. Hormone replacement therapy: None. Other hormone exposure: None. Topical estrogen: None. Family history is positive for a mother who had breast cancer in her late 70s/early 80s, a sister who had breast cancer at age 67, and a maternal first cousin who had breast cancer in her 50s. The patient herself has had adenocarcinoma of the lung and basal cell carcinomas. The patient denies any other family history of cancer or colorectal neoplasia. She is of Sephardic (Mal[place]ian)  Hoahaoism background.\par Inv[facility]ae Multi-Cancer Panel (83 genes) drawn on 2019 was negative for pathogenic variants and for variants of uncertain significance.\par  [FreeTextEntry1] : anastrozole start 2/14/2019 [de-identified] : Because of the COVID 19 pandemic the patient has consented to a telephonic visit. She declined a telemedicine visit today.\par \par Verbal consent given on 6/1/2020 at 10:08 AM by Shirin Soto, daughter.. Shirin Soto was present during the visit.\par \obed Has been on anastrozole 1 year 4 months,-tolerating well.\par \par Most recent visit here was 9/3/2019.  Cancelled appointment scheduled for 3/19/2020 because of Covid 19 pandemic\par \obed Was seen in cardiology consult 12/20/2019 for evaluation of blue toe felt to be secondary to peripheral vascular disease. Was continued on ASA, statin dose increased and patient told to keep feet warm. Has follow up scheduled, to have testing.\par \par Last saw breast surgeon Dr Mindy Lowery 8/2019. \par \par Most recent bilateral mammogram/breast US 8/2019. was told to have follow up right mammogram/breast US in 2/2020. I will request report\par \par Inv[facility]ae Multi-Cancer Panel (83 genes) drawn on 2/28/2019 by Leonor Justice negative for pathogenic variants and variants of uncertain significance.\par \par Had bilateral knee replacement with Dr Wilson at [facility] on 5/20/2019. 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 Plans to discuss with her cardiologist Dr Christian Bautista.\par \par Last saw endocrinologist Dr Shikha Bolivar 02/15/2019 .\par \par No other interval event since last seen.

## 2020-08-26 ENCOUNTER — OUTPATIENT (OUTPATIENT)
Dept: OUTPATIENT SERVICES | Facility: HOSPITAL | Age: 75
LOS: 1 days | Discharge: HOME | End: 2020-08-26

## 2020-08-26 DIAGNOSIS — Z90.2 ACQUIRED ABSENCE OF LUNG [PART OF]: Chronic | ICD-10-CM

## 2020-08-26 DIAGNOSIS — C50.919 MALIGNANT NEOPLASM OF UNSPECIFIED SITE OF UNSPECIFIED FEMALE BREAST: ICD-10-CM

## 2020-09-01 ENCOUNTER — APPOINTMENT (OUTPATIENT)
Dept: HEMATOLOGY ONCOLOGY | Facility: CLINIC | Age: 75
End: 2020-09-01
Payer: SELF-PAY

## 2020-09-01 VITALS
HEIGHT: 60.43 IN | HEART RATE: 62 BPM | OXYGEN SATURATION: 98 % | TEMPERATURE: 98.3 F | RESPIRATION RATE: 16 BRPM | BODY MASS INDEX: 32.47 KG/M2 | DIASTOLIC BLOOD PRESSURE: 77 MMHG | SYSTOLIC BLOOD PRESSURE: 131 MMHG | WEIGHT: 167.55 LBS

## 2020-09-01 PROCEDURE — 99214 OFFICE O/P EST MOD 30 MIN: CPT

## 2020-09-06 NOTE — PHYSICAL EXAM
[Restricted in physically strenuous activity but ambulatory and able to carry out work of a light or sedentary nature] : Status 1- Restricted in physically strenuous activity but ambulatory and able to carry out work of a light or sedentary nature, e.g., light house work, office work [Obese] : obese [Normal] : affect appropriate [de-identified] : 3/6 KIRTI UZAIRB [de-identified] : Right breast: healed incision UOQ. Healed incision UIQ with underlying hematoma. Healed incision right axilla with small seroma. Left breast: no mass.

## 2020-09-06 NOTE — REVIEW OF SYSTEMS
[SOB on Exertion] : shortness of breath during exertion [Negative] : Allergic/Immunologic [Depression] : no depression [FreeTextEntry3] : Last eye exam with benign findings [FreeTextEntry2] : Energy good. Declined flu vaccination  [FreeTextEntry5] : See interval history [FreeTextEntry6] : Saw pulmonologist Dr Hurtado 03/2020. [FreeTextEntry9] : Most recent BMD 5/3/2018.  [de-identified] : Intermittent rashes which she states are due to unknown allergy, relieved by antihistamines, saw dermatologist 07/2020 [FreeTextEntry8] : Saw GYN Dunetz 8/2019. Next f/u 9/08/2020. No vaginal bleeding or spotting. Was told her uterine lining thickening. [FreeTextEntry7] : Most recent colonoscopy approximately 2015. Colonoscopy cancelled because of Covid 19 pandemic, not yet reschedule [de-identified] : Denies anxiety, depression, insomnia

## 2020-09-06 NOTE — HISTORY OF PRESENT ILLNESS
[Disease: _____________________] : Disease: [unfilled] [T: ___] : T[unfilled] [N: ___] : N[unfilled] [M: ___] : M[unfilled] [AJCC Stage: ____] : AJCC Stage: [unfilled] [de-identified] : The patient presented in 2018, at age 73, with an abnormal screening mammogram.\par \par Screening mammogram performed on 2018 demonstrated a lobulated asymmetric density in the medial right breast. Diagnostic right tomosynthesis mammogram on the same day demonstrated a 7 mm lobulated slightly irregular nodule in the medial right breast,at approximately 3:00. It was not seen on ultrasound. Stereotactic core biopsy was performed on 2018 and demonstrated well to moderately differentiated ductal infiltrating ductal carcinoma which was ER positive (%), [place] positive (%) HER-2/tabitha negative (zero) and Ki-67 15%. 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 The infiltrating component measured 0.35 cm.\par \par Dr. Mindy Lowery performed a lumpectomy and sentinel lymph node biopsy on 2018. Pathology demonstrated no residual invasive carcinoma. There was DCIS, intermediate nuclear grade with necrosis and microcalcifications within the specimen measuring 0.6 cm and present in 2 of 14 blocks. The surgical margins were negative (>5 mm). There were 3 negative sentinel lymph nodes \par \par The patient has had an uneventful postoperative course.\par \par Risk factors:\par Prior breast disease: 2 previous biopsies, one demonstrating a papilloma. Menarche: Age 10. Menopause: Age 41. The patient is  5 para  with her first childbirth at age 26. She did not breast-feed her children. Oral contraceptive use: None. Hormone replacement therapy: None. Other hormone exposure: None. Topical estrogen: None. Family history is positive for a mother who had breast cancer in her late 70s/early 80s, a sister who had breast cancer at age 67, and a maternal first cousin who had breast cancer in her 50s. The patient herself has had adenocarcinoma of the lung and basal cell carcinomas. The patient denies any other family history of cancer or colorectal neoplasia. 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 State f/u appointment was cancelled due to COVID 19. \par \par Most recent bilateral mammogram/breast US 8/222019, BI RADS 2. Had  follow up  mammogram/breast US one week ago at [facility], was advised of benign findings. I will request report\par \par Invitae Multi-Cancer Panel (83 genes) drawn on 2/28/2019 by Leonor Justice negative for pathogenic variants and variants of uncertain significance.\par \par Had bilateral knee replacement with Dr Wilson at [facility] on 5/20/2019.\par \par Missed f/u appointment  08/29/2020 with thoracic surgeon Dr Perez at [facility] [place] for f/u s/p  removal at lung nodule. The patient plan to call for f/u appointment.\par \par Last saw endocrinologist Dr Shikha Bolivar 02/15/2019 for f/u and management of her hypothyroidism, exam stable.\par \par Missed medical appointments follow up  due to spouse health issues, was recently discharged from inpatient hospitalizations from angina due to clogged stents. \par \par Overall the patient states she feels well since last seen.\par \par No recent medical diagnosis nor intercurrent issues.

## 2020-09-22 ENCOUNTER — APPOINTMENT (OUTPATIENT)
Dept: HEMATOLOGY ONCOLOGY | Facility: CLINIC | Age: 75
End: 2020-09-22

## 2021-02-23 ENCOUNTER — OUTPATIENT (OUTPATIENT)
Dept: OUTPATIENT SERVICES | Facility: HOSPITAL | Age: 76
LOS: 1 days | End: 2021-02-23
Payer: MEDICARE

## 2021-02-23 ENCOUNTER — APPOINTMENT (OUTPATIENT)
Dept: ULTRASOUND IMAGING | Facility: IMAGING CENTER | Age: 76
End: 2021-02-23
Payer: MEDICARE

## 2021-02-23 DIAGNOSIS — Z00.8 ENCOUNTER FOR OTHER GENERAL EXAMINATION: ICD-10-CM

## 2021-02-23 DIAGNOSIS — Z90.2 ACQUIRED ABSENCE OF LUNG [PART OF]: Chronic | ICD-10-CM

## 2021-02-23 PROCEDURE — 76536 US EXAM OF HEAD AND NECK: CPT

## 2021-02-23 PROCEDURE — 76536 US EXAM OF HEAD AND NECK: CPT | Mod: 26

## 2021-05-03 ENCOUNTER — OUTPATIENT (OUTPATIENT)
Dept: OUTPATIENT SERVICES | Facility: HOSPITAL | Age: 76
LOS: 1 days | Discharge: HOME | End: 2021-05-03

## 2021-05-03 DIAGNOSIS — Z90.2 ACQUIRED ABSENCE OF LUNG [PART OF]: Chronic | ICD-10-CM

## 2021-05-03 DIAGNOSIS — C50.919 MALIGNANT NEOPLASM OF UNSPECIFIED SITE OF UNSPECIFIED FEMALE BREAST: ICD-10-CM

## 2021-05-04 ENCOUNTER — APPOINTMENT (OUTPATIENT)
Dept: HEMATOLOGY ONCOLOGY | Facility: CLINIC | Age: 76
End: 2021-05-04
Payer: MEDICARE

## 2021-05-04 VITALS
WEIGHT: 173.72 LBS | OXYGEN SATURATION: 95 % | HEART RATE: 76 BPM | RESPIRATION RATE: 16 BRPM | BODY MASS INDEX: 33.66 KG/M2 | TEMPERATURE: 97.9 F | HEIGHT: 60.43 IN

## 2021-05-04 PROCEDURE — 99214 OFFICE O/P EST MOD 30 MIN: CPT

## 2021-05-04 RX ORDER — ESCITALOPRAM OXALATE 10 MG/1
10 TABLET, FILM COATED ORAL
Refills: 0 | Status: DISCONTINUED | COMMUNITY
Start: 2019-09-03 | End: 2021-05-04

## 2021-05-04 NOTE — REVIEW OF SYSTEMS
[SOB on Exertion] : shortness of breath during exertion [Negative] : Allergic/Immunologic [Depression] : no depression

## 2021-05-04 NOTE — PHYSICAL EXAM
[Restricted in physically strenuous activity but ambulatory and able to carry out work of a light or sedentary nature] : Status 1- Restricted in physically strenuous activity but ambulatory and able to carry out work of a light or sedentary nature, e.g., light house work, office work [Obese] : obese [Normal] : affect appropriate [de-identified] : Right breast: healed incision UOQ. Healed incision UIQ with underlying seroma.  Left breast: no mass. No axillary LAD. [de-identified] : 3/6 KIRTI UZAIRB

## 2021-05-04 NOTE — HISTORY OF PRESENT ILLNESS
[Disease: _____________________] : Disease: [unfilled] [T: ___] : T[unfilled] [N: ___] : N[unfilled] [M: ___] : M[unfilled] [AJCC Stage: ____] : AJCC Stage: [unfilled] [de-identified] : The patient presented in 2018, at age 73, with an abnormal screening mammogram.\par \par Screening mammogram performed on 2018 demonstrated a lobulated asymmetric density in the medial right breast. Diagnostic right tomosynthesis mammogram on the same day demonstrated a 7 mm lobulated slightly irregular nodule in the medial right breast,at approximately 3:00. It was not seen on ultrasound. \par \par Stereotactic core biopsy was performed on 2018 and demonstrated well to moderately differentiated ductal infiltrating ductal carcinoma which was ER positive (%), [place] positive (%) HER-2/tabitha negative (zero) and Ki-67 15%. 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 The infiltrating component measured 0.35 cm.\par \par Dr. Mindy Lowery performed a lumpectomy and sentinel lymph node biopsy on 2018. Pathology demonstrated no residual invasive carcinoma. There was DCIS, intermediate nuclear grade with necrosis and microcalcifications within the specimen measuring 0.6 cm and present in 2 of 14 blocks. The surgical margins were negative (>5 mm). There were 3 negative sentinel lymph nodes \par \par The patient has had an uneventful postoperative course.  \par \par Pt started anastrozole 2019 and continues to the present time.\par \par Risk factors:\par Prior breast disease: 2 previous biopsies, one demonstrating a papilloma. Menarche: Age 10. Menopause: Age 41. The patient is  5 para 2032 with her first childbirth at age 26. She did not breast-feed her children. Oral contraceptive use: None. Hormone replacement therapy: None. Other hormone exposure: None. Topical estrogen: None. Family history is positive for a mother who had breast cancer in her late 70s/early 80s, a sister who had breast cancer at age 67, and a maternal first cousin who had breast cancer in her 50s. The patient herself has had adenocarcinoma of the lung and basal cell carcinomas. The patient denies any other family history of cancer or colorectal neoplasia. She is of Sephardic (Filipino)  [place] background. Invitae Multi-Cancer Panel (83 genes) drawn on 2019 was negative for pathogenic variants and for variants of uncertain significance. 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 Laurence Barnes. \par Anastrozole start 2/14/2019, tolerating well.\par Last saw breast surgeon Dr Mindy Lowery 8/2019. State f/u appointment was cancelled due to COVID 19. Plans to fu in 8/2021.\par Most recent bilateral mammogram/breast US 8/18/2020 stable, BI RADS 2.  Right 2:00 complex seroma stable 2.4cm.\par Labs done 4/2021 at Dr. Shikha Simpson's office\par \par HEALTH MAINTENANCE\par PCP/cardiologist Dr Christian Bautista 07/2020 for f/u and management of her peripheral vascular disease and 'irreg HR' s/p ablation. Currently stable. Will fu for BP.\par Last eye exam 5/3/2021 with benign findings.  Will be receiving laser tx 5/11/2021. b/l cataract removal 3 yrs ago.\par Thoracic surgeon Dr Perez at [facility] [place] for f/u s/p resection of lung nodule 11/2015, sees once a year.  Saw pulmonologist Dr Hurtado 03/2020, reports progressively worsening SOB for the past 5 years, plans to follow up with Dr. Hurtado this week.\par Most recent colonoscopy approximately 2015. Colonoscopy cancelled because of Covid 19 pandemic, deferring not yet reschedule GI Dr. Beaulieu. \par Saw GYN Dunetz 3/2021. No vaginal bleeding or spotting. Was told her uterine lining thickening on TVUS, and continue to monitor. 'uterine mass' monitoring due to avoidance of surgery. Will get report.\par Most recent BMD 5/3/2018 osteopenia L1-L4, T -2.0.  Pt had repeat 2-3 wks ago at [facility], will get report.\par Intermittent rashes which she states are due to unknown allergy, relieved by antihistamines, saw dermatologist 07/2020.  Has appt with allergist coming up.\par Had bilateral knee replacement with Dr Wilson at [facility] on 5/20/2019.\par Last saw endocrinologist Dr Shikha Simpson 4/2021 for f/u and management of her hypothyroidism, exam stable.\par No COVID vaccine due to h/o allergy, pt very discomforted by mask

## 2021-10-06 ENCOUNTER — OUTPATIENT (OUTPATIENT)
Dept: OUTPATIENT SERVICES | Facility: HOSPITAL | Age: 76
LOS: 1 days | Discharge: HOME | End: 2021-10-06
Payer: MEDICARE

## 2021-10-06 DIAGNOSIS — Z90.2 ACQUIRED ABSENCE OF LUNG [PART OF]: Chronic | ICD-10-CM

## 2021-10-06 DIAGNOSIS — C50.919 MALIGNANT NEOPLASM OF UNSPECIFIED SITE OF UNSPECIFIED FEMALE BREAST: ICD-10-CM

## 2021-10-07 ENCOUNTER — RX RENEWAL (OUTPATIENT)
Age: 76
End: 2021-10-07

## 2021-10-08 ENCOUNTER — APPOINTMENT (OUTPATIENT)
Dept: HEMATOLOGY ONCOLOGY | Facility: CLINIC | Age: 76
End: 2021-10-08
Payer: MEDICARE

## 2021-10-08 VITALS
BODY MASS INDEX: 29.67 KG/M2 | DIASTOLIC BLOOD PRESSURE: 82 MMHG | HEART RATE: 56 BPM | RESPIRATION RATE: 16 BRPM | WEIGHT: 159.17 LBS | OXYGEN SATURATION: 98 % | TEMPERATURE: 98.4 F | HEIGHT: 61.42 IN | SYSTOLIC BLOOD PRESSURE: 158 MMHG

## 2021-10-08 PROCEDURE — 99214 OFFICE O/P EST MOD 30 MIN: CPT

## 2021-10-08 PROCEDURE — 93010 ELECTROCARDIOGRAM REPORT: CPT

## 2021-10-19 NOTE — ASSESSMENT
Department of Internal Medicine  Nephrology Progress Note      Events reviewed. SUBJECTIVE: We are following Mr. Mehul [address] for NGUYỄN. Reports no complaints.     PHYSICAL EXAM:      Vitals:    VITALS:  /63   Pulse 83   Temp 97.9 °F (36.6 °C) (Temporal)   Resp 16   Ht 5' 8\" (1.727 m)   Wt 153 lb 8 oz (69.6 kg)   SpO2 97%   BMI 23.34 kg/m²   24HR INTAKE/OUTPUT:      Intake/Output Summary (Last 24 hours) at 10/19/2021 1836  Last data filed at 10/19/2021 0910  Gross per 24 hour   Intake 480 ml   Output 175 ml   Net 305 ml     Constitutional: Patient is awake, alert in no distress  HEENT: Pupils are equal reactive  Respiratory: Decreased breath sounds at the bases  Cardiovascular/Edema: Heart sounds are regular  Gastrointestinal: Abdomen soft  Neurologic: Patient alert   Other: +trace edema      Scheduled Meds:   sodium chloride flush  5-40 mL IntraVENous BID    ipratropium-albuterol  1 ampule Inhalation Q4H WA    aspirin  81 mg Oral Daily    vitamin C  500 mg Oral BID    atorvastatin  40 mg Oral Daily    [Held by provider] bumetanide  1 mg Oral Daily    carvedilol  3.125 mg Oral BID WC    clopidogrel  75 mg Oral Daily    enoxaparin  30 mg SubCUTAneous Daily    ferrous sulfate  325 mg Oral BID WC    folic acid  1 mg Oral Daily    nystatin   Topical BID    pantoprazole  40 mg Oral QAM AC    amiodarone  200 mg Oral BID    Followed by   Brianna Nugent ON 10/21/2021] amiodarone  200 mg Oral Daily     Continuous Infusions:    PRN Meds:.acetaminophen, bisacodyl, oxyCODONE-acetaminophen **OR** oxyCODONE-acetaminophen, sennosides-docusate sodium, diphenhydrAMINE, artificial tears, ondansetron, sodium chloride, polyethylene glycol    DATA:    CBC:   Lab Results   Component Value Date    WBC 6.9 10/17/2021    RBC 2.91 10/17/2021    HGB 8.6 10/17/2021    HCT 26.5 10/17/2021    MCV 91.1 10/17/2021    MCH 29.6 10/17/2021    MCHC 32.5 10/17/2021    RDW 15.3 10/17/2021     10/17/2021    MPV 10.8 10/17/2021 [FreeTextEntry1] : Infiltrating ductal carcinoma right breast, grade 1-2, ER positive, [place] positive, HER-2/tabitha negative, T1a N0 M0 (AJCC 8 edition), Status post lumpectomy/sentinel node biopsy, Status post RT\par \par -anastrozole started 2/14/2019, tolerating well.\par -The patient is clinically stable.\par -up to date with imaging, will send scripts to [facility] for 8/2021.\par -Labs done 4/2021 at Dr. Shikha Bolivar's office, will get results. CMP:    Lab Results   Component Value Date     10/18/2021    K 3.6 10/18/2021    K 4.2 10/14/2021     10/18/2021    CO2 22 10/18/2021    BUN 25 10/18/2021    CREATININE 1.8 10/18/2021    GFRAA 45 10/18/2021    LABGLOM 37 10/18/2021    GLUCOSE 90 10/18/2021    PROT 5.4 10/12/2021    LABALBU 3.1 10/12/2021    CALCIUM 8.1 10/18/2021    BILITOT 0.8 10/12/2021    ALKPHOS 47 10/12/2021    AST 15 10/12/2021    ALT 10 10/12/2021     Magnesium:    Lab Results   Component Value Date    MG 1.9 10/17/2021     Phosphorus:    Lab Results   Component Value Date    PHOS 2.8 10/10/2021        Radiology Review:      CXR 10/3/21   1. Median sternotomy changes. 2. Bilateral pleuroparenchymal opacities that could be related to pleural   effusion and edema.  The appearance of the chest is about the same or   slightly worse.         Chest x-ray October 6, 2021   1. Stable appearance of the postoperative chest.   2. Bilateral chest tubes remain in position.  There is no right or left   pneumothorax. 3. Small right pleural effusion         Chest x-ray October 8, 2021   Minimal bibasilar atelectasis.       Trace right pleural effusion       There is no pneumothorax             BRIEF SUMMARY OF INITIAL CONSULT:    Briefly Mr. Iam Carmen is a 27-year-old man with history of HTN, CAD with recent status cardiac catheterization done on September 9 which showed severe multivessel disease, hyperlipidemia, hiatal hernia, who was admitted for elective CABG on September 27, 2021. On admission his creatinine level was 0.8 mg/dL and post surgery his creatinine has progressively increased up to 2.3 mg/dL, reason for this consultation. Postoperatively she developed persistent hypotension, lactic acidosis and respiratory failure for which he was reintubated. Since then she has required multiple pressors and he was placed on IABP. He had received 1 dose of ketorolac perioperatively.  His urine output has significantly decrease and is being about 500 cc/day in the last 48 hours and his fluid balance presently is over 9 L. Problems resolved:    · Cardiogenic shock, hemodynamically more stable, pressor support decrease, still on IABP. Tentative plan for removal of IABP. Pro-BP 16,936  · Hypernatremia with hypervolemia, 2/2 sodium containing IV fluid resuscitation and underlying heart failure. Resolved with  natriuresis and free water (add D5W)  · Hypokalemia, 2/2 diuretics, potassium levels improve  · Respiratory alkalosis (pH: 7.554, PCO2: 86.9) with metabolic alkalosis (bicarbonate administration)  · Respiratory failure status post intubation  · Thrombocytopenia  · Transaminitis with hyperbilirubinemia, possibly shock liver versus congestive liver  · Probably pneumonia, on piperacillin-tazobactam  · Hypokalemia, 2/2 diuretics, potassium levels improved. IMPRESSION/RECOMMENDATIONS:      1. NGUYỄN stage III, CABG associated NGUYỄN, ischemic ATN, non-oliguric. FEUrea 18.8%. Renal function improving, creatinine down to 1.8 mg/L.    2. Hypokalemia, 2/2 diuretics, potassium levels improved  3. HFmEF 40% with stage IDD, proBNP 16,826 > 9461> 7228 > 5112,, proBNP levels continues to improve    ---------------------------------------------------------    4. CAD status post CABG x3 September 27, 2021, on ASA, clopidogrel, carvedilol, atorvastatin  5. Amiodarone therapy, for AF prophylaxis  6.  Normocytic anemia, status post surgery, status post transfusion, iron saturation 36%, on p.o. iron      Plan:    · Restart Bumex 1 mg p.o. daily  · Continue to monitor renal function for recovery

## 2021-10-20 NOTE — HISTORY OF PRESENT ILLNESS
[Disease: _____________________] : Disease: [unfilled] [T: ___] : T[unfilled] [N: ___] : N[unfilled] [M: ___] : M[unfilled] [AJCC Stage: ____] : AJCC Stage: [unfilled] [de-identified] : The patient presented in 2018, at age 73, with an abnormal screening mammogram.\par \par Screening mammogram performed on 2018 demonstrated a lobulated asymmetric density in the medial right breast. Diagnostic right tomosynthesis mammogram on the same day demonstrated a 7 mm lobulated slightly irregular nodule in the medial right breast,at approximately 3:00. It was not seen on ultrasound. \par \par Stereotactic core biopsy was performed on 2018 and demonstrated well to moderately differentiated ductal infiltrating ductal carcinoma which was ER positive (%), IN positive (%) HER-2/tabitha negative (zero) and Ki-67 15%. 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 The infiltrating component measured 0.35 cm.\par \par Dr. Mindy Lowery performed a lumpectomy and sentinel lymph node biopsy on 2018. Pathology demonstrated no residual invasive carcinoma. There was DCIS, intermediate nuclear grade with necrosis and microcalcifications within the specimen measuring 0.6 cm and present in 2 of 14 blocks. The surgical margins were negative (>5 mm). There were 3 negative sentinel lymph nodes \par \par The patient has had an uneventful postoperative course.  \par \par Pt started anastrozole 2019 and continues to the present time.\par \par Risk factors:\par Prior breast disease: 2 previous biopsies, one demonstrating a papilloma. Menarche: Age 10. Menopause: Age 41. The patient is  5 para 2032 with her first childbirth at age 26. She did not breast-feed her children. Oral contraceptive use: None. Hormone replacement therapy: None. Other hormone exposure: None. Topical estrogen: None. Family history is positive for a mother who had breast cancer in her late 70s/early 80s, a sister who had breast cancer at age 67, and a maternal first cousin who had breast cancer in her 50s. The patient herself has had adenocarcinoma of the lung and basal cell carcinomas. The patient denies any other family history of cancer or colorectal neoplasia. She is of Sephardic (Martiniquais)  Nondenominational background. [employer] Multi-Cancer Panel (83 genes) drawn on 2019 was negative for pathogenic variants and for variants of uncertain significance. [de-identified] : Well to moderately differentiated infiltrating ductal carcinoma ER positive, IL positive, HER-2/tabitha negative [FreeTextEntry1] : anastrozole start 2/14/2019 [de-identified] : \par Anastrozole start 2/14/2019, tolerating well.\par Mammo/US 8/25/21, calcifications in right breast are almost certainly related to post-op fat necrosis and probably benign; rec fu right diagnostic mammo in 6mos recommended, BIRADS3.\par Recent CT A/P 9/24/21 done for epigastric pain, n/v, weight loss which showed [id number] tiny cystic lesion of pancreatic body, suspect IPMN, contrast MRI rec; 10mm right adrenal nodule also should be evaluated by MRI, findings suspicious for abnormally thickened endometrium measuring up to 21mm in thickness, erec eval with pelvic sono, diverticulosis, no acute diverticulitis.  Saw Dr. Hodgson GYN with no change in TVUS.  \par Had MRI abdomen 9/29/21 - subcm pancreatic cystic lesions up to 8mm without worrisome features compatible with side branch IPMNs, fu MRI abd/MRCP in 2 yrs for interval change; 7mm right adrenal adenoma, 1.1cm intraluminal gastric polpoid lesion, upper endo rec; incidental abnormal appearance of endometrium, rec biopsy.\par Last saw breast surgeon Dr Mindy Lowery this year.\par Felt lightheaded today while in the waiting room, BP and blood sugar wnl.  Found to have bradycardic to 56, HR usually in 55-70's, EKG sinus julia to HR 55 with 1st degree AV block, on coreg, rec to fu with cardiologist regarding HR. Pt felt better during our visit.\par \par HEALTH MAINTENANCE\par PCP/cardiologist Dr Christian Bautista f/u and management of her peripheral vascular disease and 'irreg HR' s/p ablation. Currently stable. Will fu for BP.\par Last eye exam 5/3/2021 with benign findings.  Will be receiving laser tx 5/11/2021. b/l cataract removal 3 yrs ago.\par Thoracic surgeon Dr Perez at [facility] [place] for f/u s/p resection of lung nodule 11/2015, sees once a year.  Saw pulmonologist Dr Hurtado 03/2020, reports progressively worsening SOB for the past 5 years.\par Most recent colonoscopy approximately 2015. Colonoscopy and endoscopy sched for 11/2021 with Dr. Beaulieu. \par Saw GYN Rema 3/2021. No vaginal bleeding or spotting. Was told her uterine lining thickening on TVUS, and continue to monitor. 'uterine mass' monitoring due to avoidance of surgery. Will get report.\par Most recent BMD 5/3/2018 osteopenia L1-L4, T -2.0.  BMD 3/9/21 osteopenia T-1.6 in spine, improved\par Intermittent rashes which she states are due to unknown allergy, relieved by antihistamines, has seen multiple allergists/dermatologist last 07/2020 without clear etiology.  \par Had bilateral knee replacement with Dr Wilson at [facility] on 5/20/2019.\par Last saw endocrinologist Dr Shikha Simpson 4/2021 for f/u and management of her hypothyroidism, exam stable.\par No COVID vaccine due to h/o allergy, pt very discomforted by mask

## 2021-10-20 NOTE — PHYSICAL EXAM
[Restricted in physically strenuous activity but ambulatory and able to carry out work of a light or sedentary nature] : Status 1- Restricted in physically strenuous activity but ambulatory and able to carry out work of a light or sedentary nature, e.g., light house work, office work [Obese] : obese [Normal] : affect appropriate [de-identified] : 3/6 KIRTI UZAIRB [de-identified] : Right breast: healed incision UOQ. Healed incision UIQ with underlying seroma.  Left breast: no mass. No axillary LAD.

## 2021-10-20 NOTE — ASSESSMENT
[FreeTextEntry1] : Infiltrating ductal carcinoma right breast, grade 1-2, ER positive, OR positive, HER-2/tabitha negative, T1a N0 M0 (AJCC 8 edition), Status post lumpectomy/sentinel node biopsy, Status post RT\par \par -anastrozole started 2/14/2019, tolerating well.\par -The patient is clinically stable.\par -up to date with imaging, Mammo/US 8/25/21, calcifications in right breast are almost certainly related to post-op fat necrosis and probably benign; rec fu right diagnostic mammo in 6mos recommended, BIRADS3.\par -Labs done 4/2021 at Dr. Shikha Bolivar's office, will get results.\par -continue to fu with breast surgeon\par -FU in 6mos

## 2021-11-19 ENCOUNTER — APPOINTMENT (OUTPATIENT)
Dept: OTOLARYNGOLOGY | Facility: CLINIC | Age: 76
End: 2021-11-19
Payer: MEDICARE

## 2021-11-19 VITALS
SYSTOLIC BLOOD PRESSURE: 141 MMHG | WEIGHT: 155 LBS | DIASTOLIC BLOOD PRESSURE: 67 MMHG | BODY MASS INDEX: 28.89 KG/M2 | HEART RATE: 59 BPM | HEIGHT: 61.5 IN

## 2021-11-19 DIAGNOSIS — H92.01 OTALGIA, RIGHT EAR: ICD-10-CM

## 2021-11-19 DIAGNOSIS — J39.2 OTHER DISEASES OF PHARYNX: ICD-10-CM

## 2021-11-19 DIAGNOSIS — R13.10 DYSPHAGIA, UNSPECIFIED: ICD-10-CM

## 2021-11-19 DIAGNOSIS — K21.9 GASTRO-ESOPHAGEAL REFLUX DISEASE W/OUT ESOPHAGITIS: ICD-10-CM

## 2021-11-19 PROCEDURE — 99204 OFFICE O/P NEW MOD 45 MIN: CPT | Mod: 25

## 2021-11-19 PROCEDURE — 31575 DIAGNOSTIC LARYNGOSCOPY: CPT

## 2021-11-19 RX ORDER — OMEPRAZOLE 20 MG/1
20 CAPSULE, DELAYED RELEASE ORAL DAILY
Qty: 1 | Refills: 1 | Status: ACTIVE | COMMUNITY
Start: 2021-11-19 | End: 1900-01-01

## 2021-11-19 NOTE — REASON FOR VISIT
[Initial Consultation] : an initial consultation for [FreeTextEntry2] : referred by Dr Bates for difficulty swallowing

## 2021-11-19 NOTE — PROCEDURE
[de-identified] : Afrin 0.05% and lidocaine 4% sprayed into both nasal passages. Flexible scope #2 used. Passed through nasal passage and nasopharynx/oropharynx/hypopharynx clear. Supraglottis normal. Glottis with fully mobile vocal cords without lesions or masses. + interarytenoid pachydermia. Visualized subglottis clear. Postcricoid area with erythema and edema. No pooling of secretions.

## 2021-11-19 NOTE — CONSULT LETTER
[Dear  ___] : Dear  [unfilled], [Courtesy Letter:] : I had the pleasure of seeing your patient, [unfilled], in my office today. [Please see my note below.] : Please see my note below. [Sincerely,] : Sincerely, [FreeTextEntry2] : Dr Christian Easley [FreeTextEntry3] : Gabbi Patricio MD\par Otolaryngology and Cranial Base Surgery\par Attending Physician - Department of Otolaryngology and Head & Neck Surgery

## 2021-11-19 NOTE — ASSESSMENT
[FreeTextEntry1] : LPRD:\par - reflux precaution handout given and reviewed with patient\par - omeprazole 20mg daily x 1 month then taper off as reflux management strategies take effect\par - discussed that omeprazole is generally safe and with no history of kidney issues would not be concerned that it will cause kidney failure as she has read online - advised we are using short course only of one month\par - f/u if symptoms not resolved by 1 month \par \par dysphagia:\par - recommend MBS to eval for any functional swallowing issues that may benefit from speech therapy\par \par \par

## 2021-11-19 NOTE — HISTORY OF PRESENT ILLNESS
[de-identified] : 76 year old female referred by Dr Bates for difficulty swallowing.  States for the last year, has to drink while eating she feels the food getting stuck in throat, in morning has phlegm in the throat at times able to cough up green thick phlegm, dry throat, occasional right otalgia, hearing loss, and dizziness at times. Patient denies recent throat infections, fevers, odynophagia, post nasal drip, dysphonia, otorrhea, or tinnitus.  [facility] gastroenterologist prescribed Omeprazole but refused to take it due to the potential kidney side effects. She had upper endoscopy and no H- pylori.\par \par

## 2021-12-24 ENCOUNTER — TRANSCRIPTION ENCOUNTER (OUTPATIENT)
Age: 76
End: 2021-12-24

## 2022-01-03 ENCOUNTER — RX RENEWAL (OUTPATIENT)
Age: 77
End: 2022-01-03

## 2022-03-31 ENCOUNTER — OUTPATIENT (OUTPATIENT)
Dept: OUTPATIENT SERVICES | Facility: HOSPITAL | Age: 77
LOS: 1 days | Discharge: HOME | End: 2022-03-31

## 2022-03-31 DIAGNOSIS — Z90.2 ACQUIRED ABSENCE OF LUNG [PART OF]: Chronic | ICD-10-CM

## 2022-03-31 DIAGNOSIS — C50.919 MALIGNANT NEOPLASM OF UNSPECIFIED SITE OF UNSPECIFIED FEMALE BREAST: ICD-10-CM

## 2022-04-05 ENCOUNTER — RESULT REVIEW (OUTPATIENT)
Age: 77
End: 2022-04-05

## 2022-04-05 ENCOUNTER — APPOINTMENT (OUTPATIENT)
Dept: HEMATOLOGY ONCOLOGY | Facility: CLINIC | Age: 77
End: 2022-04-05
Payer: MEDICARE

## 2022-04-05 VITALS
TEMPERATURE: 97.7 F | SYSTOLIC BLOOD PRESSURE: 151 MMHG | HEART RATE: 70 BPM | RESPIRATION RATE: 16 BRPM | WEIGHT: 156.53 LBS | DIASTOLIC BLOOD PRESSURE: 77 MMHG | BODY MASS INDEX: 30.73 KG/M2 | OXYGEN SATURATION: 96 % | HEIGHT: 59.84 IN

## 2022-04-05 DIAGNOSIS — M85.80 OTHER SPECIFIED DISORDERS OF BONE DENSITY AND STRUCTURE, UNSPECIFIED SITE: ICD-10-CM

## 2022-04-05 LAB
25(OH)D3 SERPL-MCNC: 72.6 NG/ML
ALBUMIN SERPL ELPH-MCNC: 4.2 G/DL
ALP BLD-CCNC: 90 U/L
ALT SERPL-CCNC: 12 U/L
ANION GAP SERPL CALC-SCNC: 14 MMOL/L
AST SERPL-CCNC: 18 U/L
BASOPHILS # BLD AUTO: 0.04 K/UL — SIGNIFICANT CHANGE UP (ref 0–0.2)
BASOPHILS NFR BLD AUTO: 0.7 % — SIGNIFICANT CHANGE UP (ref 0–2)
BILIRUB SERPL-MCNC: 0.9 MG/DL
BUN SERPL-MCNC: 11 MG/DL
CALCIUM SERPL-MCNC: 9.4 MG/DL
CHLORIDE SERPL-SCNC: 100 MMOL/L
CO2 SERPL-SCNC: 29 MMOL/L
CREAT SERPL-MCNC: 0.84 MG/DL
EGFR: 71 ML/MIN/1.73M2
EOSINOPHIL # BLD AUTO: 0.14 K/UL — SIGNIFICANT CHANGE UP (ref 0–0.5)
EOSINOPHIL NFR BLD AUTO: 2.5 % — SIGNIFICANT CHANGE UP (ref 0–6)
GLUCOSE SERPL-MCNC: 132 MG/DL
HCT VFR BLD CALC: 41.7 % — SIGNIFICANT CHANGE UP (ref 34.5–45)
HGB BLD-MCNC: 14 G/DL — SIGNIFICANT CHANGE UP (ref 11.5–15.5)
IMM GRANULOCYTES NFR BLD AUTO: 0.2 % — SIGNIFICANT CHANGE UP (ref 0–1.5)
LYMPHOCYTES # BLD AUTO: 1.7 K/UL — SIGNIFICANT CHANGE UP (ref 1–3.3)
LYMPHOCYTES # BLD AUTO: 30.2 % — SIGNIFICANT CHANGE UP (ref 13–44)
MCHC RBC-ENTMCNC: 27 PG — SIGNIFICANT CHANGE UP (ref 27–34)
MCHC RBC-ENTMCNC: 33.6 G/DL — SIGNIFICANT CHANGE UP (ref 32–36)
MCV RBC AUTO: 80.5 FL — SIGNIFICANT CHANGE UP (ref 80–100)
MONOCYTES # BLD AUTO: 0.44 K/UL — SIGNIFICANT CHANGE UP (ref 0–0.9)
MONOCYTES NFR BLD AUTO: 7.8 % — SIGNIFICANT CHANGE UP (ref 2–14)
NEUTROPHILS # BLD AUTO: 3.3 K/UL — SIGNIFICANT CHANGE UP (ref 1.8–7.4)
NEUTROPHILS NFR BLD AUTO: 58.6 % — SIGNIFICANT CHANGE UP (ref 43–77)
NRBC # BLD: 0 /100 WBCS — SIGNIFICANT CHANGE UP (ref 0–0)
PLATELET # BLD AUTO: 168 K/UL — SIGNIFICANT CHANGE UP (ref 150–400)
POTASSIUM SERPL-SCNC: 3.7 MMOL/L
PROT SERPL-MCNC: 6.4 G/DL
RBC # BLD: 5.18 M/UL — SIGNIFICANT CHANGE UP (ref 3.8–5.2)
RBC # FLD: 13.1 % — SIGNIFICANT CHANGE UP (ref 10.3–14.5)
SODIUM SERPL-SCNC: 143 MMOL/L
WBC # BLD: 5.63 K/UL — SIGNIFICANT CHANGE UP (ref 3.8–10.5)
WBC # FLD AUTO: 5.63 K/UL — SIGNIFICANT CHANGE UP (ref 3.8–10.5)

## 2022-04-05 PROCEDURE — 99214 OFFICE O/P EST MOD 30 MIN: CPT

## 2022-04-05 RX ORDER — ADHESIVE TAPE 3"X 2.3 YD
50 MCG TAPE, NON-MEDICATED TOPICAL
Refills: 0 | Status: ACTIVE | COMMUNITY
Start: 2018-08-28

## 2022-04-05 NOTE — HISTORY OF PRESENT ILLNESS
[de-identified] : The patient presented in 2018, at age 73, with an abnormal screening mammogram.\par \par Screening mammogram performed on 2018 demonstrated a lobulated asymmetric density in the medial right breast. Diagnostic right tomosynthesis mammogram on the same day demonstrated a 7 mm lobulated slightly irregular nodule in the medial right breast,at approximately 3:00. It was not seen on ultrasound. \par \par Stereotactic core biopsy was performed on 2018 and demonstrated well to moderately differentiated ductal infiltrating ductal carcinoma which was ER positive (%), NJ positive (%) HER-2/tabitha negative (zero) and Ki-67 15%. 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 The infiltrating component measured 0.35 cm.\par \par Dr. Mindy Lowery performed a lumpectomy and sentinel lymph node biopsy on 2018. Pathology demonstrated no residual invasive carcinoma. There was DCIS, intermediate nuclear grade with necrosis and microcalcifications within the specimen measuring 0.6 cm and present in 2 of 14 blocks. The surgical margins were negative (>5 mm). There were 3 negative sentinel lymph nodes \par \par The patient has had an uneventful postoperative course.  \par \par Pt started anastrozole 2019 and continues to the present time.\par \par Risk factors:\par Prior breast disease: 2 previous biopsies, one demonstrating a papilloma. Menarche: Age 10. Menopause: Age 41. The patient is  5 para 2032 with her first childbirth at age 26. She did not breast-feed her children. Oral contraceptive use: None. Hormone replacement therapy: None. Other hormone exposure: None. Topical estrogen: None. Family history is positive for a mother who had breast cancer in her late 70s/early 80s, a sister who had breast cancer at age 67, and a maternal first cousin who had breast cancer in her 50s. The patient herself has had adenocarcinoma of the lung and basal cell carcinomas. The patient denies any other family history of cancer or colorectal neoplasia. She is of Sephardic ([place])  [place] background. Invitae Multi-Cancer Panel (83 genes) drawn on 2019 was negative for pathogenic variants and for variants of uncertain significance. [de-identified] : Well to moderately differentiated infiltrating ductal carcinoma ER positive, SC positive, HER-2/tabitha negative [FreeTextEntry1] : anastrozole start 2/14/2019 [de-identified] : 4/5/2022\par Patient presents today to rule out metastatic breast cancer and assess treatment toxicity.\par Anastrozole start 2/14/2019, tolerating well.\par Patient denies any SOB, CP, abdominal pain, bone pain or headache.\par Patient denies any hotflashes, arthralgias, vaginal dryness, vaginal bleeding, hair loss, muscle cramps.\par Patient denies any breast masses, breast tenderness, skin changes or nipple discharge.\par + urinary incontinence - has tried numerous medical interventions, but is currently working with an acupuncturist\par \par Mammo/US 8/25/21, calcifications in right breast are almost certainly related to post-op fat necrosis and probably benign; rec fu right diagnostic mammo in 6mos recommended, BIRADS3. - f/up 2/28/22 - diagnostic f/up stable;  NEXT b/l ImAGING due 8/2022\par Recent CT A/P 9/24/21 done for epigastric pain, n/v, weight loss which showed [id number] tiny cystic lesion of pancreatic body, suspect IPMN, contrast MRI rec; 10mm right adrenal nodule also should be evaluated by MRI, findings suspicious for abnormally thickened endometrium measuring up to 21mm in thickness, erec eval with pelvic sono, diverticulosis, no acute diverticulitis.  Saw Dr. Hodgson GYN with no change in TVUS.  \par Had MRI abdomen 9/29/21 - subcm pancreatic cystic lesions up to 8mm without worrisome features compatible with side branch IPMNs, fu MRI abd/MRCP in 2 yrs for interval change; 7mm right adrenal adenoma, 1.1cm intraluminal gastric polpoid lesion, upper endo rec; incidental abnormal appearance of endometrium, \par \par \par HEALTH MAINTENANCE\par PCP/cardiologist Dr Christian Bautista f/u and management of her peripheral vascular disease and 'irreg HR' s/p ablation. Currently stable. Will fu for BP.\par Last eye exam 5/3/2021 with benign findings.  Will be receiving laser tx 5/11/2021. b/l cataract removal 3 yrs ago.\par Thoracic surgeon Dr Perez at [facility] [place] for f/u s/p resection of lung nodule 11/2015, sees once a year.  Saw pulmonologist Dr Hurtado 03/2020, reports progressively worsening SOB for the past 5 years.\par Most recent colonoscopy approximately 2015. Colonoscopy and endoscopy sched for 11/2021 with Dr. Beaulieu. \par Saw GYN Dunetz 3/2021. No vaginal bleeding or spotting. Was told her uterine lining thickening on TVUS, and continue to monitor. 'uterine mass' monitoring due to avoidance of surgery. Will get report.\par Most recent BMD 5/3/2018 osteopenia L1-L4, T -2.0.  BMD 3/9/21 osteopenia T-1.6 in spine, improved\par Intermittent rashes which she states are due to unknown allergy, relieved by antihistamines, has seen multiple allergists/dermatologist last 07/2020 without clear etiology.  \par Had bilateral knee replacement with Dr Wilson at [facility] on 5/20/2019.\par Last saw endocrinologist Dr Shikha Simpson  for f/u and management of her hypothyroidism, exam stable.; 1/21/2022 \par No COVID vaccine due to h/o allergy, pt very discomforted by mask

## 2022-04-05 NOTE — ASSESSMENT
[FreeTextEntry1] : Infiltrating ductal carcinoma right breast, grade 1-2, ER positive, IL positive, HER-2/tabitha negative, T1a N0 M0 (AJCC 8 edition), Status post lumpectomy/sentinel node biopsy, Status post RT\par \par -anastrozole started 2/14/2019, tolerating well.\par - CATRACHITA x 3 years\par -The patient is clinically stable.\par - Mammo/sono bilateral due 8/2022\par -Osteopenia T- 1.7:  Bone Density due 3/2023\par -Labs done 1/2022 at Dr. Shikha Bolivar's office - reviewed results; no clinically significant findings. \par -continue to fu with breast surgeon\par -FU in 6mos

## 2022-04-05 NOTE — REVIEW OF SYSTEMS
[Incontinence] : incontinence [Dysmenorrhea/Abn Vaginal Bleeding] : no dysmenorrhea/abnormal vaginal bleeding [Depression] : no depression

## 2022-04-05 NOTE — PHYSICAL EXAM
[de-identified] : 3/6 KIRTI UZAIRB [de-identified] : Right breast: healed incision UOQ. 5 mm nodule just superior to surgical scar - fat necrosis on imaging.  Left breast: no mass. No axillary LAD. [de-identified] : ecchymosis from insulin injections

## 2022-05-03 ENCOUNTER — OUTPATIENT (OUTPATIENT)
Dept: OUTPATIENT SERVICES | Facility: HOSPITAL | Age: 77
LOS: 1 days | End: 2022-05-03
Payer: MEDICARE

## 2022-05-03 ENCOUNTER — APPOINTMENT (OUTPATIENT)
Dept: ULTRASOUND IMAGING | Facility: IMAGING CENTER | Age: 77
End: 2022-05-03
Payer: MEDICARE

## 2022-05-03 DIAGNOSIS — Z00.8 ENCOUNTER FOR OTHER GENERAL EXAMINATION: ICD-10-CM

## 2022-05-03 DIAGNOSIS — Z90.2 ACQUIRED ABSENCE OF LUNG [PART OF]: Chronic | ICD-10-CM

## 2022-05-03 PROCEDURE — 76536 US EXAM OF HEAD AND NECK: CPT

## 2022-05-03 PROCEDURE — 76536 US EXAM OF HEAD AND NECK: CPT | Mod: 26

## 2022-10-11 ENCOUNTER — OUTPATIENT (OUTPATIENT)
Dept: OUTPATIENT SERVICES | Facility: HOSPITAL | Age: 77
LOS: 1 days | Discharge: HOME | End: 2022-10-11

## 2022-10-11 DIAGNOSIS — C50.919 MALIGNANT NEOPLASM OF UNSPECIFIED SITE OF UNSPECIFIED FEMALE BREAST: ICD-10-CM

## 2022-10-11 DIAGNOSIS — Z90.2 ACQUIRED ABSENCE OF LUNG [PART OF]: Chronic | ICD-10-CM

## 2022-10-13 ENCOUNTER — APPOINTMENT (OUTPATIENT)
Dept: HEMATOLOGY ONCOLOGY | Facility: CLINIC | Age: 77
End: 2022-10-13

## 2022-11-29 ENCOUNTER — INPATIENT (INPATIENT)
Facility: HOSPITAL | Age: 77
LOS: 2 days | Discharge: HOME HEALTH | DRG: 871 | End: 2022-12-02
Attending: INTERNAL MEDICINE | Admitting: INTERNAL MEDICINE
Payer: MEDICARE

## 2022-11-29 VITALS
HEIGHT: 63 IN | SYSTOLIC BLOOD PRESSURE: 180 MMHG | WEIGHT: 154.98 LBS | TEMPERATURE: 100 F | RESPIRATION RATE: 24 BRPM | HEART RATE: 100 BPM | OXYGEN SATURATION: 90 % | DIASTOLIC BLOOD PRESSURE: 110 MMHG

## 2022-11-29 DIAGNOSIS — E04.2 NONTOXIC MULTINODULAR GOITER: ICD-10-CM

## 2022-11-29 DIAGNOSIS — Z02.9 ENCOUNTER FOR ADMINISTRATIVE EXAMINATIONS, UNSPECIFIED: ICD-10-CM

## 2022-11-29 DIAGNOSIS — J96.01 ACUTE RESPIRATORY FAILURE WITH HYPOXIA: ICD-10-CM

## 2022-11-29 DIAGNOSIS — E11.9 TYPE 2 DIABETES MELLITUS WITHOUT COMPLICATIONS: ICD-10-CM

## 2022-11-29 DIAGNOSIS — Z96.653 PRESENCE OF ARTIFICIAL KNEE JOINT, BILATERAL: Chronic | ICD-10-CM

## 2022-11-29 DIAGNOSIS — Z85.118 PERSONAL HISTORY OF OTHER MALIGNANT NEOPLASM OF BRONCHUS AND LUNG: ICD-10-CM

## 2022-11-29 DIAGNOSIS — Z90.2 ACQUIRED ABSENCE OF LUNG [PART OF]: Chronic | ICD-10-CM

## 2022-11-29 DIAGNOSIS — J18.9 PNEUMONIA, UNSPECIFIED ORGANISM: ICD-10-CM

## 2022-11-29 DIAGNOSIS — R94.31 ABNORMAL ELECTROCARDIOGRAM [ECG] [EKG]: ICD-10-CM

## 2022-11-29 DIAGNOSIS — Z29.9 ENCOUNTER FOR PROPHYLACTIC MEASURES, UNSPECIFIED: ICD-10-CM

## 2022-11-29 DIAGNOSIS — Z85.3 PERSONAL HISTORY OF MALIGNANT NEOPLASM OF BREAST: ICD-10-CM

## 2022-11-29 DIAGNOSIS — I10 ESSENTIAL (PRIMARY) HYPERTENSION: ICD-10-CM

## 2022-11-29 LAB
ALBUMIN SERPL ELPH-MCNC: 4.6 G/DL — SIGNIFICANT CHANGE UP (ref 3.3–5)
ALP SERPL-CCNC: 92 U/L — SIGNIFICANT CHANGE UP (ref 40–120)
ALT FLD-CCNC: 14 U/L — SIGNIFICANT CHANGE UP (ref 10–45)
ANION GAP SERPL CALC-SCNC: 14 MMOL/L — SIGNIFICANT CHANGE UP (ref 5–17)
AST SERPL-CCNC: 28 U/L — SIGNIFICANT CHANGE UP (ref 10–40)
BASE EXCESS BLDV CALC-SCNC: 8.9 MMOL/L — HIGH (ref -2–3)
BASOPHILS # BLD AUTO: 0.04 K/UL — SIGNIFICANT CHANGE UP (ref 0–0.2)
BASOPHILS NFR BLD AUTO: 0.4 % — SIGNIFICANT CHANGE UP (ref 0–2)
BILIRUB SERPL-MCNC: 2.4 MG/DL — HIGH (ref 0.2–1.2)
BUN SERPL-MCNC: 13 MG/DL — SIGNIFICANT CHANGE UP (ref 7–23)
CA-I SERPL-SCNC: 1.08 MMOL/L — LOW (ref 1.15–1.33)
CALCIUM SERPL-MCNC: 9.1 MG/DL — SIGNIFICANT CHANGE UP (ref 8.4–10.5)
CHLORIDE BLDV-SCNC: 91 MMOL/L — LOW (ref 96–108)
CHLORIDE SERPL-SCNC: 92 MMOL/L — LOW (ref 96–108)
CO2 BLDV-SCNC: 36 MMOL/L — HIGH (ref 22–26)
CO2 SERPL-SCNC: 31 MMOL/L — SIGNIFICANT CHANGE UP (ref 22–31)
CREAT SERPL-MCNC: 0.82 MG/DL — SIGNIFICANT CHANGE UP (ref 0.5–1.3)
EGFR: 73 ML/MIN/1.73M2 — SIGNIFICANT CHANGE UP
EOSINOPHIL # BLD AUTO: 0 K/UL — SIGNIFICANT CHANGE UP (ref 0–0.5)
EOSINOPHIL NFR BLD AUTO: 0 % — SIGNIFICANT CHANGE UP (ref 0–6)
FLUAV H1 2009 PAND RNA SPEC QL NAA+PROBE: DETECTED
GAS PNL BLDV: 132 MMOL/L — LOW (ref 136–145)
GAS PNL BLDV: SIGNIFICANT CHANGE UP
GAS PNL BLDV: SIGNIFICANT CHANGE UP
GLUCOSE BLDC GLUCOMTR-MCNC: 174 MG/DL — HIGH (ref 70–99)
GLUCOSE BLDV-MCNC: 193 MG/DL — HIGH (ref 70–99)
GLUCOSE SERPL-MCNC: 192 MG/DL — HIGH (ref 70–99)
HCO3 BLDV-SCNC: 35 MMOL/L — HIGH (ref 22–29)
HCT VFR BLD CALC: 42.9 % — SIGNIFICANT CHANGE UP (ref 34.5–45)
HCT VFR BLDA CALC: 51 % — HIGH (ref 34.5–46.5)
HGB BLD CALC-MCNC: 16.9 G/DL — HIGH (ref 11.7–16.1)
HGB BLD-MCNC: 14.8 G/DL — SIGNIFICANT CHANGE UP (ref 11.5–15.5)
IMM GRANULOCYTES NFR BLD AUTO: 0.5 % — SIGNIFICANT CHANGE UP (ref 0–0.9)
LACTATE BLDV-MCNC: 1.6 MMOL/L — SIGNIFICANT CHANGE UP (ref 0.5–2)
LYMPHOCYTES # BLD AUTO: 0.32 K/UL — LOW (ref 1–3.3)
LYMPHOCYTES # BLD AUTO: 3.2 % — LOW (ref 13–44)
MAGNESIUM SERPL-MCNC: 1.6 MG/DL — SIGNIFICANT CHANGE UP (ref 1.6–2.6)
MAGNESIUM SERPL-MCNC: 2.1 MG/DL — SIGNIFICANT CHANGE UP (ref 1.6–2.6)
MCHC RBC-ENTMCNC: 27 PG — SIGNIFICANT CHANGE UP (ref 27–34)
MCHC RBC-ENTMCNC: 34.5 GM/DL — SIGNIFICANT CHANGE UP (ref 32–36)
MCV RBC AUTO: 78.1 FL — LOW (ref 80–100)
MONOCYTES # BLD AUTO: 0.41 K/UL — SIGNIFICANT CHANGE UP (ref 0–0.9)
MONOCYTES NFR BLD AUTO: 4.1 % — SIGNIFICANT CHANGE UP (ref 2–14)
NEUTROPHILS # BLD AUTO: 9.19 K/UL — HIGH (ref 1.8–7.4)
NEUTROPHILS NFR BLD AUTO: 91.8 % — HIGH (ref 43–77)
NRBC # BLD: 0 /100 WBCS — SIGNIFICANT CHANGE UP (ref 0–0)
PCO2 BLDV: 50 MMHG — HIGH (ref 39–42)
PH BLDV: 7.45 — HIGH (ref 7.32–7.43)
PLATELET # BLD AUTO: 144 K/UL — LOW (ref 150–400)
PO2 BLDV: 48 MMHG — HIGH (ref 25–45)
POTASSIUM BLDV-SCNC: 2.3 MMOL/L — CRITICAL LOW (ref 3.5–5.1)
POTASSIUM SERPL-MCNC: 2.3 MMOL/L — CRITICAL LOW (ref 3.5–5.3)
POTASSIUM SERPL-MCNC: 4 MMOL/L — SIGNIFICANT CHANGE UP (ref 3.5–5.3)
POTASSIUM SERPL-SCNC: 2.3 MMOL/L — CRITICAL LOW (ref 3.5–5.3)
POTASSIUM SERPL-SCNC: 4 MMOL/L — SIGNIFICANT CHANGE UP (ref 3.5–5.3)
PROT SERPL-MCNC: 7.6 G/DL — SIGNIFICANT CHANGE UP (ref 6–8.3)
RAPID RVP RESULT: DETECTED
RBC # BLD: 5.49 M/UL — HIGH (ref 3.8–5.2)
RBC # FLD: 13 % — SIGNIFICANT CHANGE UP (ref 10.3–14.5)
SAO2 % BLDV: 80.3 % — SIGNIFICANT CHANGE UP (ref 67–88)
SARS-COV-2 RNA SPEC QL NAA+PROBE: SIGNIFICANT CHANGE UP
SODIUM SERPL-SCNC: 137 MMOL/L — SIGNIFICANT CHANGE UP (ref 135–145)
WBC # BLD: 10.01 K/UL — SIGNIFICANT CHANGE UP (ref 3.8–10.5)
WBC # FLD AUTO: 10.01 K/UL — SIGNIFICANT CHANGE UP (ref 3.8–10.5)

## 2022-11-29 PROCEDURE — 99285 EMERGENCY DEPT VISIT HI MDM: CPT | Mod: CS

## 2022-11-29 PROCEDURE — 99223 1ST HOSP IP/OBS HIGH 75: CPT

## 2022-11-29 PROCEDURE — 71045 X-RAY EXAM CHEST 1 VIEW: CPT | Mod: 26

## 2022-11-29 PROCEDURE — 93010 ELECTROCARDIOGRAM REPORT: CPT

## 2022-11-29 RX ORDER — SODIUM CHLORIDE 9 MG/ML
1000 INJECTION, SOLUTION INTRAVENOUS
Refills: 0 | Status: DISCONTINUED | OUTPATIENT
Start: 2022-11-29 | End: 2022-12-02

## 2022-11-29 RX ORDER — ASPIRIN/CALCIUM CARB/MAGNESIUM 324 MG
81 TABLET ORAL DAILY
Refills: 0 | Status: DISCONTINUED | OUTPATIENT
Start: 2022-11-29 | End: 2022-12-02

## 2022-11-29 RX ORDER — CARVEDILOL PHOSPHATE 80 MG/1
6.25 CAPSULE, EXTENDED RELEASE ORAL
Refills: 0 | Status: DISCONTINUED | OUTPATIENT
Start: 2022-11-29 | End: 2022-12-02

## 2022-11-29 RX ORDER — ACETAMINOPHEN 500 MG
650 TABLET ORAL ONCE
Refills: 0 | Status: COMPLETED | OUTPATIENT
Start: 2022-11-29 | End: 2022-11-29

## 2022-11-29 RX ORDER — INSULIN LISPRO 100/ML
VIAL (ML) SUBCUTANEOUS
Refills: 0 | Status: DISCONTINUED | OUTPATIENT
Start: 2022-11-29 | End: 2022-12-02

## 2022-11-29 RX ORDER — DEXTROSE 50 % IN WATER 50 %
25 SYRINGE (ML) INTRAVENOUS ONCE
Refills: 0 | Status: DISCONTINUED | OUTPATIENT
Start: 2022-11-29 | End: 2022-12-02

## 2022-11-29 RX ORDER — ONDANSETRON 8 MG/1
4 TABLET, FILM COATED ORAL ONCE
Refills: 0 | Status: COMPLETED | OUTPATIENT
Start: 2022-11-29 | End: 2022-11-29

## 2022-11-29 RX ORDER — GLUCAGON INJECTION, SOLUTION 0.5 MG/.1ML
1 INJECTION, SOLUTION SUBCUTANEOUS ONCE
Refills: 0 | Status: DISCONTINUED | OUTPATIENT
Start: 2022-11-29 | End: 2022-12-02

## 2022-11-29 RX ORDER — CHOLECALCIFEROL (VITAMIN D3) 125 MCG
2000 CAPSULE ORAL DAILY
Refills: 0 | Status: DISCONTINUED | OUTPATIENT
Start: 2022-11-29 | End: 2022-12-02

## 2022-11-29 RX ORDER — ATORVASTATIN CALCIUM 80 MG/1
1 TABLET, FILM COATED ORAL
Qty: 0 | Refills: 0 | DISCHARGE

## 2022-11-29 RX ORDER — MAGNESIUM SULFATE 500 MG/ML
2 VIAL (ML) INJECTION ONCE
Refills: 0 | Status: COMPLETED | OUTPATIENT
Start: 2022-11-29 | End: 2022-11-29

## 2022-11-29 RX ORDER — DEXTROSE 50 % IN WATER 50 %
12.5 SYRINGE (ML) INTRAVENOUS ONCE
Refills: 0 | Status: DISCONTINUED | OUTPATIENT
Start: 2022-11-29 | End: 2022-12-02

## 2022-11-29 RX ORDER — ROSUVASTATIN CALCIUM 5 MG/1
1 TABLET ORAL
Qty: 0 | Refills: 0 | DISCHARGE

## 2022-11-29 RX ORDER — FAMOTIDINE 10 MG/ML
1 INJECTION INTRAVENOUS
Qty: 0 | Refills: 0 | DISCHARGE

## 2022-11-29 RX ORDER — ALBUTEROL 90 UG/1
2 AEROSOL, METERED ORAL EVERY 6 HOURS
Refills: 0 | Status: DISCONTINUED | OUTPATIENT
Start: 2022-11-29 | End: 2022-12-02

## 2022-11-29 RX ORDER — ATORVASTATIN CALCIUM 80 MG/1
20 TABLET, FILM COATED ORAL AT BEDTIME
Refills: 0 | Status: DISCONTINUED | OUTPATIENT
Start: 2022-11-29 | End: 2022-12-02

## 2022-11-29 RX ORDER — PANTOPRAZOLE SODIUM 20 MG/1
40 TABLET, DELAYED RELEASE ORAL
Refills: 0 | Status: DISCONTINUED | OUTPATIENT
Start: 2022-11-29 | End: 2022-12-02

## 2022-11-29 RX ORDER — IPRATROPIUM/ALBUTEROL SULFATE 18-103MCG
3 AEROSOL WITH ADAPTER (GRAM) INHALATION ONCE
Refills: 0 | Status: DISCONTINUED | OUTPATIENT
Start: 2022-11-29 | End: 2022-11-29

## 2022-11-29 RX ORDER — ANASTROZOLE 1 MG/1
1 TABLET ORAL DAILY
Refills: 0 | Status: DISCONTINUED | OUTPATIENT
Start: 2022-11-29 | End: 2022-12-02

## 2022-11-29 RX ORDER — POTASSIUM CHLORIDE 20 MEQ
40 PACKET (EA) ORAL EVERY 4 HOURS
Refills: 0 | Status: COMPLETED | OUTPATIENT
Start: 2022-11-29 | End: 2022-11-29

## 2022-11-29 RX ORDER — CEFTRIAXONE 500 MG/1
1000 INJECTION, POWDER, FOR SOLUTION INTRAMUSCULAR; INTRAVENOUS EVERY 24 HOURS
Refills: 0 | Status: DISCONTINUED | OUTPATIENT
Start: 2022-11-30 | End: 2022-11-30

## 2022-11-29 RX ORDER — TRAZODONE HCL 50 MG
0.5 TABLET ORAL
Qty: 0 | Refills: 0 | DISCHARGE

## 2022-11-29 RX ORDER — INSULIN LISPRO 100/ML
VIAL (ML) SUBCUTANEOUS AT BEDTIME
Refills: 0 | Status: DISCONTINUED | OUTPATIENT
Start: 2022-11-29 | End: 2022-12-02

## 2022-11-29 RX ORDER — AZITHROMYCIN 500 MG/1
500 TABLET, FILM COATED ORAL ONCE
Refills: 0 | Status: COMPLETED | OUTPATIENT
Start: 2022-11-29 | End: 2022-11-29

## 2022-11-29 RX ORDER — BUDESONIDE, GLYCOPYRROLATE, AND FORMOTEROL FUMARATE 160; 9; 4.8 UG/1; UG/1; UG/1
2 AEROSOL, METERED RESPIRATORY (INHALATION)
Qty: 0 | Refills: 0 | DISCHARGE

## 2022-11-29 RX ORDER — DEXTROSE 50 % IN WATER 50 %
15 SYRINGE (ML) INTRAVENOUS ONCE
Refills: 0 | Status: DISCONTINUED | OUTPATIENT
Start: 2022-11-29 | End: 2022-12-02

## 2022-11-29 RX ORDER — AZITHROMYCIN 500 MG/1
500 TABLET, FILM COATED ORAL EVERY 24 HOURS
Refills: 0 | Status: DISCONTINUED | OUTPATIENT
Start: 2022-11-30 | End: 2022-11-30

## 2022-11-29 RX ORDER — CHOLECALCIFEROL (VITAMIN D3) 125 MCG
2 CAPSULE ORAL
Qty: 0 | Refills: 0 | DISCHARGE

## 2022-11-29 RX ORDER — ACETAMINOPHEN 500 MG
325 TABLET ORAL ONCE
Refills: 0 | Status: COMPLETED | OUTPATIENT
Start: 2022-11-29 | End: 2022-11-29

## 2022-11-29 RX ORDER — CEFTRIAXONE 500 MG/1
1000 INJECTION, POWDER, FOR SOLUTION INTRAMUSCULAR; INTRAVENOUS ONCE
Refills: 0 | Status: COMPLETED | OUTPATIENT
Start: 2022-11-29 | End: 2022-11-29

## 2022-11-29 RX ORDER — ACETAMINOPHEN 500 MG
650 TABLET ORAL EVERY 6 HOURS
Refills: 0 | Status: DISCONTINUED | OUTPATIENT
Start: 2022-11-29 | End: 2022-12-02

## 2022-11-29 RX ORDER — ANASTROZOLE 1 MG/1
1 TABLET ORAL
Qty: 0 | Refills: 0 | DISCHARGE

## 2022-11-29 RX ORDER — ASPIRIN/CALCIUM CARB/MAGNESIUM 324 MG
1 TABLET ORAL
Qty: 0 | Refills: 0 | DISCHARGE

## 2022-11-29 RX ORDER — INSULIN LISPRO 100/ML
10 VIAL (ML) SUBCUTANEOUS
Qty: 0 | Refills: 0 | DISCHARGE

## 2022-11-29 RX ORDER — LOSARTAN POTASSIUM 100 MG/1
100 TABLET, FILM COATED ORAL DAILY
Refills: 0 | Status: DISCONTINUED | OUTPATIENT
Start: 2022-11-29 | End: 2022-12-02

## 2022-11-29 RX ORDER — INSULIN DEGLUDEC 100 U/ML
50 INJECTION, SOLUTION SUBCUTANEOUS
Qty: 0 | Refills: 0 | DISCHARGE

## 2022-11-29 RX ORDER — ENOXAPARIN SODIUM 100 MG/ML
40 INJECTION SUBCUTANEOUS EVERY 24 HOURS
Refills: 0 | Status: DISCONTINUED | OUTPATIENT
Start: 2022-11-29 | End: 2022-12-02

## 2022-11-29 RX ORDER — POTASSIUM CHLORIDE 20 MEQ
10 PACKET (EA) ORAL
Refills: 0 | Status: COMPLETED | OUTPATIENT
Start: 2022-11-29 | End: 2022-11-29

## 2022-11-29 RX ADMIN — Medication 40 MILLIEQUIVALENT(S): at 19:49

## 2022-11-29 RX ADMIN — Medication 650 MILLIGRAM(S): at 18:08

## 2022-11-29 RX ADMIN — Medication 40 MILLIEQUIVALENT(S): at 15:03

## 2022-11-29 RX ADMIN — Medication 325 MILLIGRAM(S): at 20:55

## 2022-11-29 RX ADMIN — Medication 100 MILLIEQUIVALENT(S): at 19:50

## 2022-11-29 RX ADMIN — Medication 650 MILLIGRAM(S): at 22:16

## 2022-11-29 RX ADMIN — AZITHROMYCIN 255 MILLIGRAM(S): 500 TABLET, FILM COATED ORAL at 14:18

## 2022-11-29 RX ADMIN — Medication 100 MILLIEQUIVALENT(S): at 17:46

## 2022-11-29 RX ADMIN — CARVEDILOL PHOSPHATE 6.25 MILLIGRAM(S): 80 CAPSULE, EXTENDED RELEASE ORAL at 18:08

## 2022-11-29 RX ADMIN — Medication 75 MILLIGRAM(S): at 19:48

## 2022-11-29 RX ADMIN — ONDANSETRON 4 MILLIGRAM(S): 8 TABLET, FILM COATED ORAL at 14:51

## 2022-11-29 RX ADMIN — CEFTRIAXONE 100 MILLIGRAM(S): 500 INJECTION, POWDER, FOR SOLUTION INTRAMUSCULAR; INTRAVENOUS at 14:09

## 2022-11-29 RX ADMIN — ATORVASTATIN CALCIUM 20 MILLIGRAM(S): 80 TABLET, FILM COATED ORAL at 22:16

## 2022-11-29 RX ADMIN — ALBUTEROL 2 PUFF(S): 90 AEROSOL, METERED ORAL at 18:12

## 2022-11-29 RX ADMIN — LOSARTAN POTASSIUM 100 MILLIGRAM(S): 100 TABLET, FILM COATED ORAL at 20:55

## 2022-11-29 RX ADMIN — Medication 0.3 MILLIGRAM(S): at 18:11

## 2022-11-29 RX ADMIN — ALBUTEROL 2 PUFF(S): 90 AEROSOL, METERED ORAL at 23:36

## 2022-11-29 RX ADMIN — ENOXAPARIN SODIUM 40 MILLIGRAM(S): 100 INJECTION SUBCUTANEOUS at 18:09

## 2022-11-29 RX ADMIN — Medication 25 GRAM(S): at 15:01

## 2022-11-29 RX ADMIN — Medication 100 MILLIEQUIVALENT(S): at 15:49

## 2022-11-29 RX ADMIN — Medication 81 MILLIGRAM(S): at 18:14

## 2022-11-29 RX ADMIN — ANASTROZOLE 1 MILLIGRAM(S): 1 TABLET ORAL at 19:48

## 2022-11-29 NOTE — H&P ADULT - NSICDXPASTSURGICALHX_GEN_ALL_CORE_FT
PAST SURGICAL HISTORY:  H/O total knee replacement, bilateral     History of D&C 2007    History of lobectomy of lung 2015, right upper lobe    Status Post Breast Lumpectomy right 2018

## 2022-11-29 NOTE — H&P ADULT - PROBLEM SELECTOR PLAN 7
See above.   FS S/S for now to avoid hypoglycemia, would review patient's bedtime Lantus requirements for tonight/tomorrow.

## 2022-11-29 NOTE — ED PROVIDER NOTE - OBJECTIVE STATEMENT
77F with h/o HTN, HLD, T2DM, Breast Ca (s/p lumpectomy) p/w trouble breathing, cough, fever. She says that she was caring for her granddaughter who was diagnosed with the flu last week. Onset 2 days ago, progressive worsening. not vaccinated for COVID or flu. Pt +N/V (NBNB), +myalgias, +fever/chills.

## 2022-11-29 NOTE — ED PROVIDER NOTE - CLINICAL SUMMARY MEDICAL DECISION MAKING FREE TEXT BOX
See Attending Note 77F with h/o HTN, HLD, T2DM, BCa (s/p lumpectomy) p/w hypoxia, cough, N/V, myalgis after recent flu exposure (unvaccinated). Likely viral syndrome c/b hypoxia. Timeline of symptoms c/w viral syndrome 2/2 flu exposure.   - Labs, BCx, UA+UCx  - CXR  - O2 supplementation  - Observe off abx for now      See Attending Note

## 2022-11-29 NOTE — H&P ADULT - PROBLEM SELECTOR PLAN 3
See above.   S/P CTT chest per patient by Dr. Lawrence 3 weeks ago (patient refuses repeat CTT chest)>>would consider contacting in the AM to clarify recent results as above.

## 2022-11-29 NOTE — ED ADULT NURSE REASSESSMENT NOTE - NS ED NURSE REASSESS COMMENT FT1
C/O nausea, MD aware, medicated per MD orderers, IV antibiotics and  Magnesium 2 gm infusing via pump, afebrile, will continue to monitor pt

## 2022-11-29 NOTE — ED ADULT NURSE NOTE - OBJECTIVE STATEMENT
received pt in assigned area a&Xo3 c/o  cough, fever, nausea & bodyaches x2 days, exposed to family member with INFLUENZA, denies any cp or sob, skin intact resps even and on laobred, o2 sat on 2l via NC 97%, speaking in full sentences, no resp distress noted,   took Tylenol prior to coming to ED, famly at bedside, will continue to monitor, p tin nad

## 2022-11-29 NOTE — ED PROVIDER NOTE - PHYSICAL EXAMINATION
GEN: Awake, AOx3, NAD.  HEENT: NCAT  ---EYES: no scleral icterus  CARDIO: RRR.   RESP: b/l crackles in lung bases; transmitted upper airway sounds  ABD: Soft, NTND.   MSK: No obvious deformity or ROM deficit. 2+ pulses x4.   SKIN: Warm, dry. No rashes  NEURO: Moves all four extremities spontaneously  PSYCH: Appropriate mood & affect.

## 2022-11-29 NOTE — H&P ADULT - NSHPPHYSICALEXAM_GEN_ALL_CORE
Afebrile.   HR  87    RR 14   /110-195/97    (patient did not take her BP Rx today)    97% on 2 L/min.

## 2022-11-29 NOTE — ED PROVIDER NOTE - NS ED ROS FT
GEN: as per HPI  EYES: No vision changes, irritation, itchiness  ENT: as per HPI  RESP: as per HPI  CARDIOVASCULAR: No chest pain or palpitations  GI: as per HPI  :  No change in urine output; no dysuria, hematuria, or discharge  MSK: as per HPI  SKIN: No rashes  NEURO:  no abnormal movements; no numbness or tingling  Remainder negative, except as per the HPI

## 2022-11-29 NOTE — H&P ADULT - PROBLEM SELECTOR PLAN 2
See above.   Presumed to be in remission.   Would consider contacting patient's oncologist, Dr. STACY Camargo, in the AM.

## 2022-11-29 NOTE — PATIENT PROFILE ADULT - NSPROPTRIGHTSUPPORTPERSON_GEN_A_NUR
Called patient not available left a message to call clinic. Patient has pending ultrasound.  Need to know if she had done.   
same name as above

## 2022-11-29 NOTE — H&P ADULT - NSHPLABSRESULTS_GEN_ALL_CORE
EKG tracing interpreted by me with sinus at 87 with 1AV with   (likely from hypokalemia and hypomagnesemia)    Chest radiograph interpreted by me with no acute infiltrate or effusion.    WBC 10.0    92N    Hgb 14.8    Platelets of 144K    Random glucose of 192  Cr 0.8    K + 2.3>>supplemented.  Mg++ 106>>supplemented.    Cr 0.8  Alb 4.6    RVP and COVID-19 PCR>>sent.    Thyroid US from May 3 2022 with multiple thyroid nodules no change from prior study.

## 2022-11-29 NOTE — H&P ADULT - PROBLEM SELECTOR PLAN 5
Upgraded to telemetry--likely from hypokalemia and hypomagnesemia.   Will temporarily HOLD the HCTZ component of the Hyzaar.

## 2022-11-29 NOTE — ED PROVIDER NOTE - ATTENDING CONTRIBUTION TO CARE
pt 74yo F with PMH R breast CA s/p lumpectomy, breast CA, HTN, HLD, DMT2, BIBEMS from home presenting with Hypoxia in the low 90s the nursing home patient placed on oxygen and sent to the ER nasal cannula patient appears comfortable in no respiratory distress.  Lungs are clear to auscultation.  CHF work-up initiated with history of CHF.  Also to swab for possible viral infection.

## 2022-11-29 NOTE — H&P ADULT - PROBLEM SELECTOR PLAN 9
Transitions of Care Status:  1.  Name of PCP:    Christian Naranjo MD (PCP-Cardiology) [phone]  2.  PCP Contacted on Admission: [ ] Y    [ x] N    3.  PCP contacted at Discharge: [ ] Y    [ ] N    [ ] N/A  4.  Post-Discharge Appointment Date and Location:  5.  Summary of Handoff given to PCP:

## 2022-11-29 NOTE — ED ADULT NURSE NOTE - NSIMPLEMENTINTERV_GEN_ALL_ED
Implemented All Fall with Harm Risk Interventions:  [place] to call system. Call bell, personal items and telephone within reach. Instruct patient to call for assistance. Room bathroom lighting operational. Non-slip footwear when patient is off stretcher. Physically safe environment: no spills, clutter or unnecessary equipment. Stretcher in lowest position, wheels locked, appropriate side rails in place. Provide visual cue, wrist band, yellow gown, etc. Monitor gait and stability. Monitor for mental status changes and reorient to person, place, and time. Review medications for side effects contributing to fall risk. Reinforce activity limits and safety measures with patient and family. Provide visual clues: red socks.

## 2022-11-29 NOTE — H&P ADULT - ASSESSMENT
NIGHT HOSPITALIST:   NIGHT HOSPITALIST:   Presentation of patient with fever, cough, dyspnoea with oxygen requirements in the setting of patient who is UNVACCINATED against seasonal influenza and COVID-19--PCR assays sent--will continue with IV Rocephin and IV Zithromax with possibility of superimposed bacterial community acquired pneumonia pending blood cultures.  Patient does agree to Tamiflu if the flu swab is + and patient agrees to consideration of IV Decadron and IV remdesivir if the COVID-19 PCR is positive.  The patient is still reluctant to consider future consideration of vaccination.    Patient refuses CTT chest, with patient reports she had a CTT study 3 weeks ago>>would consider contacting Dr. Camargo or Dr. Lawrence above on the recent CTT chest.    Would also consider clarifying history of the undifferentiated thyroid nodules with patient's endocrinologist, Dr. REAGAN Simpson in the AM.    Upgraded to telemetry with prolonged QTc likely from the hypokalemia and hypomagnesemia.    Patient's clonidine and Coreg provided with patient's poorly controlled essential HTN.    Patient agrees to full liquids, nutrition consult.  FS S/S for now and would review patient's bedtime Lantus requirements.    Patient agrees to pharmacologic DVT prophylaxis.

## 2022-11-29 NOTE — H&P ADULT - NSHPADDITIONALINFOADULT_GEN_ALL_CORE
NIGHT HOSPITALIST:    Patient aware of course and agrees with plan/care as above.   The patient did not wish examiner to contact family at this hour.  Given patient's comorbidities, patient's long term prognosis is guarded.   Emotional support provided to patient.  Care reviewed with covering NP/PA for endorsement to Dr. Rivers.    Mathew Richardson MD  Available on [employer] Teams. NIGHT HOSPITALIST:    Patient aware of course and agrees with plan/care as above.   The patient did not wish examiner to contact family at this hour.  Given patient's comorbidities, patient's long term prognosis is guarded.   Emotional support provided to patient.  Care reviewed with covering NP/PALily for endorsement to Dr. Rivers.    Mathew Richardson MD  Available on [employer] Teams.

## 2022-11-29 NOTE — ED ADULT NURSE REASSESSMENT NOTE - NS ED NURSE REASSESS COMMENT FT1
Received report from JOHN Goetz. Patient a/ox4, lying in the stretcher. Patient endorses symptoms of nausea. Patient denies symptoms of CP, dyspnea, HA and blurry vision. Patient on 2LPM via NC, 97%. Patient on CM, NSR. Safety measures maintained, side rails intact and call-bell within reach.

## 2022-11-29 NOTE — PATIENT PROFILE ADULT - FALL HARM RISK - HARM RISK INTERVENTIONS
Assistance with ambulation/Assistance OOB with selected safe patient handling equipment/Communicate Risk of Fall with Harm to all staff/Discuss with provider need for PT consult/Monitor gait and stability/Reinforce activity limits and safety measures with patient and family/Tailored Fall Risk Interventions/Visual Cue: Yellow wristband and red socks/Bed in lowest position, wheels locked, appropriate side rails in place/Call bell, personal items and telephone in reach/Instruct patient to call for assistance before getting out of bed or chair/Non-slip footwear when patient is out of bed/[place] to call system/Physically safe environment - no spills, clutter or unnecessary equipment/Purposeful Proactive Rounding/Room/bathroom lighting operational, light cord in reach

## 2022-11-29 NOTE — H&P ADULT - PROBLEM SELECTOR PLAN 4
See above.  Undifferentiated nodules followed by Dr. Simpson--would consider clarifying history in the AM with endocrinologist.

## 2022-11-29 NOTE — H&P ADULT - NSHPREVIEWOFSYSTEMS_GEN_ALL_CORE
NO HA, no focal weakness.  NO chest pain/pressure.  NO palpitations.  NO abdominal pain, no red blood per rectum or melena.  NO back pain, no tearing back pain.  NO breast symptoms.    NO rash.  NO joint pain.  NO dysuria, no hematuria.  NO SI/HI.  NO thyroid symptoms  NO vaginal bleeding.

## 2022-11-29 NOTE — ED ADULT NURSE REASSESSMENT NOTE - NS ED NURSE REASSESS COMMENT FT1
ACP Fuadanow contacted at #[phone] regarding patient's elevated BP and symptoms of nausea. ACP to put in orders for Losartan, Tylenol and give ginger ale of nausea. RN pending new orders. To repeat BP in one hour.

## 2022-11-29 NOTE — H&P ADULT - HISTORY OF PRESENT ILLNESS
NIGHT HOSPITALIST:   Patient UNKNOWN to me previously, assigned to me at this point via the ER and by Dr. Rivers to admit this 78 y/o independent F--followed by her office physicians above--patient with a history of essential HTN maintained on Coreg, clonidine, Hyzaar 100-25, hyperlipidemia maintained on Lipitor, type 2 DM maintained on Tresiba (patient has not taken Rx due to decreased PO intake), history of RIGHT breast CA diagnosed with screening mammo in 2018, S/P RIGHT breast bx in July 2018 with ER+, [place]+ and HER2 negative, S/P tylectomy in July 2018 with LN dissection with anastrazole started in 2/2019 presumed to be in remission and since, S/P lung nodule resection for lung CA in 11/2015 [place] [facility] [place] presumed to be in remission, B/L knee replacement by Dr. PETTY Hidalgo in May 2019 at [place], undifferentiated multiple thyroid nodules followed by Dr. REAGAN Simpson above by endocrinology, UNVACCINATED against seasonal influenza and COVID-19 due to patient's concerns about past skin allergy (?), with  the patient self referring following 2 days of progressive cough with white sputum, fever, and dyspnoea following caring for a grandchild reportedly with the flu.  Patient with poor PO with myalgias and fever, chills,   NO diaphoresis.  No hemoptysis.  NO chest pain/pressure.  NO palpitations.    NIGHT HOSPITALIST:   Patient UNKNOWN to me previously, assigned to me at this point via the ER and by Dr. Rivers to admit this 78 y/o independent F--followed by her office physicians above--patient with a history of essential HTN maintained on Coreg, clonidine, Hyzaar 100-25, hyperlipidemia maintained on Lipitor, type 2 DM maintained on Tresiba (patient has not taken Rx due to decreased PO intake), history of RIGHT breast CA diagnosed with screening mammo in 2018, S/P RIGHT breast bx in July 2018 with ER+, [place]+ and HER2 negative, S/P tylectomy in July 2018 with LN dissection with anastrazole started in 2/2019 presumed to be in remission and since, S/P lung nodule resection for lung CA in 11/2015 [place]. [place] at [facility] [place] presumed to be in remission (had CTT chest at [facility] [place] ago), B/L knee replacement by Dr. PETTY Hidalgo in May 2019 at [place], undifferentiated multiple thyroid nodules followed by Dr. REAGAN Simpson above by endocrinology, UNVACCINATED against seasonal influenza and COVID-19 due to patient's concerns about past skin allergy (?), with  the patient self referring following 2 days of progressive cough with white sputum, fever, and dyspnoea following caring for a grandchild reportedly with the flu.  Patient with poor PO with myalgias and fever, chills,   NO diaphoresis.  No hemoptysis.  NO chest pain/pressure.  NO palpitations.    NIGHT HOSPITALIST:   Patient UNKNOWN to me previously, assigned to me at this point via the ER and by Dr. Rivers to admit this 78 y/o independent F--followed by her office physicians above--patient with a history of essential HTN maintained on Coreg, clonidine, Hyzaar 100-25, hyperlipidemia maintained on Lipitor, type 2 DM maintained on Tresiba (patient has not taken Rx due to decreased PO intake), history of RIGHT breast CA diagnosed with screening mammo in 2018, S/P RIGHT breast bx in July 2018 with ER+, [place]+ and HER2 negative, S/P tylectomy in July 2018 with LN dissection with anastrazole started in 2/2019 presumed to be in remission and since, S/P lung nodule resection for lung CA in 11/2015 [place]. [place] [facility] [place] presumed to be in remission (had [place] chest [place] [facility] [place] ago), B/L knee replacement by Dr. PETTY Hidalgo in May 2019 at [place], undifferentiated multiple thyroid nodules followed by Dr. REAGAN Simpson above by endocrinology, UNVACCINATED against seasonal influenza and COVID-19 due to patient's concerns about past skin allergy (?), with  the patient self referring following 2 days of progressive cough with white sputum, fever, and dyspnoea following caring for a grandchild reportedly with the flu.  Patient with poor PO with myalgias and fever, chills,   NO diaphoresis.  No hemoptysis.  NO chest pain/pressure.  NO palpitations.

## 2022-11-29 NOTE — ED PROVIDER NOTE - NSICDXPASTMEDICALHX_GEN_ALL_CORE_FT
PAST MEDICAL HISTORY:  Atrial Arrhythmia     Diabetes mellitus, type 2     Dyslipidemia     History of breast cancer right, 2018 treated with radiation    Hypertension     Lung cancer 2015, right lobe. treated with lobectomy    Obesity (BMI 30-39.9)     Obesity (BMI 30.0-34.9)     Onychomycosis of toenail 1st right toe    Osteoarthritis of both knees     Pedal edema     Urinary Urgency

## 2022-11-29 NOTE — ED PROVIDER NOTE - NS ED MD DISPO ISOLATION TYPES
How Severe Are Your Spot(S)?: mild What Type Of Note Output Would You Prefer (Optional)?: Bullet Format What Is The Reason For Today's Visit?: Full Body Skin Examination What Is The Reason For Today's Visit? (Being Monitored For X): concerning skin lesions on an annual basis Droplet

## 2022-11-29 NOTE — PATIENT PROFILE ADULT - NSPRESCRALCFREQ_GEN_A_NUR
Daily Note     Today's date: 2020  Patient name: Pradeep Interiano  : [id number]  MRN: [id number]  Referring provider: Frank Trammell Never

## 2022-11-29 NOTE — H&P ADULT - NSICDXPASTMEDICALHX_GEN_ALL_CORE_FT
PAST MEDICAL HISTORY:  Atrial Arrhythmia     COVID-19 vaccination refused     Diabetes mellitus, type 2     Dyslipidemia     History of breast cancer right, 2018 treated with radiation    Hypertension     Lung cancer 2015, right lobe. treated with lobectomy    Obesity (BMI 30-39.9)     Obesity (BMI 30.0-34.9)     Onychomycosis of toenail 1st right toe    Osteoarthritis of both knees     Pedal edema     Urinary Urgency

## 2022-11-30 DIAGNOSIS — J44.1 CHRONIC OBSTRUCTIVE PULMONARY DISEASE WITH (ACUTE) EXACERBATION: ICD-10-CM

## 2022-11-30 DIAGNOSIS — J10.1 INFLUENZA DUE TO OTHER IDENTIFIED INFLUENZA VIRUS WITH OTHER RESPIRATORY MANIFESTATIONS: ICD-10-CM

## 2022-11-30 DIAGNOSIS — R06.02 SHORTNESS OF BREATH: ICD-10-CM

## 2022-11-30 DIAGNOSIS — I10 ESSENTIAL (PRIMARY) HYPERTENSION: ICD-10-CM

## 2022-11-30 LAB
A1C WITH ESTIMATED AVERAGE GLUCOSE RESULT: 6.6 % — HIGH (ref 4–5.6)
A1C WITH ESTIMATED AVERAGE GLUCOSE RESULT: 6.7 % — HIGH (ref 4–5.6)
ALBUMIN SERPL ELPH-MCNC: 3.9 G/DL — SIGNIFICANT CHANGE UP (ref 3.3–5)
ALP SERPL-CCNC: 71 U/L — SIGNIFICANT CHANGE UP (ref 40–120)
ALT FLD-CCNC: 16 U/L — SIGNIFICANT CHANGE UP (ref 10–45)
ANION GAP SERPL CALC-SCNC: 8 MMOL/L — SIGNIFICANT CHANGE UP (ref 5–17)
AST SERPL-CCNC: 38 U/L — SIGNIFICANT CHANGE UP (ref 10–40)
BILIRUB SERPL-MCNC: 1.4 MG/DL — HIGH (ref 0.2–1.2)
BUN SERPL-MCNC: 16 MG/DL — SIGNIFICANT CHANGE UP (ref 7–23)
CALCIUM SERPL-MCNC: 8.5 MG/DL — SIGNIFICANT CHANGE UP (ref 8.4–10.5)
CHLORIDE SERPL-SCNC: 95 MMOL/L — LOW (ref 96–108)
CO2 SERPL-SCNC: 33 MMOL/L — HIGH (ref 22–31)
CREAT SERPL-MCNC: 0.89 MG/DL — SIGNIFICANT CHANGE UP (ref 0.5–1.3)
EGFR: 66 ML/MIN/1.73M2 — SIGNIFICANT CHANGE UP
ESTIMATED AVERAGE GLUCOSE: 143 MG/DL — HIGH (ref 68–114)
ESTIMATED AVERAGE GLUCOSE: 146 MG/DL — HIGH (ref 68–114)
GLUCOSE BLDC GLUCOMTR-MCNC: 136 MG/DL — HIGH (ref 70–99)
GLUCOSE BLDC GLUCOMTR-MCNC: 143 MG/DL — HIGH (ref 70–99)
GLUCOSE BLDC GLUCOMTR-MCNC: 152 MG/DL — HIGH (ref 70–99)
GLUCOSE BLDC GLUCOMTR-MCNC: 182 MG/DL — HIGH (ref 70–99)
GLUCOSE SERPL-MCNC: 155 MG/DL — HIGH (ref 70–99)
HCT VFR BLD CALC: 42.4 % — SIGNIFICANT CHANGE UP (ref 34.5–45)
HGB BLD-MCNC: 13.8 G/DL — SIGNIFICANT CHANGE UP (ref 11.5–15.5)
MAGNESIUM SERPL-MCNC: 2.3 MG/DL — SIGNIFICANT CHANGE UP (ref 1.6–2.6)
MCHC RBC-ENTMCNC: 26.3 PG — LOW (ref 27–34)
MCHC RBC-ENTMCNC: 32.5 GM/DL — SIGNIFICANT CHANGE UP (ref 32–36)
MCV RBC AUTO: 80.8 FL — SIGNIFICANT CHANGE UP (ref 80–100)
NRBC # BLD: 0 /100 WBCS — SIGNIFICANT CHANGE UP (ref 0–0)
PHOSPHATE SERPL-MCNC: 3.3 MG/DL — SIGNIFICANT CHANGE UP (ref 2.5–4.5)
PLATELET # BLD AUTO: 114 K/UL — LOW (ref 150–400)
POTASSIUM SERPL-MCNC: 3.8 MMOL/L — SIGNIFICANT CHANGE UP (ref 3.5–5.3)
POTASSIUM SERPL-SCNC: 3.8 MMOL/L — SIGNIFICANT CHANGE UP (ref 3.5–5.3)
PROT SERPL-MCNC: 6.5 G/DL — SIGNIFICANT CHANGE UP (ref 6–8.3)
RBC # BLD: 5.25 M/UL — HIGH (ref 3.8–5.2)
RBC # FLD: 13.2 % — SIGNIFICANT CHANGE UP (ref 10.3–14.5)
SODIUM SERPL-SCNC: 136 MMOL/L — SIGNIFICANT CHANGE UP (ref 135–145)
WBC # BLD: 6.52 K/UL — SIGNIFICANT CHANGE UP (ref 3.8–10.5)
WBC # FLD AUTO: 6.52 K/UL — SIGNIFICANT CHANGE UP (ref 3.8–10.5)

## 2022-11-30 RX ORDER — CALAMINE AND ZINC OXIDE AND PHENOL 160; 10 MG/ML; MG/ML
1 LOTION TOPICAL DAILY
Refills: 0 | Status: DISCONTINUED | OUTPATIENT
Start: 2022-11-30 | End: 2022-12-02

## 2022-11-30 RX ORDER — TIOTROPIUM BROMIDE 18 UG/1
2 CAPSULE ORAL; RESPIRATORY (INHALATION) DAILY
Refills: 0 | Status: DISCONTINUED | OUTPATIENT
Start: 2022-11-30 | End: 2022-12-02

## 2022-11-30 RX ORDER — POLYETHYLENE GLYCOL 3350 17 G/17G
17 POWDER, FOR SOLUTION ORAL DAILY
Refills: 0 | Status: DISCONTINUED | OUTPATIENT
Start: 2022-11-30 | End: 2022-12-02

## 2022-11-30 RX ORDER — SENNA PLUS 8.6 MG/1
2 TABLET ORAL AT BEDTIME
Refills: 0 | Status: DISCONTINUED | OUTPATIENT
Start: 2022-11-30 | End: 2022-12-02

## 2022-11-30 RX ORDER — BUDESONIDE AND FORMOTEROL FUMARATE DIHYDRATE 160; 4.5 UG/1; UG/1
2 AEROSOL RESPIRATORY (INHALATION)
Refills: 0 | Status: DISCONTINUED | OUTPATIENT
Start: 2022-11-30 | End: 2022-12-02

## 2022-11-30 RX ADMIN — CARVEDILOL PHOSPHATE 6.25 MILLIGRAM(S): 80 CAPSULE, EXTENDED RELEASE ORAL at 05:25

## 2022-11-30 RX ADMIN — Medication 1: at 12:17

## 2022-11-30 RX ADMIN — ALBUTEROL 2 PUFF(S): 90 AEROSOL, METERED ORAL at 05:26

## 2022-11-30 RX ADMIN — CALAMINE AND ZINC OXIDE AND PHENOL 1 APPLICATION(S): 160; 10 LOTION TOPICAL at 05:26

## 2022-11-30 RX ADMIN — Medication 2000 UNIT(S): at 12:17

## 2022-11-30 RX ADMIN — Medication 1: at 17:11

## 2022-11-30 RX ADMIN — Medication 0.3 MILLIGRAM(S): at 17:14

## 2022-11-30 RX ADMIN — BUDESONIDE AND FORMOTEROL FUMARATE DIHYDRATE 2 PUFF(S): 160; 4.5 AEROSOL RESPIRATORY (INHALATION) at 17:13

## 2022-11-30 RX ADMIN — ALBUTEROL 2 PUFF(S): 90 AEROSOL, METERED ORAL at 17:12

## 2022-11-30 RX ADMIN — ATORVASTATIN CALCIUM 20 MILLIGRAM(S): 80 TABLET, FILM COATED ORAL at 21:20

## 2022-11-30 RX ADMIN — Medication 0.3 MILLIGRAM(S): at 05:25

## 2022-11-30 RX ADMIN — Medication 650 MILLIGRAM(S): at 13:45

## 2022-11-30 RX ADMIN — Medication 650 MILLIGRAM(S): at 14:36

## 2022-11-30 RX ADMIN — Medication 75 MILLIGRAM(S): at 05:25

## 2022-11-30 RX ADMIN — LOSARTAN POTASSIUM 100 MILLIGRAM(S): 100 TABLET, FILM COATED ORAL at 05:26

## 2022-11-30 RX ADMIN — ANASTROZOLE 1 MILLIGRAM(S): 1 TABLET ORAL at 12:16

## 2022-11-30 RX ADMIN — Medication 81 MILLIGRAM(S): at 12:16

## 2022-11-30 RX ADMIN — PANTOPRAZOLE SODIUM 40 MILLIGRAM(S): 20 TABLET, DELAYED RELEASE ORAL at 05:28

## 2022-11-30 RX ADMIN — Medication 75 MILLIGRAM(S): at 17:13

## 2022-11-30 RX ADMIN — Medication 1200 MILLIGRAM(S): at 17:15

## 2022-11-30 RX ADMIN — ALBUTEROL 2 PUFF(S): 90 AEROSOL, METERED ORAL at 12:16

## 2022-11-30 RX ADMIN — ENOXAPARIN SODIUM 40 MILLIGRAM(S): 100 INJECTION SUBCUTANEOUS at 17:13

## 2022-11-30 RX ADMIN — CARVEDILOL PHOSPHATE 6.25 MILLIGRAM(S): 80 CAPSULE, EXTENDED RELEASE ORAL at 17:14

## 2022-11-30 RX ADMIN — AZITHROMYCIN 255 MILLIGRAM(S): 500 TABLET, FILM COATED ORAL at 13:49

## 2022-11-30 RX ADMIN — SENNA PLUS 2 TABLET(S): 8.6 TABLET ORAL at 21:19

## 2022-11-30 RX ADMIN — Medication 0.1 MILLIGRAM(S): at 00:16

## 2022-11-30 NOTE — CONSULT NOTE ADULT - PROBLEM SELECTOR RECOMMENDATION 5
Per hx  -s/p R lobectomy in 2015 @ [facility] [place] Per hx  -S/p radiation, lumpectomy with LN dissection in 2019

## 2022-11-30 NOTE — CONSULT NOTE ADULT - PROBLEM SELECTOR RECOMMENDATION 4
Per hx  -S/p radiation, lumpectomy with LN dissection in 2019 Elevated BP on admission  -Per primary team, c/w home meds.

## 2022-11-30 NOTE — CONSULT NOTE ADULT - PROBLEM SELECTOR RECOMMENDATION 7
CXR grossly clear  -Flu A +  -F/u CT chest. CXR grossly clear  -Flu A +  -Suggest monitor off ABX  -Procal in AM.

## 2022-11-30 NOTE — CONSULT NOTE ADULT - PROBLEM SELECTOR RECOMMENDATION 9
Likely 2nd to Flu A as above  -CXR grossly clear, no PNA  -Keep sats >90% with O2 PRN (currently 2LNC). Wean O2 as tolerated. RVP + Flu A  -CXR grossly clear, no clear PNA  -C/w Tamiflu  -Supportive care  -ABX per primary team, f/u CT chest to r/o PNA. RVP + Flu A  -CXR grossly clear, no clear PNA  -C/w Tamiflu  -Supportive care.

## 2022-11-30 NOTE — CONSULT NOTE ADULT - SUBJECTIVE AND OBJECTIVE BOX
PULMONARY CONSULT    HPI: 76 y/o F with PMH of HTN, HLD, DM, R breast CA (s/p lumpectomy with LN dissection in 2019), lung CA (s/p R lobectomy 2015 @ [facility] [place] thyroid nodules. Presents with cough, subjective fevers, and SOB x2 days, also noted to be caring for grandchild with the flu. RVP + for Flu A on admission. Low grade temp of 99.8 F on admission, sat 90% on RA. Also hypertensive, with SBP >200. CXR grossly clear. Pulmonary called to consult for SOB 2nd to Flu A.       PAST MEDICAL & SURGICAL HISTORY:  Hypertension  Atrial Arrhythmia  Urinary Urgency  Obesity (BMI 30-39.9)  Diabetes mellitus, type 2  Dyslipidemia  History of breast cancer  right, 2018 treated with radiation  Lung cancer  2015, right lobe. treated with lobectomy  Osteoarthritis of both knees  Obesity (BMI 30.0-34.9)  Onychomycosis of toenail  Pedal edema        Allergies  No Known Drug Allergies  shellfish (Vomiting)    FAMILY HISTORY:  Family history of breast cancer (Mother, Sibling)  Family history of heart attack (Father)    Social history:     Review of Systems:  CONSTITUTIONAL: No fever, chills, or fatigue  EYES: No eye pain, visual disturbances, or discharge  ENMT:  No difficulty hearing, tinnitus, vertigo; No sinus or throat pain  NECK: No pain or stiffness  RESPIRATORY: Per above  CARDIOVASCULAR: No chest pain, palpitations, dizziness, or leg swelling  GASTROINTESTINAL: No abdominal or epigastric pain. No nausea, vomiting, or hematemesis; No diarrhea or constipation. No melena or hematochezia.  GENITOURINARY: No dysuria, frequency, hematuria, or incontinence  NEUROLOGICAL: No headaches, memory loss, loss of strength, numbness, or tremors  SKIN: No itching, burning, rashes, or lesions   MUSCULOSKELETAL: No joint pain or swelling; No muscle, back, or extremity pain  PSYCHIATRIC: No depression, anxiety, mood swings, or difficulty sleeping    Medications:  MEDICATIONS  (STANDING):  albuterol    90 MICROgram(s) HFA Inhaler 2 Puff(s) Inhalation every 6 hours  anastrozole 1 milliGRAM(s) Oral daily  aspirin enteric coated 81 milliGRAM(s) Oral daily  atorvastatin 20 milliGRAM(s) Oral at bedtime  azithromycin  IVPB 500 milliGRAM(s) IV Intermittent every 24 hours  carvedilol 6.25 milliGRAM(s) Oral <User Schedule>  cefTRIAXone   IVPB 1000 milliGRAM(s) IV Intermittent every 24 hours  cholecalciferol 2000 Unit(s) Oral daily  cloNIDine 0.3 milliGRAM(s) Oral two times a day  dextrose 5%. 1000 milliLiter(s) (50 mL/Hr) IV Continuous <Continuous>  dextrose 5%. 1000 milliLiter(s) (100 mL/Hr) IV Continuous <Continuous>  dextrose 50% Injectable 25 Gram(s) IV Push once  dextrose 50% Injectable 12.5 Gram(s) IV Push once  dextrose 50% Injectable 25 Gram(s) IV Push once  enoxaparin Injectable 40 milliGRAM(s) SubCutaneous every 24 hours  glucagon  Injectable 1 milliGRAM(s) IntraMuscular once  insulin lispro (ADMELOG) corrective regimen sliding scale   SubCutaneous three times a day before meals  insulin lispro (ADMELOG) corrective regimen sliding scale   SubCutaneous at bedtime  losartan 100 milliGRAM(s) Oral daily  oseltamivir 75 milliGRAM(s) Oral two times a day  pantoprazole    Tablet 40 milliGRAM(s) Oral before breakfast    MEDICATIONS  (PRN):  acetaminophen     Tablet .. 650 milliGRAM(s) Oral every 6 hours PRN Temp greater or equal to 38C (100.4F), Mild Pain (1 - 3)  calamine/zinc oxide Lotion 1 Application(s) Topical daily PRN Rash and/or Itching  dextrose Oral Gel 15 Gram(s) Oral once PRN Blood Glucose LESS THAN 70 milliGRAM(s)/deciliter    Vital Signs Last 24 Hrs  T(C): 36.8 (30 Nov 2022 05:15), Max: 38.3 (29 Nov 2022 20:00)  T(F): 98.3 (30 Nov 2022 05:15), Max: 101 (29 Nov 2022 20:00)  HR: 93 (30 Nov 2022 05:15) (78 - 100)  BP: 184/87 (30 Nov 2022 05:15) (180/110 - 225/99)  BP(mean): --  RR: 18 (30 Nov 2022 05:15) (18 - 24)  SpO2: 97% (30 Nov 2022 05:15) (90% - 98%)    Parameters below as of 30 Nov 2022 05:15  Patient On (Oxygen Delivery Method): nasal cannula  O2 Flow (L/min): 2    VBG pH 7.45 11-29 @ 12:40  VBG pCO2 50 11-29 @ 12:40  VBG O2 sat 80.3 11-29 @ 12:40  VBG lactate 1.6 11-29 @ 12:40    11-29 @ 07:01  -  11-30 @ 07:00  --------------------------------------------------------  IN: 120 mL / OUT: 0 mL / NET: 120 mL      LABS:                        13.8   6.52  )-----------( 114      ( 30 Nov 2022 07:42 )             42.4     11-30    136  |  95<L>  |  16  ----------------------------<  155<H>  3.8   |  33<H>  |  0.89    Ca    8.5      30 Nov 2022 07:42  Phos  3.3     11-30  Mg     2.3     11-30    TPro  6.5  /  Alb  3.9  /  TBili  1.4<H>  /  DBili  x   /  AST  38  /  ALT  16  /  AlkPhos  71  11-30    CAPILLARY BLOOD GLUCOSE  POCT Blood Glucose.: 136 mg/dL (30 Nov 2022 07:45)    Physical Examination:    General: No acute distress.      HEENT: Pupils equal, reactive to light.  Symmetric.    PULM: Clear to auscultation bilaterally, no significant sputum production    CVS: S1, S2    ABD: Soft, nondistended, nontender, normoactive bowel sounds, no masses    EXT: No edema, nontender    SKIN: Warm and well perfused, no rashes noted.    NEURO: Alert, oriented, interactive, nonfocal    RADIOLOGY REVIEWED  CXR: grossly clear      PULMONARY CONSULT    HPI: 76 y/o F with PMH of HTN, HLD, DM, R breast CA (s/p lumpectomy with LN dissection in 2019), [place] [facility] [place]. Presents with cough, subjective fevers, and SOB x2 days, also noted to be caring for grandchild with the flu. RVP + for Flu A on admission. Low grade temp of 99.8 F on admission, sat 90% on RA. Also hypertensive, with SBP >200. CXR grossly clear. Pulmonary called to consult for SOB 2nd to Flu A. Patient reports being a former smoker, hx of COPD not on home O2, sees Dr. Ignacio as an outpatient. +Congested cough, mild dyspnea primarily with exertion. Denies CP, fever, pleuritic chest pain. O2 sats 94% on 2LNC.       PAST MEDICAL & SURGICAL HISTORY:  Hypertension  Atrial Arrhythmia  Urinary Urgency  Obesity (BMI 30-39.9)  Diabetes mellitus, type 2  Dyslipidemia  History of breast cancer  right, 2018 treated with radiation  Lung cancer  2015, right lobe. treated with lobectomy  Osteoarthritis of both knees  Obesity (BMI 30.0-34.9)  Onychomycosis of toenail  Pedal edema      Allergies  No Known Drug Allergies  shellfish (Vomiting)    FAMILY HISTORY:  Family history of breast cancer (Mother, Sibling)  Family history of heart attack (Father)    Social history: former smoker    Review of Systems:  CONSTITUTIONAL: fever, chills  EYES: No eye pain, visual disturbances, or discharge  ENMT:  No difficulty hearing, tinnitus, vertigo; No sinus or throat pain  NECK: No pain or stiffness  RESPIRATORY: Per above  CARDIOVASCULAR: No chest pain, palpitations, dizziness, or leg swelling  GASTROINTESTINAL: No abdominal or epigastric pain. No nausea, vomiting, or hematemesis; No diarrhea or constipation. No melena or hematochezia.  GENITOURINARY: No dysuria, frequency, hematuria, or incontinence  NEUROLOGICAL: No headaches, memory loss, loss of strength, numbness, or tremors  SKIN: No itching, burning, rashes, or lesions   MUSCULOSKELETAL: No joint pain or swelling; No muscle, back, or extremity pain  PSYCHIATRIC: No depression, anxiety, mood swings, or difficulty sleeping    Medications:  MEDICATIONS  (STANDING):  albuterol    90 MICROgram(s) HFA Inhaler 2 Puff(s) Inhalation every 6 hours  anastrozole 1 milliGRAM(s) Oral daily  aspirin enteric coated 81 milliGRAM(s) Oral daily  atorvastatin 20 milliGRAM(s) Oral at bedtime  azithromycin  IVPB 500 milliGRAM(s) IV Intermittent every 24 hours  carvedilol 6.25 milliGRAM(s) Oral <User Schedule>  cefTRIAXone   IVPB 1000 milliGRAM(s) IV Intermittent every 24 hours  cholecalciferol 2000 Unit(s) Oral daily  cloNIDine 0.3 milliGRAM(s) Oral two times a day  dextrose 5%. 1000 milliLiter(s) (50 mL/Hr) IV Continuous <Continuous>  dextrose 5%. 1000 milliLiter(s) (100 mL/Hr) IV Continuous <Continuous>  dextrose 50% Injectable 25 Gram(s) IV Push once  dextrose 50% Injectable 12.5 Gram(s) IV Push once  dextrose 50% Injectable 25 Gram(s) IV Push once  enoxaparin Injectable 40 milliGRAM(s) SubCutaneous every 24 hours  glucagon  Injectable 1 milliGRAM(s) IntraMuscular once  insulin lispro (ADMELOG) corrective regimen sliding scale   SubCutaneous three times a day before meals  insulin lispro (ADMELOG) corrective regimen sliding scale   SubCutaneous at bedtime  losartan 100 milliGRAM(s) Oral daily  oseltamivir 75 milliGRAM(s) Oral two times a day  pantoprazole    Tablet 40 milliGRAM(s) Oral before breakfast    MEDICATIONS  (PRN):  acetaminophen     Tablet .. 650 milliGRAM(s) Oral every 6 hours PRN Temp greater or equal to 38C (100.4F), Mild Pain (1 - 3)  calamine/zinc oxide Lotion 1 Application(s) Topical daily PRN Rash and/or Itching  dextrose Oral Gel 15 Gram(s) Oral once PRN Blood Glucose LESS THAN 70 milliGRAM(s)/deciliter    Vital Signs Last 24 Hrs  T(C): 36.8 (30 Nov 2022 05:15), Max: 38.3 (29 Nov 2022 20:00)  T(F): 98.3 (30 Nov 2022 05:15), Max: 101 (29 Nov 2022 20:00)  HR: 93 (30 Nov 2022 05:15) (78 - 100)  BP: 184/87 (30 Nov 2022 05:15) (180/110 - 225/99)  BP(mean): --  RR: 18 (30 Nov 2022 05:15) (18 - 24)  SpO2: 97% (30 Nov 2022 05:15) (90% - 98%)    Parameters below as of 30 Nov 2022 05:15  Patient On (Oxygen Delivery Method): nasal cannula  O2 Flow (L/min): 2    VBG pH 7.45 11-29 @ 12:40  VBG pCO2 50 11-29 @ 12:40  VBG O2 sat 80.3 11-29 @ 12:40  VBG lactate 1.6 11-29 @ 12:40    11-29 @ 07:01  -  11-30 @ 07:00  --------------------------------------------------------  IN: 120 mL / OUT: 0 mL / NET: 120 mL      LABS:                        13.8   6.52  )-----------( 114      ( 30 Nov 2022 07:42 )             42.4     11-30    136  |  95<L>  |  16  ----------------------------<  155<H>  3.8   |  33<H>  |  0.89    Ca    8.5      30 Nov 2022 07:42  Phos  3.3     11-30  Mg     2.3     11-30    TPro  6.5  /  Alb  3.9  /  TBili  1.4<H>  /  DBili  x   /  AST  38  /  ALT  16  /  AlkPhos  71  11-30    CAPILLARY BLOOD GLUCOSE  POCT Blood Glucose.: 136 mg/dL (30 Nov 2022 07:45)    Physical Examination:    General: No acute distress.      HEENT: Pupils equal, reactive to light.  Symmetric.    PULM: B/l rhonchi     CVS: S1, S2    ABD: Soft, nondistended, nontender, normoactive bowel sounds, no masses    EXT: No edema, nontender    SKIN: Warm and well perfused, no rashes noted.    NEURO: Alert, oriented, interactive, nonfocal    RADIOLOGY REVIEWED  CXR: grossly clear

## 2022-11-30 NOTE — CONSULT NOTE ADULT - PROBLEM SELECTOR RECOMMENDATION 2
RVP + Flu A  -CXR grossly clear, no PNA  -Suggest start Tamiflu  -Can likely monitor off ABX from a pulmonary perspective  -Supportive care. 2nd to Flu A as above  -Not on home O2  -Start Symbicort 160/4.5 mcg 2 puffs BID, Spiriva 18 mcg 1 capsule inhaled daily (home med Breztri, can resume on discharge)  -Duoneb q6h  -Mucinex 1200 mg PO BID x5 days  -Check CT chest  -F/u with Dr. Ignacio on discharge. 2nd to Flu A as above  -Not on home O2  -Start Symbicort 160/4.5 mcg 2 puffs BID (home med Breztri, can resume on discharge)  -Duoneb q6h  -Mucinex 1200 mg PO BID x5 days  -Check CT chest  -F/u with Dr. Ignacio on discharge. 2nd to Flu A as above  -Not on home O2  -Start Symbicort 160/4.5 mcg 2 puffs BID (home med Breztri, can resume on discharge), Spiriva   -Duoneb q6h  -Mucinex 1200 mg PO BID x5 days  -F/u with Dr. Ignacio on discharge. 2nd to Flu A as above  -Not on home O2  -Start Symbicort 160/4.5 mcg 2 puffs BID, Spiriva. Home med Breztri, can resume on discharge   -Duoneb q6h  -Mucinex 1200 mg PO BID x5 days  -F/u with Dr. Ignacio on discharge.

## 2022-11-30 NOTE — CONSULT NOTE ADULT - PROBLEM SELECTOR RECOMMENDATION 3
Elevated BP on admission  -Per primary team, c/w home meds. Likely 2nd to exacerbation of COPD, Flu A as above  -CXR grossly clear  -Keep sats >90% with O2 PRN (currently 2LNC). Wean O2 as tolerated  -Bronchodilators as above  -F/u CT chest. Likely 2nd to exacerbation of COPD, Flu A as above  -CXR grossly clear  -Keep sats >90% with O2 PRN (currently 2LNC). Wean O2 as tolerated  -Bronchodilators as above

## 2022-12-01 LAB
ANION GAP SERPL CALC-SCNC: 14 MMOL/L — SIGNIFICANT CHANGE UP (ref 5–17)
BUN SERPL-MCNC: 14 MG/DL — SIGNIFICANT CHANGE UP (ref 7–23)
CALCIUM SERPL-MCNC: 8.4 MG/DL — SIGNIFICANT CHANGE UP (ref 8.4–10.5)
CHLORIDE SERPL-SCNC: 94 MMOL/L — LOW (ref 96–108)
CO2 SERPL-SCNC: 30 MMOL/L — SIGNIFICANT CHANGE UP (ref 22–31)
CREAT SERPL-MCNC: 0.76 MG/DL — SIGNIFICANT CHANGE UP (ref 0.5–1.3)
EGFR: 80 ML/MIN/1.73M2 — SIGNIFICANT CHANGE UP
GLUCOSE BLDC GLUCOMTR-MCNC: 151 MG/DL — HIGH (ref 70–99)
GLUCOSE BLDC GLUCOMTR-MCNC: 176 MG/DL — HIGH (ref 70–99)
GLUCOSE BLDC GLUCOMTR-MCNC: 202 MG/DL — HIGH (ref 70–99)
GLUCOSE BLDC GLUCOMTR-MCNC: 223 MG/DL — HIGH (ref 70–99)
GLUCOSE SERPL-MCNC: 119 MG/DL — HIGH (ref 70–99)
LEGIONELLA AG UR QL: NEGATIVE — SIGNIFICANT CHANGE UP
MAGNESIUM SERPL-MCNC: 2.1 MG/DL — SIGNIFICANT CHANGE UP (ref 1.6–2.6)
PHOSPHATE SERPL-MCNC: 2 MG/DL — LOW (ref 2.5–4.5)
POTASSIUM SERPL-MCNC: 3.2 MMOL/L — LOW (ref 3.5–5.3)
POTASSIUM SERPL-SCNC: 3.2 MMOL/L — LOW (ref 3.5–5.3)
PROCALCITONIN SERPL-MCNC: 0.11 NG/ML — HIGH (ref 0.02–0.1)
SODIUM SERPL-SCNC: 138 MMOL/L — SIGNIFICANT CHANGE UP (ref 135–145)

## 2022-12-01 RX ORDER — POTASSIUM CHLORIDE 20 MEQ
20 PACKET (EA) ORAL
Refills: 0 | Status: COMPLETED | OUTPATIENT
Start: 2022-12-01 | End: 2022-12-01

## 2022-12-01 RX ORDER — HYDRALAZINE HCL 50 MG
10 TABLET ORAL THREE TIMES A DAY
Refills: 0 | Status: DISCONTINUED | OUTPATIENT
Start: 2022-12-01 | End: 2022-12-02

## 2022-12-01 RX ORDER — SODIUM,POTASSIUM PHOSPHATES 278-250MG
1 POWDER IN PACKET (EA) ORAL ONCE
Refills: 0 | Status: COMPLETED | OUTPATIENT
Start: 2022-12-01 | End: 2022-12-01

## 2022-12-01 RX ADMIN — Medication 1200 MILLIGRAM(S): at 18:08

## 2022-12-01 RX ADMIN — BUDESONIDE AND FORMOTEROL FUMARATE DIHYDRATE 2 PUFF(S): 160; 4.5 AEROSOL RESPIRATORY (INHALATION) at 05:29

## 2022-12-01 RX ADMIN — ANASTROZOLE 1 MILLIGRAM(S): 1 TABLET ORAL at 12:35

## 2022-12-01 RX ADMIN — ATORVASTATIN CALCIUM 20 MILLIGRAM(S): 80 TABLET, FILM COATED ORAL at 21:26

## 2022-12-01 RX ADMIN — ALBUTEROL 2 PUFF(S): 90 AEROSOL, METERED ORAL at 00:30

## 2022-12-01 RX ADMIN — Medication 75 MILLIGRAM(S): at 05:26

## 2022-12-01 RX ADMIN — ALBUTEROL 2 PUFF(S): 90 AEROSOL, METERED ORAL at 19:09

## 2022-12-01 RX ADMIN — Medication 0.3 MILLIGRAM(S): at 05:27

## 2022-12-01 RX ADMIN — Medication 2: at 12:35

## 2022-12-01 RX ADMIN — ENOXAPARIN SODIUM 40 MILLIGRAM(S): 100 INJECTION SUBCUTANEOUS at 18:08

## 2022-12-01 RX ADMIN — Medication 20 MILLIEQUIVALENT(S): at 16:51

## 2022-12-01 RX ADMIN — Medication 10 MILLIGRAM(S): at 21:26

## 2022-12-01 RX ADMIN — PANTOPRAZOLE SODIUM 40 MILLIGRAM(S): 20 TABLET, DELAYED RELEASE ORAL at 08:07

## 2022-12-01 RX ADMIN — Medication 20 MILLIEQUIVALENT(S): at 12:34

## 2022-12-01 RX ADMIN — LOSARTAN POTASSIUM 100 MILLIGRAM(S): 100 TABLET, FILM COATED ORAL at 05:27

## 2022-12-01 RX ADMIN — Medication 20 MILLIEQUIVALENT(S): at 18:07

## 2022-12-01 RX ADMIN — Medication 2000 UNIT(S): at 12:41

## 2022-12-01 RX ADMIN — Medication 81 MILLIGRAM(S): at 12:34

## 2022-12-01 RX ADMIN — TIOTROPIUM BROMIDE 2 PUFF(S): 18 CAPSULE ORAL; RESPIRATORY (INHALATION) at 19:08

## 2022-12-01 RX ADMIN — ALBUTEROL 2 PUFF(S): 90 AEROSOL, METERED ORAL at 05:28

## 2022-12-01 RX ADMIN — Medication 1200 MILLIGRAM(S): at 05:28

## 2022-12-01 RX ADMIN — POLYETHYLENE GLYCOL 3350 17 GRAM(S): 17 POWDER, FOR SOLUTION ORAL at 12:35

## 2022-12-01 RX ADMIN — ALBUTEROL 2 PUFF(S): 90 AEROSOL, METERED ORAL at 12:35

## 2022-12-01 RX ADMIN — CARVEDILOL PHOSPHATE 6.25 MILLIGRAM(S): 80 CAPSULE, EXTENDED RELEASE ORAL at 05:28

## 2022-12-01 RX ADMIN — Medication 1 PACKET(S): at 12:34

## 2022-12-01 RX ADMIN — Medication 0.3 MILLIGRAM(S): at 21:27

## 2022-12-01 RX ADMIN — SENNA PLUS 2 TABLET(S): 8.6 TABLET ORAL at 21:26

## 2022-12-01 RX ADMIN — Medication 1: at 19:07

## 2022-12-01 RX ADMIN — BUDESONIDE AND FORMOTEROL FUMARATE DIHYDRATE 2 PUFF(S): 160; 4.5 AEROSOL RESPIRATORY (INHALATION) at 18:08

## 2022-12-01 RX ADMIN — CARVEDILOL PHOSPHATE 6.25 MILLIGRAM(S): 80 CAPSULE, EXTENDED RELEASE ORAL at 18:08

## 2022-12-01 RX ADMIN — Medication 10 MILLIGRAM(S): at 12:34

## 2022-12-01 RX ADMIN — Medication 0.3 MILLIGRAM(S): at 12:35

## 2022-12-01 RX ADMIN — Medication 75 MILLIGRAM(S): at 18:08

## 2022-12-01 RX ADMIN — Medication 1: at 08:02

## 2022-12-01 NOTE — DIETITIAN INITIAL EVALUATION ADULT - REASON FOR ADMISSION
Acute respiratory failure with hypoxia    Chart reviewed, events noted. This is a "76 y/o F with PMH of HTN, HLD, DM, R breast CA (s/p lumpectomy with LN dissection in 2019), lung CA (s/p R [place] @ [facility] [place] nodules. Presents with cough, subjective fevers, and SOB x2 days, also noted to be caring for grandchild with the flu. RVP + for Flu A on admission. Low grade temp of 99.8 F on admission, sat 90% on RA. Also hypertensive, with SBP >200. CXR grossly clear. Pulmonary called to consult for SOB 2nd to Flu A. Patient reports being a former smoker, hx of COPD not on home O2"

## 2022-12-01 NOTE — DIETITIAN INITIAL EVALUATION ADULT - OTHER INFO
Weight: pt reports UBW as ~180lbs x 3-4 years ago, endorses ~ 30-40lbs gradual weight loss over the last few years. Attributes this to decreasing appetite. States current weight has been stable ~ 150's. Current dosing weight is 155lbs.

## 2022-12-01 NOTE — DIETITIAN INITIAL EVALUATION ADULT - ORAL INTAKE PTA/DIET HISTORY
Pt denies any recent changes in PO intake and appetite PTA, reports she is not a big eater at baseline. Confirms allergy to shellfish. Follows Kosher dietary laws. Pt denies chewing/swallowing difficulty, nausea, vomiting, diarrhea, reports chronic constipation at home, takes prune juice or laxative as needed.   Pt denies any recent changes in PO intake and appetite PTA, reports she is not a big eater at baseline but does note the last few days she has been eating less due to feeling unwell.  Confirms allergy to shellfish. Follows Kosher dietary laws. Pt denies chewing/swallowing difficulty, nausea, vomiting, diarrhea, reports chronic constipation at home, takes prune juice or laxative as needed.

## 2022-12-01 NOTE — DIETITIAN INITIAL EVALUATION ADULT - PERTINENT LABORATORY DATA
12-01    138  |  94<L>  |  14  ----------------------------<  119<H>  3.2<L>   |  30  |  0.76    Ca    8.4      01 Dec 2022 08:55  Phos  2.0     12-01  Mg     2.1     12-01    TPro  6.5  /  Alb  3.9  /  TBili  1.4<H>  /  DBili  x   /  AST  38  /  ALT  16  /  AlkPhos  71  11-30  POCT Blood Glucose.: 151 mg/dL (12-01-22 @ 07:48)  A1C with Estimated Average Glucose Result: 6.7 % (11-30-22 @ 07:52)  A1C with Estimated Average Glucose Result: 6.6 % (11-29-22 @ 13:14)

## 2022-12-01 NOTE — DIETITIAN INITIAL EVALUATION ADULT - NSFNSADHERENCEPTAFT_GEN_A_CORE
Pt with T2DM, takes Tresiba and Humalog. Has continuous glucose monitor, reports numbers usually 150-180mg/dL. Hba1c: 6.7% (11/30)--good glycemic control.

## 2022-12-01 NOTE — DIETITIAN INITIAL EVALUATION ADULT - NS FNS DIET ORDER
Diet, Consistent Carbohydrate Full Liquid:   DASH/TLC {Sodium & Cholesterol Restricted} (DASH)  Kosher  No Shellfish (11-29-22 @ 17:18) [Active]

## 2022-12-01 NOTE — DIETITIAN INITIAL EVALUATION ADULT - PERTINENT MEDS FT
MEDICATIONS  (STANDING):  albuterol    90 MICROgram(s) HFA Inhaler 2 Puff(s) Inhalation every 6 hours  anastrozole 1 milliGRAM(s) Oral daily  aspirin enteric coated 81 milliGRAM(s) Oral daily  atorvastatin 20 milliGRAM(s) Oral at bedtime  budesonide 160 MICROgram(s)/formoterol 4.5 MICROgram(s) Inhaler 2 Puff(s) Inhalation two times a day  carvedilol 6.25 milliGRAM(s) Oral <User Schedule>  cholecalciferol 2000 Unit(s) Oral daily  cloNIDine 0.3 milliGRAM(s) Oral three times a day  dextrose 5%. 1000 milliLiter(s) (50 mL/Hr) IV Continuous <Continuous>  dextrose 5%. 1000 milliLiter(s) (100 mL/Hr) IV Continuous <Continuous>  dextrose 50% Injectable 25 Gram(s) IV Push once  dextrose 50% Injectable 12.5 Gram(s) IV Push once  dextrose 50% Injectable 25 Gram(s) IV Push once  enoxaparin Injectable 40 milliGRAM(s) SubCutaneous every 24 hours  glucagon  Injectable 1 milliGRAM(s) IntraMuscular once  guaiFENesin ER 1200 milliGRAM(s) Oral every 12 hours  hydrALAZINE 10 milliGRAM(s) Oral three times a day  insulin lispro (ADMELOG) corrective regimen sliding scale   SubCutaneous three times a day before meals  insulin lispro (ADMELOG) corrective regimen sliding scale   SubCutaneous at bedtime  losartan 100 milliGRAM(s) Oral daily  oseltamivir 75 milliGRAM(s) Oral two times a day  pantoprazole    Tablet 40 milliGRAM(s) Oral before breakfast  polyethylene glycol 3350 17 Gram(s) Oral daily  senna 2 Tablet(s) Oral at bedtime  tiotropium 2.5 MICROgram(s) Inhaler 2 Puff(s) Inhalation daily    MEDICATIONS  (PRN):  acetaminophen     Tablet .. 650 milliGRAM(s) Oral every 6 hours PRN Temp greater or equal to 38C (100.4F), Mild Pain (1 - 3)  calamine/zinc oxide Lotion 1 Application(s) Topical daily PRN Rash and/or Itching  dextrose Oral Gel 15 Gram(s) Oral once PRN Blood Glucose LESS THAN 70 milliGRAM(s)/deciliter

## 2022-12-01 NOTE — DIETITIAN INITIAL EVALUATION ADULT - ENERGY INTAKE
Pt currently on carbohydrate consistent full liquid diet. Reports consuming 100% of oatmeal this morning but states appetite is fair. No nausea emesis. Pt report good tolerance to diet but requesting diet upgrade, asking for fruit and potatoes. Discussed importance of protein intake, pt declining nutrition supplement at this time, is amenable to yogurt with meals. Reports last BM x 4 days ago.  Fair (50-75%)

## 2022-12-01 NOTE — DIETITIAN INITIAL EVALUATION ADULT - ADD RECOMMEND
1) Defer PO diet advancement to team. Recommend regular carbohydrate consistent diet. 2) Encourage PO intake of protein-rich foods, pt currently declining nutrition oral supplements. 3) RD to remain available and follow-up as medically appropriate.

## 2022-12-02 ENCOUNTER — TRANSCRIPTION ENCOUNTER (OUTPATIENT)
Age: 77
End: 2022-12-02

## 2022-12-02 VITALS
HEART RATE: 69 BPM | TEMPERATURE: 98 F | SYSTOLIC BLOOD PRESSURE: 139 MMHG | DIASTOLIC BLOOD PRESSURE: 76 MMHG | RESPIRATION RATE: 18 BRPM | OXYGEN SATURATION: 94 %

## 2022-12-02 LAB
ANION GAP SERPL CALC-SCNC: 6 MMOL/L — SIGNIFICANT CHANGE UP (ref 5–17)
BUN SERPL-MCNC: 16 MG/DL — SIGNIFICANT CHANGE UP (ref 7–23)
CALCIUM SERPL-MCNC: 8.6 MG/DL — SIGNIFICANT CHANGE UP (ref 8.4–10.5)
CHLORIDE SERPL-SCNC: 96 MMOL/L — SIGNIFICANT CHANGE UP (ref 96–108)
CO2 SERPL-SCNC: 33 MMOL/L — HIGH (ref 22–31)
CREAT SERPL-MCNC: 0.87 MG/DL — SIGNIFICANT CHANGE UP (ref 0.5–1.3)
CULTURE RESULTS: NO GROWTH — SIGNIFICANT CHANGE UP
EGFR: 68 ML/MIN/1.73M2 — SIGNIFICANT CHANGE UP
GLUCOSE BLDC GLUCOMTR-MCNC: 135 MG/DL — HIGH (ref 70–99)
GLUCOSE BLDC GLUCOMTR-MCNC: 253 MG/DL — HIGH (ref 70–99)
GLUCOSE SERPL-MCNC: 142 MG/DL — HIGH (ref 70–99)
POTASSIUM SERPL-MCNC: 4.5 MMOL/L — SIGNIFICANT CHANGE UP (ref 3.5–5.3)
POTASSIUM SERPL-SCNC: 4.5 MMOL/L — SIGNIFICANT CHANGE UP (ref 3.5–5.3)
SODIUM SERPL-SCNC: 135 MMOL/L — SIGNIFICANT CHANGE UP (ref 135–145)
SPECIMEN SOURCE: SIGNIFICANT CHANGE UP

## 2022-12-02 PROCEDURE — 97161 PT EVAL LOW COMPLEX 20 MIN: CPT

## 2022-12-02 PROCEDURE — 82947 ASSAY GLUCOSE BLOOD QUANT: CPT

## 2022-12-02 PROCEDURE — 84145 PROCALCITONIN (PCT): CPT

## 2022-12-02 PROCEDURE — 36415 COLL VENOUS BLD VENIPUNCTURE: CPT

## 2022-12-02 PROCEDURE — 71045 X-RAY EXAM CHEST 1 VIEW: CPT

## 2022-12-02 PROCEDURE — 84295 ASSAY OF SERUM SODIUM: CPT

## 2022-12-02 PROCEDURE — 96374 THER/PROPH/DIAG INJ IV PUSH: CPT

## 2022-12-02 PROCEDURE — 82962 GLUCOSE BLOOD TEST: CPT

## 2022-12-02 PROCEDURE — 85014 HEMATOCRIT: CPT

## 2022-12-02 PROCEDURE — 83605 ASSAY OF LACTIC ACID: CPT

## 2022-12-02 PROCEDURE — 82803 BLOOD GASES ANY COMBINATION: CPT

## 2022-12-02 PROCEDURE — 87040 BLOOD CULTURE FOR BACTERIA: CPT

## 2022-12-02 PROCEDURE — 83735 ASSAY OF MAGNESIUM: CPT

## 2022-12-02 PROCEDURE — 87449 NOS EACH ORGANISM AG IA: CPT

## 2022-12-02 PROCEDURE — 82435 ASSAY OF BLOOD CHLORIDE: CPT

## 2022-12-02 PROCEDURE — 84100 ASSAY OF PHOSPHORUS: CPT

## 2022-12-02 PROCEDURE — 80053 COMPREHEN METABOLIC PANEL: CPT

## 2022-12-02 PROCEDURE — 99285 EMERGENCY DEPT VISIT HI MDM: CPT | Mod: 25

## 2022-12-02 PROCEDURE — 84132 ASSAY OF SERUM POTASSIUM: CPT

## 2022-12-02 PROCEDURE — 85025 COMPLETE CBC W/AUTO DIFF WBC: CPT

## 2022-12-02 PROCEDURE — 94640 AIRWAY INHALATION TREATMENT: CPT

## 2022-12-02 PROCEDURE — 0225U NFCT DS DNA&RNA 21 SARSCOV2: CPT

## 2022-12-02 PROCEDURE — 83036 HEMOGLOBIN GLYCOSYLATED A1C: CPT

## 2022-12-02 PROCEDURE — 82330 ASSAY OF CALCIUM: CPT

## 2022-12-02 PROCEDURE — 96375 TX/PRO/DX INJ NEW DRUG ADDON: CPT

## 2022-12-02 PROCEDURE — 85027 COMPLETE CBC AUTOMATED: CPT

## 2022-12-02 PROCEDURE — 80048 BASIC METABOLIC PNL TOTAL CA: CPT

## 2022-12-02 PROCEDURE — 99222 1ST HOSP IP/OBS MODERATE 55: CPT

## 2022-12-02 PROCEDURE — 85018 HEMOGLOBIN: CPT

## 2022-12-02 PROCEDURE — 87086 URINE CULTURE/COLONY COUNT: CPT

## 2022-12-02 RX ORDER — HYDRALAZINE HCL 50 MG
1 TABLET ORAL
Qty: 90 | Refills: 0
Start: 2022-12-02 | End: 2022-12-31

## 2022-12-02 RX ORDER — PANTOPRAZOLE SODIUM 20 MG/1
1 TABLET, DELAYED RELEASE ORAL
Qty: 0 | Refills: 0 | DISCHARGE
Start: 2022-12-02

## 2022-12-02 RX ORDER — IPRATROPIUM/ALBUTEROL SULFATE 18-103MCG
3 AEROSOL WITH ADAPTER (GRAM) INHALATION EVERY 6 HOURS
Refills: 0 | Status: DISCONTINUED | OUTPATIENT
Start: 2022-12-02 | End: 2022-12-02

## 2022-12-02 RX ORDER — CHOLECALCIFEROL (VITAMIN D3) 125 MCG
2000 CAPSULE ORAL
Qty: 0 | Refills: 0 | DISCHARGE
Start: 2022-12-02

## 2022-12-02 RX ORDER — LOSARTAN/HYDROCHLOROTHIAZIDE 100MG-25MG
1 TABLET ORAL
Qty: 0 | Refills: 0 | DISCHARGE

## 2022-12-02 RX ORDER — LOSARTAN POTASSIUM 100 MG/1
1 TABLET, FILM COATED ORAL
Qty: 30 | Refills: 0
Start: 2022-12-02 | End: 2022-12-31

## 2022-12-02 RX ADMIN — BUDESONIDE AND FORMOTEROL FUMARATE DIHYDRATE 2 PUFF(S): 160; 4.5 AEROSOL RESPIRATORY (INHALATION) at 05:38

## 2022-12-02 RX ADMIN — LOSARTAN POTASSIUM 100 MILLIGRAM(S): 100 TABLET, FILM COATED ORAL at 05:37

## 2022-12-02 RX ADMIN — PANTOPRAZOLE SODIUM 40 MILLIGRAM(S): 20 TABLET, DELAYED RELEASE ORAL at 07:33

## 2022-12-02 RX ADMIN — TIOTROPIUM BROMIDE 2 PUFF(S): 18 CAPSULE ORAL; RESPIRATORY (INHALATION) at 13:03

## 2022-12-02 RX ADMIN — ANASTROZOLE 1 MILLIGRAM(S): 1 TABLET ORAL at 13:02

## 2022-12-02 RX ADMIN — Medication 10 MILLIGRAM(S): at 13:23

## 2022-12-02 RX ADMIN — Medication 1200 MILLIGRAM(S): at 05:37

## 2022-12-02 RX ADMIN — Medication 0.3 MILLIGRAM(S): at 13:23

## 2022-12-02 RX ADMIN — Medication 75 MILLIGRAM(S): at 05:38

## 2022-12-02 RX ADMIN — ALBUTEROL 2 PUFF(S): 90 AEROSOL, METERED ORAL at 05:45

## 2022-12-02 RX ADMIN — Medication 3: at 14:25

## 2022-12-02 RX ADMIN — Medication 0.3 MILLIGRAM(S): at 05:36

## 2022-12-02 RX ADMIN — Medication 81 MILLIGRAM(S): at 13:01

## 2022-12-02 RX ADMIN — Medication 2000 UNIT(S): at 13:02

## 2022-12-02 RX ADMIN — Medication 10 MILLIGRAM(S): at 05:38

## 2022-12-02 RX ADMIN — Medication 5 MILLIGRAM(S): at 08:34

## 2022-12-02 RX ADMIN — CARVEDILOL PHOSPHATE 6.25 MILLIGRAM(S): 80 CAPSULE, EXTENDED RELEASE ORAL at 05:37

## 2022-12-02 RX ADMIN — Medication 3 MILLILITER(S): at 13:02

## 2022-12-02 NOTE — PROGRESS NOTE ADULT - PROBLEM SELECTOR PLAN 6
Jacques- [phone] Covid positive followup , letter to return to work [email or web address]  Take vitamin d and c and zinc in the next week  Stay well hydrated  Call if any fever or sob/resp distress 
Per hx  -s/p R [place] in [place] [facility] [place].
Patient's clonidine and Coreg, ARB resumed.
Per hx  -s/p R lobectomy in 2015 @ [facility] [place]
Patient's clonidine and Coreg, ARB resumed.

## 2022-12-02 NOTE — PROGRESS NOTE ADULT - REASON FOR ADMISSION
Cough, fever, dyspnoea past 2 days
Cough, fever, dyspnoea past 2 days
Cough, fever, dyspnea past 2 days
Cough, fever, dyspnoea past 2 days

## 2022-12-02 NOTE — DISCHARGE NOTE PROVIDER - NSDCMRMEDTOKEN_GEN_ALL_CORE_FT
anastrozole 1 mg oral tablet: 1 tab(s) orally once a day    NOTE: pharmacy last dispensed July 2022 for 90 days supply  Aspirin Enteric Coated 81 mg oral delayed release tablet: 1 tab(s) orally once a day  Breztri Aerosphere inhalation aerosol: 2 puff(s) inhaled 2 times a day  carvedilol 6.25 mg oral tablet: 1 tab(s) orally 2 times a day  cloNIDine 0.3 mg oral tablet: 1 tab(s) orally 2 times a day  famotidine 20 mg oral tablet: 1 tab(s) orally once a day (at bedtime)  losartan-hydroCHLOROthiazide 100 mg-25 mg oral tablet: 1 tab(s) orally once a day  pantoprazole 40 mg oral delayed release tablet: 1 tab(s) orally once a day (before a meal)  PARoxetine 10 mg oral tablet: 1 tab(s) orally once a day  rosuvastatin 20 mg oral tablet: 1 tab(s) orally once a day  traZODone 50 mg oral tablet: 0.5 tab(s) orally once a day (at bedtime)  Tresiba FlexTouch 200 units/mL subcutaneous solution: 50 unit(s) subcutaneous once a day  Vitamin D3 50 mcg (2000 intl units) oral tablet: 2 tab(s) orally once a day   anastrozole 1 mg oral tablet: 1 tab(s) orally once a day    NOTE: pharmacy last dispensed July 2022 for 90 days supply  Aspirin Enteric Coated 81 mg oral delayed release tablet: 1 tab(s) orally once a day  Breztri Aerosphere inhalation aerosol: 2 puff(s) inhaled 2 times a day  carvedilol 6.25 mg oral tablet: 1 tab(s) orally 2 times a day  cholecalciferol oral tablet: 2000 unit(s) orally once a day  cloNIDine 0.3 mg oral tablet: 1 tab(s) orally 3 times a day  famotidine 20 mg oral tablet: 1 tab(s) orally once a day (at bedtime)  guaiFENesin 1200 mg oral tablet, extended release: 1 tab(s) orally every 12 hours  hydrALAZINE 25 mg oral tablet: 1 tab(s) orally 3 times a day   losartan 100 mg oral tablet: 1 tab(s) orally once a day   oseltamivir 75 mg oral capsule: 1 cap(s) orally 2 times a day  pantoprazole 40 mg oral delayed release tablet: 1 tab(s) orally once a day (before a meal)  PARoxetine 10 mg oral tablet: 1 tab(s) orally once a day  rosuvastatin 20 mg oral tablet: 1 tab(s) orally once a day  traZODone 50 mg oral tablet: 0.5 tab(s) orally once a day (at bedtime)  Tresiba FlexTouch 200 units/mL subcutaneous solution: 50 unit(s) subcutaneous once a day  Vitamin D3 50 mcg (2000 intl units) oral tablet: 2 tab(s) orally once a day   anastrozole 1 mg oral tablet: 1 tab(s) orally once a day    NOTE: pharmacy last dispensed July 2022 for 90 days supply  Aspirin Enteric Coated 81 mg oral delayed release tablet: 1 tab(s) orally once a day  Breztri Aerosphere inhalation aerosol: 2 puff(s) inhaled 2 times a day  carvedilol 6.25 mg oral tablet: 1 tab(s) orally 2 times a day  cloNIDine 0.3 mg oral tablet: 1 tab(s) orally 3 times a day  famotidine 20 mg oral tablet: 1 tab(s) orally once a day (at bedtime)  guaiFENesin 1200 mg oral tablet, extended release: 1 tab(s) orally every 12 hours  hydrALAZINE 25 mg oral tablet: 1 tab(s) orally 3 times a day   losartan 100 mg oral tablet: 1 tab(s) orally once a day   oseltamivir 75 mg oral capsule: 1 cap(s) orally 2 times a day  pantoprazole 40 mg oral delayed release tablet: 1 tab(s) orally once a day (before a meal)  PARoxetine 10 mg oral tablet: 1 tab(s) orally once a day  rosuvastatin 20 mg oral tablet: 1 tab(s) orally once a day  traZODone 50 mg oral tablet: 0.5 tab(s) orally once a day (at bedtime)  Tresiba FlexTouch 200 units/mL subcutaneous solution: 50 unit(s) subcutaneous once a day  Vitamin D3 50 mcg (2000 intl units) oral tablet: 2 tab(s) orally once a day

## 2022-12-02 NOTE — PROGRESS NOTE ADULT - PROBLEM SELECTOR PLAN 1
RVP + Flu A  -CXR grossly clear, no clear PNA. Procal 0.11  -Suggest continue to monitor off ABX  -C/w Tamiflu  -Supportive care.
abx
RVP + Flu A  -CXR grossly clear, no clear PNA. Suggest monitor off ABX  -F/u procalcitonin  -C/w Tamiflu  -Supportive care.
abx

## 2022-12-02 NOTE — DISCHARGE NOTE PROVIDER - CARE PROVIDERS DIRECT ADDRESSES
Right  implanted port accessed using a 20 3/4gauge non-coring needle primed with 0.9% sodium chloride.  Good blood return obtained.  Blood obtained and sent to lab. Flushed with 30ml 0.9% sodium chloride followed by tape and gauze.  Implanted port site is not sensitive to touch, not swollen, not warm and not reddened.  Patient tolerated procedure well.     ,DirectAddress_Unknown

## 2022-12-02 NOTE — DISCHARGE NOTE NURSING/CASE MANAGEMENT/SOCIAL WORK - PATIENT PORTAL LINK FT
You can access the FollowMyHealth Patient Portal offered by [facility] by registering at the following website: [email or web address]. By joining [email or web address]’s FollowMyHealth portal, you will also be able to view your health information using other applications (apps) compatible with our system.

## 2022-12-02 NOTE — CONSULT NOTE ADULT - SUBJECTIVE AND OBJECTIVE BOX
[facility] - Romulo Lopez, Arturo Salcido Savella  Office Number: [phone]    Initial Consult Note    CHIEF COMPLAINT: Patient is a 77y old  Female who presents with a chief complaint of Cough, fever, dyspnoea past 2 days (02 Dec 2022 07:47)      HPI:  NIGHT HOSPITALIST:   Patient UNKNOWN to me previously, assigned to me at this point via the ER and by Dr. Rivers to admit this 76 y/o independent F--followed by her office physicians above--patient with a history of essential HTN maintained on Coreg, clonidine, Hyzaar 100-25, hyperlipidemia maintained on Lipitor, type 2 DM maintained on Tresiba (patient has not taken Rx due to decreased PO intake), history of RIGHT breast CA diagnosed with screening mammo in 2018, S/P RIGHT breast bx in July 2018 with ER+, [place]+ and HER2 negative, S/P tylectomy in July 2018 with LN dissection with anastrazole started in 2/2019 presumed to be in remission and since, S/P lung nodule resection for lung CA in 11/2015 by Dr. Lawrence at [facility] [place] presumed to be in remission (had CTT chest at [facility] [place] 3 weeks ago), B/L knee replacement by Dr. PETTY Hidalgo in May 2019 at [place], undifferentiated multiple thyroid nodules followed by Dr. REAGAN Simpson above by endocrinology, UNVACCINATED against seasonal influenza and COVID-19 due to patient's concerns about past skin allergy (?), with  the patient self referring following 2 days of progressive cough with white sputum, fever, and dyspnoea following caring for a grandchild reportedly with the flu.  Patient with poor PO with myalgias and fever, chills,   NO diaphoresis.  No hemoptysis.  NO chest pain/pressure.  NO palpitations.    (29 Nov 2022 17:04)      PAST MEDICAL & SURGICAL HISTORY:  Hypertension      Atrial Arrhythmia      Urinary Urgency      Obesity (BMI 30-39.9)      Diabetes mellitus, type 2      Dyslipidemia      History of breast cancer  right, 2018 treated with radiation      Lung cancer  2015, right lobe. treated with lobectomy      Osteoarthritis of both knees      Obesity (BMI 30.0-34.9)      Onychomycosis of toenail  1st right toe      Pedal edema      COVID-19 vaccination refused      Status Post Breast Lumpectomy  right 2018      History of D&amp;C  2007      History of lobectomy of lung  2015, right upper lobe      H/O total knee replacement, bilateral          SOCIAL HISTORY:  No tobacco, ethanol, or drug abuse.    FAMILY HISTORY:  Family history of breast cancer (Mother, Sibling)    Family history of heart attack (Father)      No family history of acute MI or sudden cardiac death.    MEDICATIONS  (STANDING):  albuterol/ipratropium for Nebulization 3 milliLiter(s) Nebulizer every 6 hours  anastrozole 1 milliGRAM(s) Oral daily  aspirin enteric coated 81 milliGRAM(s) Oral daily  atorvastatin 20 milliGRAM(s) Oral at bedtime  budesonide 160 MICROgram(s)/formoterol 4.5 MICROgram(s) Inhaler 2 Puff(s) Inhalation two times a day  carvedilol 6.25 milliGRAM(s) Oral <User Schedule>  cholecalciferol 2000 Unit(s) Oral daily  cloNIDine 0.3 milliGRAM(s) Oral three times a day  dextrose 5%. 1000 milliLiter(s) (50 mL/Hr) IV Continuous <Continuous>  dextrose 5%. 1000 milliLiter(s) (100 mL/Hr) IV Continuous <Continuous>  dextrose 50% Injectable 25 Gram(s) IV Push once  dextrose 50% Injectable 12.5 Gram(s) IV Push once  dextrose 50% Injectable 25 Gram(s) IV Push once  enoxaparin Injectable 40 milliGRAM(s) SubCutaneous every 24 hours  glucagon  Injectable 1 milliGRAM(s) IntraMuscular once  guaiFENesin ER 1200 milliGRAM(s) Oral every 12 hours  hydrALAZINE 10 milliGRAM(s) Oral three times a day  insulin lispro (ADMELOG) corrective regimen sliding scale   SubCutaneous three times a day before meals  insulin lispro (ADMELOG) corrective regimen sliding scale   SubCutaneous at bedtime  losartan 100 milliGRAM(s) Oral daily  oseltamivir 75 milliGRAM(s) Oral two times a day  pantoprazole    Tablet 40 milliGRAM(s) Oral before breakfast  polyethylene glycol 3350 17 Gram(s) Oral daily  senna 2 Tablet(s) Oral at bedtime  tiotropium 2.5 MICROgram(s) Inhaler 2 Puff(s) Inhalation daily    MEDICATIONS  (PRN):  acetaminophen     Tablet .. 650 milliGRAM(s) Oral every 6 hours PRN Temp greater or equal to 38C (100.4F), Mild Pain (1 - 3)  bisacodyl 5 milliGRAM(s) Oral every 12 hours PRN Constipation  calamine/zinc oxide Lotion 1 Application(s) Topical daily PRN Rash and/or Itching  dextrose Oral Gel 15 Gram(s) Oral once PRN Blood Glucose LESS THAN 70 milliGRAM(s)/deciliter      Allergies    No Known Drug Allergies  shellfish (Vomiting)    Intolerances        REVIEW OF SYSTEMS:    CONSTITUTIONAL: No weakness, fevers or chills  EYES/ENT: No visual changes;  No vertigo or throat pain   NECK: No pain or stiffness  RESPIRATORY: reports cough/wheezing, no hemoptysis; reports shortness of breath  CARDIOVASCULAR: No chest pain or palpitations  GASTROINTESTINAL: No abdominal pain. No nausea, vomiting, or hematemesis; No diarrhea or constipation. No melena or hematochezia.  GENITOURINARY: No dysuria, frequency or hematuria  NEUROLOGICAL: No numbness or weakness  SKIN: No itching or rash  All other review of systems is negative unless indicated above    VITAL SIGNS:   Vital Signs Last 24 Hrs  T(C): 36.5 (02 Dec 2022 08:05), Max: 36.8 (01 Dec 2022 14:00)  T(F): 97.7 (02 Dec 2022 08:05), Max: 98.3 (01 Dec 2022 14:00)  HR: 56 (02 Dec 2022 08:05) (56 - 79)  BP: 116/72 (02 Dec 2022 08:05) (116/72 - 167/81)  BP(mean): --  RR: 18 (02 Dec 2022 08:05) (18 - 19)  SpO2: 91% (02 Dec 2022 08:05) (91% - 100%)    Parameters below as of 02 Dec 2022 08:05  Patient On (Oxygen Delivery Method): room air        I&O's Summary    01 Dec 2022 07:01  -  02 Dec 2022 07:00  --------------------------------------------------------  IN: 1200 mL / OUT: 1600 mL / NET: -400 mL        On Exam:    Constitutional: NAD, alert and oriented x 3  Lungs:  Non-labored, breath sounds are coarse bilaterally, No wheezing, rales or rhonchi  Cardiovascular: RRR.  S1 and S2 positive.  No murmurs, rubs, gallops or clicks  Gastrointestinal: Bowel Sounds present, soft, nontender.   Lymph: No peripheral edema. No cervical lymphadenopathy.  Neurological: Alert, no focal deficits  Skin: No rashes or ulcers   Psych:  Mood & affect appropriate.    LABS: All Labs Reviewed:                        13.8   6.52  )-----------( 114      ( 30 Nov 2022 07:42 )             42.4                         14.8   10.01 )-----------( 144      ( 29 Nov 2022 13:14 )             42.9     02 Dec 2022 07:58    135    |  96     |  16     ----------------------------<  142    4.5     |  33     |  0.87   01 Dec 2022 08:55    138    |  94     |  14     ----------------------------<  119    3.2     |  30     |  0.76   30 Nov 2022 07:42    136    |  95     |  16     ----------------------------<  155    3.8     |  33     |  0.89     Ca    8.6        02 Dec 2022 07:58  Ca    8.4        01 Dec 2022 08:55  Ca    8.5        30 Nov 2022 07:42  Phos  2.0       01 Dec 2022 08:55  Phos  3.3       30 Nov 2022 07:42  Mg     2.1       01 Dec 2022 08:55  Mg     2.3       30 Nov 2022 07:42  Mg     2.1       29 Nov 2022 21:21    TPro  6.5    /  Alb  3.9    /  TBili  1.4    /  DBili  x      /  AST  38     /  ALT  16     /  AlkPhos  71     30 Nov 2022 07:42  TPro  7.6    /  Alb  4.6    /  TBili  2.4    /  DBili  x      /  AST  28     /  ALT  14     /  AlkPhos  92     29 Nov 2022 13:14          Blood Culture: Organism --  Gram Stain Blood -- Gram Stain --  Specimen Source Clean Catch Clean Catch (Midstream)  Culture-Blood --    Organism --  Gram Stain Blood -- Gram Stain --  Specimen Source .Blood Blood-Peripheral  Culture-Blood --    Organism --  Gram Stain Blood -- Gram Stain --  Specimen Source .Blood Blood-Peripheral  Culture-Blood --            RADIOLOGY:    tele: sb

## 2022-12-02 NOTE — CONSULT NOTE ADULT - ASSESSMENT
Michelle is a 78 y/o F with a history of essential HTN, hyperlipidemia, type 2 DM, history of RIGHT breast CA diagnosed with screening mammo in 2018, S/P RIGHT breast bx in July 2018 with ER+, [place]+ and HER2 negative, S/P tylectomy in July 2018 with LN dissection with anastrazole started in 2/2019 presumed to be in remission and since, S/P lung nodule resection for lung CA in 11/2015, who presents with 2 days of progressive cough with white sputum, fever, and dyspnea, and found to be influenza positive.     - breathing has significantly improved since admission  - off 02 this am, and seems to be tolerating it  - no sign of acute ischemia or volume overload  - cont asa and statin  - cont rx for reactive airway disease and tamiflu    - significantly hypertensive to 200 range on admission, now notably improved  - cont coreg, losartan, hydralazine and clonidine  - maintaining sr/sb on telemetry    - trend creatinine and electrolytes. Keep K>4, mg>2  - if able to ambulate and maintain sats off 02, dc planning  - will follow with you
78 y/o F with PMH of HTN, HLD, DM, R breast CA (s/p lumpectomy with LN dissection in 2019), lung CA (s/p R lobectomy 2015 @ [facility] [place]), thyroid nodules. Presents with cough, subjective fevers, and SOB x2 days, also noted to be caring for grandchild with the flu. RVP + for Flu A on admission. Low grade temp of 99.8 F on admission, sat 90% on RA. Also hypertensive, with SBP >200. CXR grossly clear. Pulmonary called to consult for SOB 2nd to Flu A.

## 2022-12-02 NOTE — DISCHARGE NOTE NURSING/CASE MANAGEMENT/SOCIAL WORK - NSDCPEFALRISK_GEN_ALL_CORE
For information on Fall & Injury Prevention, visit: [email or web address] OR  [email or web address] OR  https://www.cdc.gov/steadi/patient.html

## 2022-12-02 NOTE — PROGRESS NOTE ADULT - ASSESSMENT
78 y/o F with PMH of HTN, HLD, DM, R breast CA (s/p lumpectomy with LN dissection in 2019), lung CA (s/p R lobectomy 2015 @ [facility] [place] thyroid nodules. Presents with cough, subjective fevers, and SOB x2 days, also noted to be caring for grandchild with the flu. RVP + for Flu A on admission. Low grade temp of 99.8 F on admission, sat 90% on RA. Also hypertensive, with SBP >200. CXR grossly clear. Pulmonary called to consult for SOB 2nd to Flu A.   
76 y/o F with PMH of HTN, HLD, DM, R breast CA (s/p lumpectomy with LN dissection in 2019), lung CA (s/p R lobectomy 2015 @ [facility] [place] thyroid nodules. Presents with cough, subjective fevers, and SOB x2 days, also noted to be caring for grandchild with the flu. RVP + for Flu A on admission. Low grade temp of 99.8 F on admission, sat 90% on RA. Also hypertensive, with SBP >200. CXR grossly clear. Pulmonary called to consult for SOB 2nd to Flu A.

## 2022-12-02 NOTE — PHYSICAL THERAPY INITIAL EVALUATION ADULT - PERTINENT HX OF CURRENT PROBLEM, REHAB EVAL
76 y/o F with PMH of HTN, HLD, DM, R breast CA (s/p lumpectomy with LN dissection in 2019), lung CA (s/p R lobectomy 2015 @ [facility] [place] thyroid nodules. Presents with cough, subjective fevers, and SOB x2 days, also noted to be caring for grandchild with the flu. RVP + for Flu A on admission. Low grade temp of 99.8 F on admission, sat 90% on RA. Also hypertensive, with SBP >200. CXR grossly clear. Pulmonary called to consult for SOB 2nd to Flu A. Patient reports being a former smoker, hx of COPD not on home O2, sees Dr. Ignacio as an outpatient. +Congested cough, mild dyspnea primarily with exertion. Denies CP, fever, pleuritic chest pain. Hosp Cx: 11/29 CXR:Clear lungs. 11/30: 2LNC stating at 94%. 12/2: 2LNC 99%, now on RA.

## 2022-12-02 NOTE — DISCHARGE NOTE PROVIDER - HOSPITAL COURSE
NIGHT HOSPITALIST:   Patient UNKNOWN to me previously, assigned to me at this point via the ER and by Dr. Rivers to admit this 78 y/o independent F--followed by her office physicians above--patient with a history of essential HTN maintained on Coreg, clonidine, Hyzaar 100-25, hyperlipidemia maintained on Lipitor, type 2 DM maintained on Tresiba (patient has not taken Rx due to decreased PO intake), history of RIGHT breast CA diagnosed with screening mammo in 2018, S/P RIGHT breast bx in July 2018 with ER+, [place]+ and HER2 negative, S/P tylectomy in July 2018 with LN dissection with anastrazole started in 2/2019 presumed to be in remission and since, S/P lung nodule [place] for lung CA [place] 11[place]2015 [place] [facility] [place] to be in remission ([place] [facility] [place]), B/L knee replacement by Dr. PETTY Hidalgo in May 2019 at [place], undifferentiated multiple thyroid nodules followed by Dr. REAGAN Simpson above by endocrinology, UNVACCINATED against seasonal influenza and COVID-19 due to patient's concerns about past skin allergy (?), with  the patient self referring following 2 days of progressive cough with white sputum, fever, and dyspnoea following caring for a grandchild reportedly with the flu.  Patient with poor PO with myalgias and fever, chills,   NO diaphoresis.  No hemoptysis.  NO chest pain/pressure.  NO palpitations.     Patient admitted for influenza A , treated with tamiflu and supportive care, during hospitalization the blood pressure was elevated and she developed some ectopy on telemetry. She was seen by cardiology and now medically cleared for discharge. patient must follow up outpatient with your medical doctor and cardiology .

## 2022-12-02 NOTE — PROGRESS NOTE ADULT - PROBLEM SELECTOR PLAN 3
Likely 2nd to exacerbation of COPD, Flu A as above  -CXR grossly clear  -Keep sats >90% with O2 (currently RA)  -Bronchodilators as above.
Likely 2nd to exacerbation of COPD, Flu A as above  -CXR grossly clear  -Keep sats >90% with O2 PRN (currently 2LNC). Wean O2 as tolerated  -Bronchodilators as above.
S/P CTT chest per patient by Dr. Lawrence 3 weeks ago (patient refuses repeat CTT chest)>>
S/P CTT chest per patient by Dr. Lawrence 3 weeks ago (patient refuses repeat CTT chest)>>

## 2022-12-02 NOTE — PHYSICAL THERAPY INITIAL EVALUATION ADULT - ADDITIONAL COMMENTS
Pt lives in a private home with 2 steps to enter, bedroom and bathroom on first floot. Pt owns a rolling walker. Pt lives in a private home with 2 steps to enter, bedroom and bathroom on first floor. Pt owns a rolling walker.

## 2022-12-02 NOTE — PHYSICAL THERAPY INITIAL EVALUATION ADULT - PLANNED THERAPY INTERVENTIONS, PT EVAL
GOAL: Pt will negotiate up/down  2 steps with R handrail ascending independnently in 2 weeks/balance training/gait training/strengthening/transfer training

## 2022-12-02 NOTE — PROGRESS NOTE ADULT - PROBLEM SELECTOR PLAN 5
Per hx  -S/p radiation, lumpectomy with LN dissection in 2019.
--likely from hypokalemia and hypomagnesemia.   Will temporarily HOLD the HCTZ component of the Hyzaar.
--likely from hypokalemia and hypomagnesemia.   Will temporarily HOLD the HCTZ component of the Hyzaar.
Per hx  -S/p radiation, lumpectomy with LN dissection in 2019.

## 2022-12-02 NOTE — DISCHARGE NOTE PROVIDER - DISCHARGE DIET
Regular Diet - No restrictions/DASH Diet/Low Sodium Diet Tazorac Pregnancy And Lactation Text: This medication is not safe during pregnancy. It is unknown if this medication is excreted in breast milk.

## 2022-12-02 NOTE — PROGRESS NOTE ADULT - PROBLEM SELECTOR PLAN 4
Elevated BP on admission  -Per primary team, c/w home meds.
Undifferentiated nodules, endo eval
Elevated BP on admission  -Per primary team, c/w home meds.
Undifferentiated nodules, endo eval

## 2022-12-02 NOTE — DISCHARGE NOTE PROVIDER - NSDCCPCAREPLAN_GEN_ALL_CORE_FT
PRINCIPAL DISCHARGE DIAGNOSIS  Diagnosis: Acute respiratory failure with hypoxia  Assessment and Plan of Treatment: you had influenza A , continue with the tamiflu as prescribed and follow up with your PMD      SECONDARY DISCHARGE DIAGNOSES  Diagnosis: Multiple thyroid nodules  Assessment and Plan of Treatment: follow up with PCP    Diagnosis: COPD exacerbation  Assessment and Plan of Treatment: Call your Health Care provider upon arrival home to make a follow up appointment within one week.  Take all inhalers as prescribed by your Health Care Provider.  Take steroids as prescribed by your Health Care Provider.  If your cough increases infrequency and severity and/or you have shortness of breath or increased shortness of breath call your Health Care Provider.  If you develop fever, chills, night sweats, malaise, and/or change in mental status call your Health care Provider.  Nutrition is very important.  Eat small frequent meals.  Increase your activity as tolerated.  Do not stay in bed all day      Diagnosis: Essential hypertension  Assessment and Plan of Treatment: you blood pressure was high in the hospital , please follow up with your doctor for stabilization . You were started on a new medication called hydralazine, please stop the HCTZ and follow up with cardiology and PCP    Diagnosis: Diabetes mellitus  Assessment and Plan of Treatment:   Make sure you get your HgA1c checked every three months.  If you take oral diabetes medications, check your blood glucose two times a day.  If you take insulin, check your blood glucose before meals and at bedtime.  It's important not to skip any meals.  Keep a log of your blood glucose results and always take it with you to your doctor appointments.  Keep a list of your current medications including injectables and over the counter medications and bring this medication list with you to all your doctor appointments.  If you have not seen your ophthalmologist this year call for appointment.  Check your feet daily for redness, sores, or openings. Do not self treat. If no improvement in two days call your primary care physician for an appointment.  Low blood sugar (hypoglycemia) is a blood sugar below 70mg/dl. Check your blood sugar if you feel signs/symptoms of hypoglycemia. If your blood sugar is below 70 take 15 grams of carbohydrates (ex 4 oz of apple juice, 3-4 glucose tablets, or 4-6 oz of regular soda) wait 15 minutes and repeat blood sugar to make sure it comes up above 70.  If your blood sugar is above 70 and you are due for a meal, have a meal.  If you are not due for a meal have a snack.  This snack helps keeps your blood sugar at a safe range.      Diagnosis: History of breast cancer  Assessment and Plan of Treatment: please follow up with oncology /primary care doctor for ongoing care

## 2022-12-02 NOTE — PROGRESS NOTE ADULT - PROBLEM SELECTOR PLAN 2
2nd to Flu A as above  -Not on home O2  -Symbicort/Spiriva while inpatient. Home med Breztri, can resume on discharge   -Duoneb q6h  -Mucinex 1200 mg PO BID x5 days  -No wheezing on exam, can monitor off steroids  -F/u with Dr. Ignacio on discharge.
2nd to Flu A as above  -Not on home O2  -Symbicort/Spiriva while inpatient. Home med Breztri, can resume on discharge   -Duoneb q6h (please send patient with script for nebulizer machine, can make nebs PRN on discharge)  -Mucinex 1200 mg PO BID x5 days  -No wheezing on exam, can monitor off steroids  -F/u with Dr. Ignacio on discharge.
heme onc f/u
heme onc f/u

## 2022-12-02 NOTE — PHYSICAL THERAPY INITIAL EVALUATION ADULT - PHYSICAL ASSIST/NONPHYSICAL ASSIST: SIT/STAND, REHAB EVAL
verbal cues/nonverbal cues (demo/gestures)/1 person assist VC's for proper technique/supervision/verbal cues/nonverbal cues (demo/gestures) VC's for proper technique/verbal cues/nonverbal cues (demo/gestures)/1 person assist

## 2022-12-02 NOTE — PROGRESS NOTE ADULT - SUBJECTIVE AND OBJECTIVE BOX
Follow-up Pulm Progress Note    Dyspnea improving  Still with congested cough  O2 sats 99% on 2LNC, placed on RA now    Medications:  MEDICATIONS  (STANDING):  albuterol/ipratropium for Nebulization 3 milliLiter(s) Nebulizer every 6 hours  anastrozole 1 milliGRAM(s) Oral daily  aspirin enteric coated 81 milliGRAM(s) Oral daily  atorvastatin 20 milliGRAM(s) Oral at bedtime  budesonide 160 MICROgram(s)/formoterol 4.5 MICROgram(s) Inhaler 2 Puff(s) Inhalation two times a day  carvedilol 6.25 milliGRAM(s) Oral <User Schedule>  cholecalciferol 2000 Unit(s) Oral daily  cloNIDine 0.3 milliGRAM(s) Oral three times a day  dextrose 5%. 1000 milliLiter(s) (50 mL/Hr) IV Continuous <Continuous>  dextrose 5%. 1000 milliLiter(s) (100 mL/Hr) IV Continuous <Continuous>  dextrose 50% Injectable 25 Gram(s) IV Push once  dextrose 50% Injectable 12.5 Gram(s) IV Push once  dextrose 50% Injectable 25 Gram(s) IV Push once  enoxaparin Injectable 40 milliGRAM(s) SubCutaneous every 24 hours  glucagon  Injectable 1 milliGRAM(s) IntraMuscular once  guaiFENesin ER 1200 milliGRAM(s) Oral every 12 hours  hydrALAZINE 10 milliGRAM(s) Oral three times a day  insulin lispro (ADMELOG) corrective regimen sliding scale   SubCutaneous three times a day before meals  insulin lispro (ADMELOG) corrective regimen sliding scale   SubCutaneous at bedtime  losartan 100 milliGRAM(s) Oral daily  oseltamivir 75 milliGRAM(s) Oral two times a day  pantoprazole    Tablet 40 milliGRAM(s) Oral before breakfast  polyethylene glycol 3350 17 Gram(s) Oral daily  senna 2 Tablet(s) Oral at bedtime  tiotropium 2.5 MICROgram(s) Inhaler 2 Puff(s) Inhalation daily    MEDICATIONS  (PRN):  acetaminophen     Tablet .. 650 milliGRAM(s) Oral every 6 hours PRN Temp greater or equal to 38C (100.4F), Mild Pain (1 - 3)  calamine/zinc oxide Lotion 1 Application(s) Topical daily PRN Rash and/or Itching  dextrose Oral Gel 15 Gram(s) Oral once PRN Blood Glucose LESS THAN 70 milliGRAM(s)/deciliter    Vital Signs Last 24 Hrs  T(C): 36.4 (02 Dec 2022 05:19), Max: 37.1 (01 Dec 2022 09:14)  T(F): 97.6 (02 Dec 2022 05:19), Max: 98.7 (01 Dec 2022 09:14)  HR: 61 (02 Dec 2022 05:19) (60 - 79)  BP: 162/75 (02 Dec 2022 05:19) (123/71 - 167/81)  BP(mean): --  RR: 18 (02 Dec 2022 05:19) (18 - 19)  SpO2: 100% (02 Dec 2022 05:19) (94% - 100%)    Parameters below as of 02 Dec 2022 05:19  Patient On (Oxygen Delivery Method): room air  O2 Flow (L/min): 2    12-01 @ 07:01  -  12-02 @ 07:00  --------------------------------------------------------  IN: 1200 mL / OUT: 1600 mL / NET: -400 mL    LABS:    12-01    138  |  94<L>  |  14  ----------------------------<  119<H>  3.2<L>   |  30  |  0.76    Ca    8.4      01 Dec 2022 08:55  Phos  2.0     12-01  Mg     2.1     12-01    CAPILLARY BLOOD GLUCOSE  POCT Blood Glucose.: 223 mg/dL (01 Dec 2022 21:24)    Procalcitonin, Serum: 0.11 ng/mL (12-01-22 @ 08:55)    CULTURES:     Culture - Blood (collected 11-29-22 @ 12:45)  Source: .Blood Blood-Peripheral  Preliminary Report (11-30-22 @ 20:02):    No growth to date.    Culture - Blood (collected 11-29-22 @ 12:30)  Source: .Blood Blood-Peripheral  Preliminary Report (11-30-22 @ 20:02):    No growth to date.    Culture - Urine (collected 11-30-22 @ 18:13)  Source: Clean Catch Clean Catch (Midstream)  Final Report (12-02-22 @ 06:40):    No growth    Physical Examination:  PULM: b/l rhonchi   CVS: RRR    RADIOLOGY REVIEWED  CXR: grossly clear      
    SUBJECTIVE / OVERNIGHT EVENTS:pt seen and examined    MEDICATIONS  (STANDING):  albuterol    90 MICROgram(s) HFA Inhaler 2 Puff(s) Inhalation every 6 hours  anastrozole 1 milliGRAM(s) Oral daily  aspirin enteric coated 81 milliGRAM(s) Oral daily  atorvastatin 20 milliGRAM(s) Oral at bedtime  budesonide 160 MICROgram(s)/formoterol 4.5 MICROgram(s) Inhaler 2 Puff(s) Inhalation two times a day  carvedilol 6.25 milliGRAM(s) Oral <User Schedule>  cholecalciferol 2000 Unit(s) Oral daily  cloNIDine 0.3 milliGRAM(s) Oral three times a day  dextrose 5%. 1000 milliLiter(s) (50 mL/Hr) IV Continuous <Continuous>  dextrose 5%. 1000 milliLiter(s) (100 mL/Hr) IV Continuous <Continuous>  dextrose 50% Injectable 25 Gram(s) IV Push once  dextrose 50% Injectable 12.5 Gram(s) IV Push once  dextrose 50% Injectable 25 Gram(s) IV Push once  enoxaparin Injectable 40 milliGRAM(s) SubCutaneous every 24 hours  glucagon  Injectable 1 milliGRAM(s) IntraMuscular once  guaiFENesin ER 1200 milliGRAM(s) Oral every 12 hours  hydrALAZINE 10 milliGRAM(s) Oral three times a day  insulin lispro (ADMELOG) corrective regimen sliding scale   SubCutaneous three times a day before meals  insulin lispro (ADMELOG) corrective regimen sliding scale   SubCutaneous at bedtime  losartan 100 milliGRAM(s) Oral daily  oseltamivir 75 milliGRAM(s) Oral two times a day  pantoprazole    Tablet 40 milliGRAM(s) Oral before breakfast  polyethylene glycol 3350 17 Gram(s) Oral daily  senna 2 Tablet(s) Oral at bedtime  tiotropium 2.5 MICROgram(s) Inhaler 2 Puff(s) Inhalation daily    MEDICATIONS  (PRN):  acetaminophen     Tablet .. 650 milliGRAM(s) Oral every 6 hours PRN Temp greater or equal to 38C (100.4F), Mild Pain (1 - 3)  calamine/zinc oxide Lotion 1 Application(s) Topical daily PRN Rash and/or Itching  dextrose Oral Gel 15 Gram(s) Oral once PRN Blood Glucose LESS THAN 70 milliGRAM(s)/deciliter    Vital Signs Last 24 Hrs  T(C): 36.7 (12-01-22 @ 21:15), Max: 37.7 (12-01-22 @ 05:25)  T(F): 98 (12-01-22 @ 21:15), Max: 99.8 (12-01-22 @ 05:25)  HR: 60 (12-01-22 @ 21:15) (60 - 80)  BP: 123/71 (12-01-22 @ 21:15) (122/72 - 185/84)  BP(mean): --  RR: 18 (12-01-22 @ 21:15) (18 - 20)  SpO2: 99% (12-01-22 @ 21:15) (93% - 99%)        Constitutional: No fever, fatigue  Skin: No rash.  Eyes: No recent vision problems or eye pain.  ENT: No congestion, ear pain, or sore throat.  Cardiovascular: No chest pain or palpation.  Respiratory: No cough, shortness of breath, congestion, or wheezing.  Gastrointestinal: No abdominal pain, nausea, vomiting, or diarrhea.  Genitourinary: No dysuria.  Musculoskeletal: No joint swelling.  Neurologic: No headache.    PHYSICAL EXAM:  GENERAL: NAD  EYES: EOMI, PERRLA  NECK: Supple, No JVD  CHEST/LUNG: dec breath sounds rt base  HEART:  S1 , S2 +  ABDOMEN: soft , bs+  EXTREMITIES:  trace edema  NEUROLOGY:alert awake      LABS:  12-01    138  |  94<L>  |  14  ----------------------------<  119<H>  3.2<L>   |  30  |  0.76    Ca    8.4      01 Dec 2022 08:55  Phos  2.0     12-01  Mg     2.1     12-01    TPro  6.5  /  Alb  3.9  /  TBili  1.4<H>  /  DBili      /  AST  38  /  ALT  16  /  AlkPhos  71  11-30    Creatinine Trend: 0.76 <--, 0.89 <--, 0.82 <--                        13.8   6.52  )-----------( 114      ( 30 Nov 2022 07:42 )             42.4     Urine Studies:            LIVER FUNCTIONS - ( 30 Nov 2022 07:42 )  Alb: 3.9 g/dL / Pro: 6.5 g/dL / ALK PHOS: 71 U/L / ALT: 16 U/L / AST: 38 U/L / GGT: x                     RADIOLOGY & ADDITIONAL TESTS:    Imaging Personally Reviewed:yes    Consultant(s) Notes Reviewed:  yes    Care Discussed with Consultants/Other Providers:yes  
Follow-up Pulm Progress Note    Denies CP, SOB at rest  Mild dyspnea with exertion  O2 sats 94% on 2LNC  +cough    Medications:  MEDICATIONS  (STANDING):  albuterol    90 MICROgram(s) HFA Inhaler 2 Puff(s) Inhalation every 6 hours  anastrozole 1 milliGRAM(s) Oral daily  aspirin enteric coated 81 milliGRAM(s) Oral daily  atorvastatin 20 milliGRAM(s) Oral at bedtime  budesonide 160 MICROgram(s)/formoterol 4.5 MICROgram(s) Inhaler 2 Puff(s) Inhalation two times a day  carvedilol 6.25 milliGRAM(s) Oral <User Schedule>  cholecalciferol 2000 Unit(s) Oral daily  cloNIDine 0.3 milliGRAM(s) Oral three times a day  dextrose 5%. 1000 milliLiter(s) (50 mL/Hr) IV Continuous <Continuous>  dextrose 5%. 1000 milliLiter(s) (100 mL/Hr) IV Continuous <Continuous>  dextrose 50% Injectable 25 Gram(s) IV Push once  dextrose 50% Injectable 12.5 Gram(s) IV Push once  dextrose 50% Injectable 25 Gram(s) IV Push once  enoxaparin Injectable 40 milliGRAM(s) SubCutaneous every 24 hours  glucagon  Injectable 1 milliGRAM(s) IntraMuscular once  guaiFENesin ER 1200 milliGRAM(s) Oral every 12 hours  hydrALAZINE 10 milliGRAM(s) Oral three times a day  insulin lispro (ADMELOG) corrective regimen sliding scale   SubCutaneous three times a day before meals  insulin lispro (ADMELOG) corrective regimen sliding scale   SubCutaneous at bedtime  losartan 100 milliGRAM(s) Oral daily  oseltamivir 75 milliGRAM(s) Oral two times a day  pantoprazole    Tablet 40 milliGRAM(s) Oral before breakfast  polyethylene glycol 3350 17 Gram(s) Oral daily  senna 2 Tablet(s) Oral at bedtime  tiotropium 2.5 MICROgram(s) Inhaler 2 Puff(s) Inhalation daily    MEDICATIONS  (PRN):  acetaminophen     Tablet .. 650 milliGRAM(s) Oral every 6 hours PRN Temp greater or equal to 38C (100.4F), Mild Pain (1 - 3)  calamine/zinc oxide Lotion 1 Application(s) Topical daily PRN Rash and/or Itching  dextrose Oral Gel 15 Gram(s) Oral once PRN Blood Glucose LESS THAN 70 milliGRAM(s)/deciliter    Vital Signs Last 24 Hrs  T(C): 36.8 (01 Dec 2022 06:03), Max: 39.3 (30 Nov 2022 13:40)  T(F): 98.3 (01 Dec 2022 06:03), Max: 102.8 (30 Nov 2022 13:40)  HR: 80 (01 Dec 2022 06:03) (72 - 100)  BP: 122/72 (01 Dec 2022 06:41) (122/72 - 185/84)  BP(mean): --  RR: 20 (01 Dec 2022 06:41) (18 - 20)  SpO2: 94% (01 Dec 2022 06:41) (93% - 98%)    Parameters below as of 01 Dec 2022 06:41  Patient On (Oxygen Delivery Method): nasal cannula  O2 Flow (L/min): 2    VBG pH 7.45 11-29 @ 12:40    VBG pCO2 50 11-29 @ 12:40    VBG O2 sat 80.3 11-29 @ 12:40    VBG lactate 1.6 11-29 @ 12:40      11-30 @ 07:01  -  12-01 @ 07:00  --------------------------------------------------------  IN: 640 mL / OUT: 1600 mL / NET: -960 mL    LABS:                        13.8   6.52  )-----------( 114      ( 30 Nov 2022 07:42 )             42.4     11-30    136  |  95<L>  |  16  ----------------------------<  155<H>  3.8   |  33<H>  |  0.89    Ca    8.5      30 Nov 2022 07:42  Phos  3.3     11-30  Mg     2.3     11-30    TPro  6.5  /  Alb  3.9  /  TBili  1.4<H>  /  DBili  x   /  AST  38  /  ALT  16  /  AlkPhos  71  11-30    CAPILLARY BLOOD GLUCOSE  POCT Blood Glucose.: 151 mg/dL (01 Dec 2022 07:48)    CULTURES:    Culture - Blood (collected 11-29-22 @ 12:45)  Source: .Blood Blood-Peripheral  Preliminary Report (11-30-22 @ 20:02):    No growth to date.    Culture - Blood (collected 11-29-22 @ 12:30)  Source: .Blood Blood-Peripheral  Preliminary Report (11-30-22 @ 20:02):    No growth to date.    Physical Examination:  PULM: b/l rhonchi   CVS: RRR    RADIOLOGY REVIEWED  CXR: grossly clear  
    SUBJECTIVE / OVERNIGHT EVENTS:pt seen and examined    MEDICATIONS  (STANDING):  albuterol    90 MICROgram(s) HFA Inhaler 2 Puff(s) Inhalation every 6 hours  anastrozole 1 milliGRAM(s) Oral daily  aspirin enteric coated 81 milliGRAM(s) Oral daily  atorvastatin 20 milliGRAM(s) Oral at bedtime  budesonide 160 MICROgram(s)/formoterol 4.5 MICROgram(s) Inhaler 2 Puff(s) Inhalation two times a day  carvedilol 6.25 milliGRAM(s) Oral <User Schedule>  cholecalciferol 2000 Unit(s) Oral daily  cloNIDine 0.3 milliGRAM(s) Oral two times a day  dextrose 5%. 1000 milliLiter(s) (50 mL/Hr) IV Continuous <Continuous>  dextrose 5%. 1000 milliLiter(s) (100 mL/Hr) IV Continuous <Continuous>  dextrose 50% Injectable 25 Gram(s) IV Push once  dextrose 50% Injectable 12.5 Gram(s) IV Push once  dextrose 50% Injectable 25 Gram(s) IV Push once  enoxaparin Injectable 40 milliGRAM(s) SubCutaneous every 24 hours  glucagon  Injectable 1 milliGRAM(s) IntraMuscular once  guaiFENesin ER 1200 milliGRAM(s) Oral every 12 hours  insulin lispro (ADMELOG) corrective regimen sliding scale   SubCutaneous three times a day before meals  insulin lispro (ADMELOG) corrective regimen sliding scale   SubCutaneous at bedtime  losartan 100 milliGRAM(s) Oral daily  oseltamivir 75 milliGRAM(s) Oral two times a day  pantoprazole    Tablet 40 milliGRAM(s) Oral before breakfast  polyethylene glycol 3350 17 Gram(s) Oral daily  senna 2 Tablet(s) Oral at bedtime  tiotropium 2.5 MICROgram(s) Inhaler 2 Puff(s) Inhalation daily    MEDICATIONS  (PRN):  acetaminophen     Tablet .. 650 milliGRAM(s) Oral every 6 hours PRN Temp greater or equal to 38C (100.4F), Mild Pain (1 - 3)  calamine/zinc oxide Lotion 1 Application(s) Topical daily PRN Rash and/or Itching  dextrose Oral Gel 15 Gram(s) Oral once PRN Blood Glucose LESS THAN 70 milliGRAM(s)/deciliter    T(C): 37 (11-30-22 @ 21:29), Max: 39.3 (11-30-22 @ 13:40)  HR: 100 (11-30-22 @ 21:29) (83 - 100)  BP: 147/81 (11-30-22 @ 21:29) (147/81 - 184/87)  RR: 18 (11-30-22 @ 21:29) (18 - 20)  SpO2: 93% (11-30-22 @ 21:29) (93% - 98%)    CAPILLARY BLOOD GLUCOSE      POCT Blood Glucose.: 143 mg/dL (30 Nov 2022 21:12)  POCT Blood Glucose.: 152 mg/dL (30 Nov 2022 17:04)  POCT Blood Glucose.: 182 mg/dL (30 Nov 2022 12:00)  POCT Blood Glucose.: 136 mg/dL (30 Nov 2022 07:45)    I&O's Summary    29 Nov 2022 07:01  -  30 Nov 2022 07:00  --------------------------------------------------------  IN: 120 mL / OUT: 0 mL / NET: 120 mL    30 Nov 2022 07:01  -  01 Dec 2022 00:09  --------------------------------------------------------  IN: 520 mL / OUT: 850 mL / NET: -330 mL        Constitutional: No fever, fatigue  Skin: No rash.  Eyes: No recent vision problems or eye pain.  ENT: No congestion, ear pain, or sore throat.  Cardiovascular: No chest pain or palpation.  Respiratory: No cough, shortness of breath, congestion, or wheezing.  Gastrointestinal: No abdominal pain, nausea, vomiting, or diarrhea.  Genitourinary: No dysuria.  Musculoskeletal: No joint swelling.  Neurologic: No headache.    PHYSICAL EXAM:  GENERAL: NAD  EYES: EOMI, PERRLA  NECK: Supple, No JVD  CHEST/LUNG: dec breath sounds rt base  HEART:  S1 , S2 +  ABDOMEN: soft , bs+  EXTREMITIES:  trace edema  NEUROLOGY:alert awake      LABS:                        13.8   6.52  )-----------( 114      ( 30 Nov 2022 07:42 )             42.4     11-30    136  |  95<L>  |  16  ----------------------------<  155<H>  3.8   |  33<H>  |  0.89    Ca    8.5      30 Nov 2022 07:42  Phos  3.3     11-30  Mg     2.3     11-30    TPro  6.5  /  Alb  3.9  /  TBili  1.4<H>  /  DBili  x   /  AST  38  /  ALT  16  /  AlkPhos  71  11-30              RADIOLOGY & ADDITIONAL TESTS:    Imaging Personally Reviewed:    Consultant(s) Notes Reviewed:      Care Discussed with Consultants/Other Providers:

## 2022-12-04 LAB
CULTURE RESULTS: SIGNIFICANT CHANGE UP
CULTURE RESULTS: SIGNIFICANT CHANGE UP
SPECIMEN SOURCE: SIGNIFICANT CHANGE UP
SPECIMEN SOURCE: SIGNIFICANT CHANGE UP

## 2022-12-22 ENCOUNTER — TRANSCRIPTION ENCOUNTER (OUTPATIENT)
Age: 77
End: 2022-12-22

## 2023-01-18 ENCOUNTER — OUTPATIENT (OUTPATIENT)
Dept: OUTPATIENT SERVICES | Facility: HOSPITAL | Age: 78
LOS: 1 days | Discharge: HOME | End: 2023-01-18

## 2023-01-18 DIAGNOSIS — C50.919 MALIGNANT NEOPLASM OF UNSPECIFIED SITE OF UNSPECIFIED FEMALE BREAST: ICD-10-CM

## 2023-01-18 DIAGNOSIS — Z90.2 ACQUIRED ABSENCE OF LUNG [PART OF]: Chronic | ICD-10-CM

## 2023-01-18 DIAGNOSIS — Z96.653 PRESENCE OF ARTIFICIAL KNEE JOINT, BILATERAL: Chronic | ICD-10-CM

## 2023-01-18 PROBLEM — Z28.21 IMMUNIZATION NOT CARRIED OUT BECAUSE OF PATIENT REFUSAL: Chronic | Status: ACTIVE | Noted: 2022-11-29

## 2023-01-23 ENCOUNTER — APPOINTMENT (OUTPATIENT)
Dept: HEMATOLOGY ONCOLOGY | Facility: CLINIC | Age: 78
End: 2023-01-23
Payer: MEDICARE

## 2023-01-23 VITALS
DIASTOLIC BLOOD PRESSURE: 68 MMHG | HEIGHT: 59.84 IN | TEMPERATURE: 97.9 F | HEART RATE: 65 BPM | RESPIRATION RATE: 16 BRPM | WEIGHT: 155.87 LBS | SYSTOLIC BLOOD PRESSURE: 126 MMHG | BODY MASS INDEX: 30.6 KG/M2 | OXYGEN SATURATION: 96 %

## 2023-01-23 PROCEDURE — 99214 OFFICE O/P EST MOD 30 MIN: CPT

## 2023-01-25 NOTE — PHYSICAL EXAM
[Restricted in physically strenuous activity but ambulatory and able to carry out work of a light or sedentary nature] : Status 1- Restricted in physically strenuous activity but ambulatory and able to carry out work of a light or sedentary nature, e.g., light house work, office work [Obese] : obese [Normal] : affect appropriate [de-identified] : 3/6 KIRTI LSB unchanged [de-identified] : Right breast: healed incision UOQ. 5 mm nodule just superior to surgical scar - fat necrosis on imaging.  Left breast: no mass. No axillary LAD. [de-identified] : ecchymosis from insulin injections [de-identified] : no cervical, supraclav or axillary LAD

## 2023-01-25 NOTE — ASSESSMENT
[FreeTextEntry1] : 76 yo woman with history of Infiltrating ductal carcinoma right breast, grade 1-2, ER positive, OH positive, HER-2/tabitha negative, T1a N0 M0 (AJCC 8 edition), Status post lumpectomy/sentinel node biopsy in7/2018, Status post RT\par \par - anastrozole started 2/14/2019, tolerating well.\par - CATRACHITA x 4 years; planned for therapy for 7-10 years\par -The patient is clinically stable.\par - Mammo/sono bilateral was due 8/2022; will send patient now; script provided (goes to [facility])\par - Osteopenia T- 1.7 in 3/2021:  Bone Density due 3/2023; script provided (goes to [facility])\par - patient opting to have labs done with PMD/endocrine; requested to have them sent here to  [facility] \par - continue to fu with breast surgeon\par - Patient had the opportunity to have all their questions answered to their satisfaction \par - FU in 6mos

## 2023-01-25 NOTE — REVIEW OF SYSTEMS
[Incontinence] : incontinence [Negative] : Allergic/Immunologic [SOB on Exertion] : no shortness of breath during exertion [Dysmenorrhea/Abn Vaginal Bleeding] : no dysmenorrhea/abnormal vaginal bleeding [Depression] : no depression [de-identified] : upset about family member who has been diagnosed with urothelial cancer

## 2023-01-25 NOTE — HISTORY OF PRESENT ILLNESS
[Disease: _____________________] : Disease: [unfilled] [T: ___] : T[unfilled] [N: ___] : N[unfilled] [M: ___] : M[unfilled] [AJCC Stage: ____] : AJCC Stage: [unfilled] [de-identified] : The patient presented in 2018, at age 73, with an abnormal screening mammogram.\par \par Screening mammogram performed on 2018 demonstrated a lobulated asymmetric density in the medial right breast. Diagnostic right tomosynthesis mammogram on the same day demonstrated a 7 mm lobulated slightly irregular nodule in the medial right breast,at approximately 3:00. It was not seen on ultrasound. \par \par Stereotactic core biopsy was performed on 2018 and demonstrated well to moderately differentiated ductal infiltrating ductal carcinoma which was ER positive (%), OK positive (%) HER-2/tabitha negative (zero) and Ki-67 15%. 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 The infiltrating component measured 0.35 cm.\par \par Dr. Mindy Lowery performed a lumpectomy and sentinel lymph node biopsy on 2018. Pathology demonstrated no residual invasive carcinoma. There was DCIS, intermediate nuclear grade with necrosis and microcalcifications within the specimen measuring 0.6 cm and present in 2 of 14 blocks. The surgical margins were negative (>5 mm). There were 3 negative sentinel lymph nodes \par \par The patient has had an uneventful postoperative course.  \par \par Pt started anastrozole 2019 and continues to the present time.\par \par Risk factors:\par Prior breast disease: 2 previous biopsies, one demonstrating a papilloma. Menarche: Age 10. Menopause: Age 41. The patient is  5 para 2032 with her first childbirth at age 26. She did not breast-feed her children. Oral contraceptive use: None. Hormone replacement therapy: None. Other hormone exposure: None. Topical estrogen: None. Family history is positive for a mother who had breast cancer in her late 70s/early 80s, a sister who had breast cancer at age 67, and a maternal first cousin who had breast cancer in her 50s. The patient herself has had adenocarcinoma of the lung and basal cell carcinomas. The patient denies any other family history of cancer or colorectal neoplasia. She is of Sephardic ([place])  Oriental orthodox background. [facility]ae Multi-Cancer Panel (83 genes) drawn on 2019 was negative for pathogenic variants and for variants of uncertain significance. [de-identified] : Well to moderately differentiated infiltrating ductal carcinoma ER positive, MS positive, HER-2/tabitha negative [FreeTextEntry1] : anastrozole start 2/14/2019 [de-identified] : 1/23/23\par Patient presents today to rule out breast cancer disease progression and assess treatment toxicity.\par Transferring care from Dr. Camargo to me today.\par All of the patient's prior records including radiology, pathology and prior notes reviewed; Past Medical History, Past Surgical History, Family History and Social history reviewed and updated in the patient's chart.\par \par Patient stressed and upset as family member diagnosed with urothelial carcinoma and to undergo surgery\par \par Anastrozole start 2/14/2019, tolerating well.\par Patient denies any SOB, CP, abdominal pain, bone pain or headache.\par Patient denies any hotflashes, arthralgias, vaginal dryness, vaginal bleeding, hair loss, muscle cramps.\par Patient denies any breast masses, breast tenderness, skin changes or nipple discharge.\par + urinary incontinence - has tried numerous medical interventions,ultimately worked with acupuncturist\par \par Mammo/US 8/25/21, calcifications in right breast are almost certainly related to post-op fat necrosis and probably benign; rec fu right diagnostic mammo in 6mos recommended, BIRADS3. - f/up 2/28/22 - diagnostic f/up stable; overdue to annual mammo/sono 8/2022; Thought she had to wait until 2/2023\par \par CT A/P 9/24/21 done for epigastric pain, n/v, weight loss which showed [id number] tiny cystic lesion of pancreatic body, suspect IPMN, contrast MRI rec; 10mm right adrenal nodule also should be evaluated by MRI, findings suspicious for abnormally thickened endometrium measuring up to 21mm in thickness, erec eval with pelvic sono, diverticulosis, no acute diverticulitis.  Saw Dr. Hodgson GYN with no change in TVUS.  \par Had MRI abdomen 9/29/21 - subcm pancreatic cystic lesions up to 8mm without worrisome features compatible with side branch IPMNs, fu MRI abd/MRCP in 2 yrs for interval change; 7mm right adrenal adenoma, 1.1cm intraluminal gastric polpoid lesion, upper endo rec; incidental abnormal appearance of endometrium. \par \par \par HEALTH MAINTENANCE\par PCP/cardiologist Dr Christian Bautista f/u and management of her peripheral vascular disease and 'irreg HR' s/p ablation. Currently stable. Will fu for BP.\par Last eye exam 5/3/2021 with benign findings.  Will be receiving laser tx 5/11/2021. b/l cataract removal 3 yrs ago.\par Thoracic surgeon Dr Perez at [facility] [place] for [place]/u s/p resection of lung nodule 11/2015, sees once a year.  Saw pulmonologist Dr Hurtado 03/2020, reports progressively worsening SOB for the past 5 years.\par Most recent colonoscopy approximately 2015. Colonoscopy and endoscopy sched for 11/2021 with Dr. Beaulieu. \par Saw GYN-Onc Rema 3/2021. No vaginal bleeding or spotting. Was told her uterine lining thickening on TVUS, and continue to monitor. 'uterine mass' monitoring due to avoidance of surgery. Abdominal MRI with evidence of possible uterine mass; per note from Dr. Hodgson, has had endometrial polyp for 20 years\par Most recent BMD 5/3/2018 osteopenia L1-L4, T -2.0.  BMD 3/9/21 osteopenia T-1.6 in spine, improved\par Had bilateral knee replacement with Dr Wilson at [facility] on 5/20/2019.\par Last saw endocrinologist Dr Shikha Simpson  for f/u and management of her hypothyroidism, exam stable\par No COVID vaccine due to h/o allergy, pt very discomforted by mask

## 2023-03-16 NOTE — ED PROVIDER NOTE - IV ALTEPLASE INCLUSION HIDDEN
Preventative Blood work  · Lipid panel for CVD-ordered  · BMP-last check 10/14/2022, normal  · HIV - negative on 01/25/2022  · Hep C -negative on 01/25/2022  Preventative Screenings:  · Colonscopy-N/A, no family history  · Depression Screening- negative  Counseling  · Lifestyle changes  Immunizations  · Patient declined Tdap, flu, COVID vaccine 
There is no height or weight on file to calculate BMI      • Recommend incorporating a more whole foods plant-predominant diet along with decreasing consumption of red meats and processed foods  o Goals:  - Eat 2 vegetables per day  - Decreased sugary drinks  • Per AHA guidelines, recommend moderate-vigorous intensity exercise for 30 minutes a day for 5 days a week or a total of 150 min/week  o Goals:  - Walk for 10 minutes every other day
· 2016-unprovoked left lower extremity DVT, treated with Xarelto for 6 months  · December 2021-Acute DVT in left leg, started on Eliquis for 8 months  Doppler at that time showed resolution of previous DVT  · Mid October 2022- focal occlusive subacute versus chronic DVT in the posterior tibial veins  · Thrombosis panel at that time showed positive lupus anticoagulant, anti thrombin-III 84, low normal 92  · Pt follows with Hematology on Eliquis 2 5mg    Unlikely recurrent DVT as patient has been compliant with medication and is still taking Eliquis 5mg BID instead of the 2 5mg dose recommended by Hematology  However, Given patient's history, will repeat left lower leg venous duplex to rule out DVT with new left leg pain and swelling 
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
show

## 2023-04-26 NOTE — H&P ADULT - NSHPOUTPATIENTPROVIDERS_GEN_ALL_CORE
Scripts for IV Rocephin and Vanco faxed to [facility]. [facility] can start care Thursday evening. PICC to placed this afternoon. MARY signed. Christian Naranjo MD (PCP-Cardiology) [phone]  Deepali Camargo MD (oncology) [phone]  Shikha Simpson DO (endocrinology) [phone] Christian Naranjo MD (PCP-Cardiology)   Deepali Camargo MD (oncology) [phone]  Shikha Simpson DO (endocrinology) [phone]  Miracle Lawrence MD (Cardiothoracic Surgery [facility]) [phone]

## 2023-04-28 NOTE — PHYSICAL THERAPY INITIAL EVALUATION ADULT - IMPAIRED TRANSFERS: SIT/STAND, REHAB EVAL
LVM
LVM
Patient is returning your call from yesterday , can you call her
Pt called would like results of her urine culture and affirm  
decreased strength/impaired balance/pain/decreased ROM
decreased strength/impaired balance/pain

## 2023-05-26 NOTE — ED PROVIDER NOTE - SKIN, MLM
May 26, 2023     Patient: Walter Painting   YOB: 1995   Date of Visit: 5/26/2023       To Whom it May Concern:      Walter may return to work on Tuesday 5/30/2023 without any restrictions.       Sincerely,         Nga Oneill CNP    Medical information is confidential and cannot be disclosed without the written consent of the patient or her representative.     Skin normal color for race, warm, dry and intact. No evidence of rash.

## 2023-06-21 RX ORDER — ANASTROZOLE TABLETS 1 MG/1
1 TABLET ORAL
Qty: 90 | Refills: 1 | Status: ACTIVE | COMMUNITY
Start: 2019-02-25 | End: 1900-01-01

## 2023-07-20 ENCOUNTER — OUTPATIENT (OUTPATIENT)
Dept: OUTPATIENT SERVICES | Facility: HOSPITAL | Age: 78
LOS: 1 days | Discharge: HOME | End: 2023-07-20

## 2023-07-20 DIAGNOSIS — C50.919 MALIGNANT NEOPLASM OF UNSPECIFIED SITE OF UNSPECIFIED FEMALE BREAST: ICD-10-CM

## 2023-07-20 DIAGNOSIS — Z90.2 ACQUIRED ABSENCE OF LUNG [PART OF]: Chronic | ICD-10-CM

## 2023-07-20 DIAGNOSIS — Z96.653 PRESENCE OF ARTIFICIAL KNEE JOINT, BILATERAL: Chronic | ICD-10-CM

## 2023-07-31 ENCOUNTER — APPOINTMENT (OUTPATIENT)
Dept: HEMATOLOGY ONCOLOGY | Facility: CLINIC | Age: 78
End: 2023-07-31
Payer: MEDICARE

## 2023-07-31 VITALS
RESPIRATION RATE: 16 BRPM | WEIGHT: 155.4 LBS | HEART RATE: 74 BPM | TEMPERATURE: 97 F | BODY MASS INDEX: 30.51 KG/M2 | DIASTOLIC BLOOD PRESSURE: 78 MMHG | OXYGEN SATURATION: 95 % | SYSTOLIC BLOOD PRESSURE: 135 MMHG

## 2023-07-31 PROCEDURE — 99214 OFFICE O/P EST MOD 30 MIN: CPT

## 2023-07-31 NOTE — REVIEW OF SYSTEMS
[Incontinence] : incontinence [Negative] : Allergic/Immunologic [SOB on Exertion] : no shortness of breath during exertion [Dysmenorrhea/Abn Vaginal Bleeding] : no dysmenorrhea/abnormal vaginal bleeding [Depression] : no depression [de-identified] : upset about family member who has been diagnosed with urothelial cancer

## 2023-07-31 NOTE — ASSESSMENT
[FreeTextEntry1] : 78 yo woman with history of Infiltrating ductal carcinoma right breast, grade 1-2, ER positive, TN positive, HER-2/tabitha negative, T1a N0 M0 (AJCC 8 edition), Status post lumpectomy/sentinel node biopsy in7/2018, Status post RT\par  \par  - anastrozole started 2/14/2019, tolerating well.\par  - CATRACHITA x 4 years; planned for therapy for 7-10 years\par  -The patient is clinically stable.\par  - Mammo/sono bilateral was due 8/2022; will send patient now; script provided (goes to [facility])\par  - Osteopenia T- 1.7 in 3/2021:  Bone Density due 3/2023; script provided (goes to [facility])\par  - patient opting to have labs done with PMD/endocrine; requested to have them sent here to  [facility] \par  - continue to fu with breast surgeon\par  - Patient had the opportunity to have all their questions answered to their satisfaction \par  - FU in 6mos

## 2023-07-31 NOTE — HISTORY OF PRESENT ILLNESS
[Disease: _____________________] : Disease: [unfilled] [T: ___] : T[unfilled] [N: ___] : N[unfilled] [M: ___] : M[unfilled] [AJCC Stage: ____] : AJCC Stage: [unfilled] [de-identified] : The patient presented in 2018, at age 73, with an abnormal screening mammogram.  Screening mammogram performed on 2018 demonstrated a lobulated asymmetric density in the medial right breast. Diagnostic right tomosynthesis mammogram on the same day demonstrated a 7 mm lobulated slightly irregular nodule in the medial right breast,at approximately 3:00. It was not seen on ultrasound.   Stereotactic core biopsy was performed on 2018 and demonstrated well to moderately differentiated ductal infiltrating ductal carcinoma which was ER positive (%), OK positive (%) HER-2/tabitha negative (zero) and Ki-67 15%. 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 The infiltrating component measured 0.35 cm.  Dr. Mindy Lowery performed a lumpectomy and sentinel lymph node biopsy on 2018. Pathology demonstrated no residual invasive carcinoma. There was DCIS, intermediate nuclear grade with necrosis and microcalcifications within the specimen measuring 0.6 cm and present in 2 of 14 blocks. The surgical margins were negative (>5 mm). There were 3 negative sentinel lymph nodes   The patient has had an uneventful postoperative course.    Pt started anastrozole 2019 and continues to the present time.  Risk factors: Prior breast disease: 2 previous biopsies, one demonstrating a papilloma. Menarche: Age 10. Menopause: Age 41. The patient is  5 para  with her first childbirth at age 26. She did not breast-feed her children. Oral contraceptive use: None. Hormone replacement therapy: None. Other hormone exposure: None. Topical estrogen: None. Family history is positive for a mother who had breast cancer in her late 70s/early 80s, a sister who had breast cancer at age 67, and a maternal first cousin who had breast cancer in her 50s. The patient herself has had adenocarcinoma of the lung and basal cell carcinomas. The patient denies any other family history of cancer or colorectal neoplasia. She is of Sephardic ([place])  Adventism background. Invitae Multi-Cancer Panel (83 genes) drawn on 2019 was negative for pathogenic variants and for variants of uncertain significance. [de-identified] : Well to moderately differentiated infiltrating ductal carcinoma ER positive, TX positive, HER-2/tabitha negative [FreeTextEntry1] : anastrozole start 2/14/2019 [de-identified] : 7/31/23 Patient presents today to rule out breast cancer disease progression and assess treatment toxicity. Since last visit had uterine fibroid removed and also had thickened endometrial lining; unsure of final pathology Has developed increased urinary burning sensation for 3 days. Patient stressed and upset as family member diagnosed with urothelial carcinoma and to undergo surgery  Anastrozole start 2/14/2019, tolerating well. Patient denies any SOB, CP, abdominal pain, bone pain or headache. Patient denies any hotflashes, arthralgias, vaginal dryness, vaginal bleeding, hair loss, muscle cramps. Patient denies any breast masses, breast tenderness, skin changes or nipple discharge. + urinary incontinence - has tried numerous medical interventions,ultimately worked with acupuncturist  Mammo/US 8/25/21, calcifications in right breast are almost certainly related to post-op fat necrosis and probably benign; rec fu right diagnostic mammo in 6mos recommended, BIRADS3. - f/up 2/28/22 - diagnostic f/up stable; overdue to annual mammo/sono 8/2022; Thought she had to wait until 2/2023  CT A/P 9/24/21 done for epigastric pain, n/v, weight loss which showed [id number] tiny cystic lesion of pancreatic body, suspect IPMN, contrast MRI rec; 10mm right adrenal nodule also should be evaluated by MRI, findings suspicious for abnormally thickened endometrium measuring up to 21mm in thickness, erec eval with pelvic sono, diverticulosis, no acute diverticulitis.  Saw Dr. Hodgson GYN with no change in TVUS.   Had MRI abdomen 9/29/21 - subcm pancreatic cystic lesions up to 8mm without worrisome features compatible with side branch IPMNs, fu MRI abd/MRCP in 2 yrs for interval change; 7mm right adrenal adenoma, 1.1cm intraluminal gastric polpoid lesion, upper endo rec; incidental abnormal appearance of endometrium.    HEALTH MAINTENANCE PCP/cardiologist Dr Christian Bautista f/u and management of her peripheral vascular disease and 'irreg HR' s/p ablation. Currently stable. Will fu for BP. Last eye exam 5/3/2021 with benign findings.  Will be receiving laser tx 5/11/2021. b/l cataract removal 3 yrs ago. Thoracic surgeon Dr Perez at [facility] [place] for f/u s/p resection of lung nodule 11/2015, sees once a year.  Saw pulmonologist Dr Hurtado 03/2020, reports progressively worsening SOB for the past 5 years. Most recent colonoscopy approximately 2015. Colonoscopy and endoscopy sched for 11/2021 with Dr. Beaulieu.  Saw GYN-Onc Rema 3/2021. No vaginal bleeding or spotting. Was told her uterine lining thickening on TVUS, and continue to monitor. 'uterine mass' monitoring due to avoidance of surgery. Abdominal MRI with evidence of possible uterine mass; per note from Dr. Hodgson, has had endometrial polyp for 20 years Most recent BMD 5/3/2018 osteopenia L1-L4, T -2.0.  BMD 3/9/21 osteopenia T-1.6 in spine, improved Had bilateral knee replacement with Dr Wilson at [facility] on 5/20/2019. Last saw endocrinologist Dr Shikha Simpson  for f/u and management of her hypothyroidism, exam stable No COVID vaccine due to h/o allergy, pt very discomforted by mask

## 2023-07-31 NOTE — PHYSICAL EXAM
[Restricted in physically strenuous activity but ambulatory and able to carry out work of a light or sedentary nature] : Status 1- Restricted in physically strenuous activity but ambulatory and able to carry out work of a light or sedentary nature, e.g., light house work, office work [Obese] : obese [Normal] : affect appropriate [de-identified] : 3/6 KIRTI LSB unchanged [de-identified] : Right breast: healed incision UOQ. 5 mm nodule just superior to surgical scar - fat necrosis on imaging.  Left breast: no mass. No axillary LAD. [de-identified] : ecchymosis from insulin injections [de-identified] : no cervical, supraclav or axillary LAD

## 2023-10-05 NOTE — ED CDU PROVIDER NOTE - NEUROLOGICAL, MLM
Subjective   History of Present Illness    Review of Systems    Past Medical History:   Diagnosis Date    3/2023     Kidney stone      (normal spontaneous vaginal delivery) 2019    Postpartum anemia 2021       Allergies   Allergen Reactions    Penicillins Swelling     Orbital swelling       No past surgical history on file.    No family history on file.    Social History     Socioeconomic History    Marital status:    Tobacco Use    Smoking status: Never    Smokeless tobacco: Never   Vaping Use    Vaping Use: Never used   Substance and Sexual Activity    Alcohol use: No    Drug use: No    Sexual activity: Defer           Objective   Physical Exam    Procedures           ED Course                                           MDM    Final diagnoses:   None       ED Disposition  ED Disposition       None            No follow-up provider specified.       Medication List      No changes were made to your prescriptions during this visit.         
Alert and oriented, no focal deficits, no motor or sensory deficits.

## 2023-11-15 ENCOUNTER — APPOINTMENT (OUTPATIENT)
Dept: PULMONOLOGY | Facility: CLINIC | Age: 78
End: 2023-11-15

## 2024-03-11 NOTE — PHYSICAL THERAPY INITIAL EVALUATION ADULT - BED MOBILITY TRAINING, PT EVAL
Yes
3-5 sessions: pt will be able to perform supine <-> sit with supAx1
Pt will be able to go from supine<->sit with CG in 2-3days

## 2024-03-26 ENCOUNTER — OUTPATIENT (OUTPATIENT)
Dept: OUTPATIENT SERVICES | Facility: HOSPITAL | Age: 79
LOS: 1 days | Discharge: HOME | End: 2024-03-26

## 2024-03-26 DIAGNOSIS — Z96.653 PRESENCE OF ARTIFICIAL KNEE JOINT, BILATERAL: Chronic | ICD-10-CM

## 2024-03-26 DIAGNOSIS — Z90.2 ACQUIRED ABSENCE OF LUNG [PART OF]: Chronic | ICD-10-CM

## 2024-03-26 DIAGNOSIS — C50.919 MALIGNANT NEOPLASM OF UNSPECIFIED SITE OF UNSPECIFIED FEMALE BREAST: ICD-10-CM

## 2024-04-01 ENCOUNTER — APPOINTMENT (OUTPATIENT)
Dept: HEMATOLOGY ONCOLOGY | Facility: CLINIC | Age: 79
End: 2024-04-01
Payer: MEDICARE

## 2024-04-01 VITALS
DIASTOLIC BLOOD PRESSURE: 80 MMHG | OXYGEN SATURATION: 98 % | TEMPERATURE: 97.3 F | RESPIRATION RATE: 16 BRPM | BODY MASS INDEX: 30.51 KG/M2 | SYSTOLIC BLOOD PRESSURE: 138 MMHG | HEART RATE: 53 BPM | WEIGHT: 155.4 LBS

## 2024-04-01 DIAGNOSIS — E61.1 IRON DEFICIENCY: ICD-10-CM

## 2024-04-01 DIAGNOSIS — C50.911 MALIGNANT NEOPLASM OF UNSPECIFIED SITE OF RIGHT FEMALE BREAST: ICD-10-CM

## 2024-04-01 DIAGNOSIS — Z79.899 OTHER LONG TERM (CURRENT) DRUG THERAPY: ICD-10-CM

## 2024-04-01 PROCEDURE — 99214 OFFICE O/P EST MOD 30 MIN: CPT

## 2024-04-01 PROCEDURE — G2211 COMPLEX E/M VISIT ADD ON: CPT

## 2024-04-01 NOTE — REVIEW OF SYSTEMS
[Incontinence] : incontinence [de-identified] : upset about family member who has been diagnosed with urothelial cancer [SOB on Exertion] : no shortness of breath during exertion [Dysmenorrhea/Abn Vaginal Bleeding] : no dysmenorrhea/abnormal vaginal bleeding [Negative] : Psychiatric [Depression] : no depression [FreeTextEntry7] : abnormal sensation when swalloging

## 2024-04-01 NOTE — REASON FOR VISIT
[Follow-Up Visit] : a follow-up [FreeTextEntry2] : infiltrating ductal carcinoma right breast - chronic iron deficiency anemia

## 2024-04-01 NOTE — HISTORY OF PRESENT ILLNESS
[Disease: _____________________] : Disease: [unfilled] [T: ___] : T[unfilled] [M: ___] : M[unfilled] [N: ___] : N[unfilled] [AJCC Stage: ____] : AJCC Stage: [unfilled] [de-identified] : Well to moderately differentiated infiltrating ductal carcinoma ER positive, MI positive, HER-2/tabitha negative [de-identified] : The patient presented in 2018, at age 73, with an abnormal screening mammogram.  Screening mammogram performed on 2018 demonstrated a lobulated asymmetric density in the medial right breast. Diagnostic right tomosynthesis mammogram on the same day demonstrated a 7 mm lobulated slightly irregular nodule in the medial right breast,at approximately 3:00. It was not seen on ultrasound.   Stereotactic core biopsy was performed on 2018 and demonstrated well to moderately differentiated ductal infiltrating ductal carcinoma which was ER positive (%), OH positive (%) HER-2/tabitha negative (zero) and Ki-67 15%. 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 The infiltrating component measured 0.35 cm.  Dr. Mindy Lowery performed a lumpectomy and sentinel lymph node biopsy on 2018. Pathology demonstrated no residual invasive carcinoma. There was DCIS, intermediate nuclear grade with necrosis and microcalcifications within the specimen measuring 0.6 cm and present in 2 of 14 blocks. The surgical margins were negative (>5 mm). There were 3 negative sentinel lymph nodes   The patient has had an uneventful postoperative course.    Pt started anastrozole 2019 and continues to the present time.  Risk factors: Prior breast disease: 2 previous biopsies, one demonstrating a papilloma. Menarche: Age 10. Menopause: Age 41. The patient is  5 para  with her first childbirth at age 26. She did not breast-feed her children. Oral contraceptive use: None. Hormone replacement therapy: None. Other hormone exposure: None. Topical estrogen: None. Family history is positive for a mother who had breast cancer in her late 70s/early 80s, a sister who had breast cancer at age 67, and a maternal first cousin who had breast cancer in her 50s. The patient herself has had adenocarcinoma of the lung and basal cell carcinomas. The patient denies any other family history of cancer or colorectal neoplasia. She is of Sephardic ([place])  Christianity background. Invitae Multi-Cancer Panel (83 genes) drawn on 2019 was negative for pathogenic variants and for variants of uncertain significance. [FreeTextEntry1] : anastrozole start 2/14/2019 [de-identified] : 4/1/24 Patient presents today to rule out breast cancer disease progression and assess treatment toxicity. Also has been noted to to have chronic iron deficiency anemia Has had GI evaluation but unsure when her colonoscopy was; She recently had esophagogram to evaluate functional status of esophagus  Anastrozole start 2/14/2019, tolerating well. Patient denies any SOB, CP, abdominal pain, bone pain or headache. Patient denies any hotflashes, arthralgias, vaginal dryness, vaginal bleeding, hair loss, muscle cramps. Patient denies any breast masses, breast tenderness, skin changes or nipple discharge. + urinary incontinence - has tried numerous medical interventions, ultimately worked with acupuncturist  - Mammo/US 8/25/21, calcifications in right breast are almost certainly related to post-op fat necrosis and probably benign; rec fu right diagnostic mammo in 6mos recommended, BIRADS3.  - f/up Mammo/sono  2/28/22 - diagnostic f/up stable; overdue to annual mammo/sono 8/2022;   CT A/P 9/24/21 done for epigastric pain, n/v, weight loss which showed [id number] tiny cystic lesion of pancreatic body, suspect IPMN, contrast MRI rec; 10mm right adrenal nodule also should be evaluated by MRI, findings suspicious for abnormally thickened endometrium measuring up to 21mm in thickness, erec eval with pelvic sono, diverticulosis, no acute diverticulitis.  Saw Dr. Hodgson GYN with no change in TVUS.   Had MRI abdomen 9/29/21 - subcm pancreatic cystic lesions up to 8mm without worrisome features compatible with side branch IPMNs, fu MRI abd/MRCP in 2 yrs for interval change; 7mm right adrenal adenoma, 1.1cm intraluminal gastric polpoid lesion, upper endo rec; incidental abnormal appearance of endometrium.    HEALTH MAINTENANCE PCP/cardiologist Dr Christian Bautista f/u and management of her peripheral vascular disease and 'irreg HR' s/p ablation. Currently stable. Will fu for BP. Last eye exam 5/3/2021 with benign findings.  Will be receiving laser tx 5/11/2021. b/l cataract removal 3 yrs ago. Thoracic surgeon Dr Perez at [facility] [place] for f/u s/p resection of lung nodule 11/2015, sees once a year.  Saw pulmonologist Dr Hurtado 03/2020, reports progressively worsening SOB for the past 5 years. Most recent colonoscopy approximately 2015. Colonoscopy and endoscopy sched for 11/2021 with Dr. Beaulieu.  Saw GYN-Onc Rema 3/2021. No vaginal bleeding or spotting. Was told her uterine lining thickening on TVUS, and continue to monitor. 'uterine mass' monitoring due to avoidance of surgery. Abdominal MRI with evidence of possible uterine mass; per note from Dr. Hodgson, has had endometrial polyp for 20 years BMD 5/3/2018 osteopenia L1-L4, T -2.0.  BMD 3/9/21 osteopenia T-1.6 in spine, improved Had bilateral knee replacement with Dr Wilson at [facility] on 5/20/2019. Last saw endocrinologist Dr Shikha Simpson  for f/u and management of her hypothyroidism, exam stable No COVID vaccine due to h/o allergy, pt very discomforted by mask [80: Normal activity with effort; some signs or symptoms of disease.] : 80: Normal activity with effort; some signs or symptoms of disease.

## 2024-04-01 NOTE — PHYSICAL EXAM
[Restricted in physically strenuous activity but ambulatory and able to carry out work of a light or sedentary nature] : Status 1- Restricted in physically strenuous activity but ambulatory and able to carry out work of a light or sedentary nature, e.g., light house work, office work [Obese] : obese [Normal] : grossly intact [de-identified] : 3/6 KIRTI LSB unchanged [de-identified] : Right breast: healed incision UOQ. 5 mm nodule just superior to surgical scar - fat necrosis on imaging; clinically unchanged  Left breast: no mass. No axillary LAD. [de-identified] : no cervical, supraclav or axillary LAD

## 2024-04-01 NOTE — ASSESSMENT
[FreeTextEntry1] : 78yo woman with history of Infiltrating ductal carcinoma right breast, grade 1-2, ER positive, AR positive, HER-2/tabitha negative, T1a N0 M0 (AJCC 8 edition), Status post lumpectomy/sentinel node biopsy in7/2018, Status post RT  - anastrozole started 2/14/2019, tolerating well. - CATRACHITA x 5 years; planned for therapy for 7-10 years - Mammo/sono bilateral per patient done at [facility] in early 2023 and again early 2024; will request reports from [facility] - Osteopenia T- 1.7 in 3/2021:  Bone Density was due 3/2023 and script was provided; will request reports from [facility] - continue to fu with breast surgeon   #Iron deficiency  - reviewed labs from 1/2024 and again from 3/2024; iron studies c/w iron deficiency with low iron saturation and low ferritin levels - to start oral iron gluconate BID (slow release); if no improvement in 6 months, will need IV iron replacement - ecnouraged GI evaluation for EGD/Colonoscopy although despite being iron deficient she is not anemic with Hg >13 g/dL  - Patient had the opportunity to have all their questions answered to their satisfaction  - FU in 6mos

## 2024-04-18 NOTE — ED ADULT NURSE NOTE - CAS EDP DISCH DISPOSITION ADMI
Refill  LOV 12/20/23 Obesity binge eating disorder  amphetamine-dextroamphetamine (ADDERALL) 20 MG tablet 30 tablet 0 3/22/2024 --    Sig - Route: Take 1 tablet by mouth daily. - Oral    Med is loaded   9 Rene/Telemetry

## 2024-06-12 NOTE — DISCHARGE NOTE PROVIDER - DISCHARGE SERVICE FOR PATIENT
Drop them off and we will review   
Patient informed and will drop off and complete form tomorrow  06/13/24   
Patient would like to drop off [facility] forms to be filled out is this ok?      
on the discharge service for the patient. I have reviewed and made amendments to the documentation where necessary.

## 2024-09-26 ENCOUNTER — OUTPATIENT (OUTPATIENT)
Dept: OUTPATIENT SERVICES | Facility: HOSPITAL | Age: 79
LOS: 1 days | Discharge: HOME | End: 2024-09-26

## 2024-09-26 DIAGNOSIS — Z96.653 PRESENCE OF ARTIFICIAL KNEE JOINT, BILATERAL: Chronic | ICD-10-CM

## 2024-09-26 DIAGNOSIS — Z90.2 ACQUIRED ABSENCE OF LUNG [PART OF]: Chronic | ICD-10-CM

## 2024-09-26 DIAGNOSIS — C50.919 MALIGNANT NEOPLASM OF UNSPECIFIED SITE OF UNSPECIFIED FEMALE BREAST: ICD-10-CM

## 2024-10-01 ENCOUNTER — APPOINTMENT (OUTPATIENT)
Dept: OPHTHALMOLOGY | Facility: CLINIC | Age: 79
End: 2024-10-01

## 2024-10-07 ENCOUNTER — APPOINTMENT (OUTPATIENT)
Dept: HEMATOLOGY ONCOLOGY | Facility: CLINIC | Age: 79
End: 2024-10-07

## 2024-11-21 ENCOUNTER — APPOINTMENT (OUTPATIENT)
Dept: HEMATOLOGY ONCOLOGY | Facility: CLINIC | Age: 79
End: 2024-11-21

## 2024-11-24 ENCOUNTER — OUTPATIENT (OUTPATIENT)
Dept: OUTPATIENT SERVICES | Facility: HOSPITAL | Age: 79
LOS: 1 days | Discharge: HOME | End: 2024-11-24

## 2024-11-24 DIAGNOSIS — Z96.653 PRESENCE OF ARTIFICIAL KNEE JOINT, BILATERAL: Chronic | ICD-10-CM

## 2024-11-24 DIAGNOSIS — C50.919 MALIGNANT NEOPLASM OF UNSPECIFIED SITE OF UNSPECIFIED FEMALE BREAST: ICD-10-CM

## 2024-11-24 DIAGNOSIS — Z90.2 ACQUIRED ABSENCE OF LUNG [PART OF]: Chronic | ICD-10-CM

## 2024-11-29 ENCOUNTER — RESULT REVIEW (OUTPATIENT)
Age: 79
End: 2024-11-29

## 2024-11-29 ENCOUNTER — APPOINTMENT (OUTPATIENT)
Dept: HEMATOLOGY ONCOLOGY | Facility: CLINIC | Age: 79
End: 2024-11-29

## 2024-11-29 ENCOUNTER — APPOINTMENT (OUTPATIENT)
Dept: HEMATOLOGY ONCOLOGY | Facility: CLINIC | Age: 79
End: 2024-11-29
Payer: MEDICARE

## 2024-11-29 VITALS
SYSTOLIC BLOOD PRESSURE: 140 MMHG | BODY MASS INDEX: 30.21 KG/M2 | TEMPERATURE: 97.1 F | RESPIRATION RATE: 16 BRPM | HEART RATE: 76 BPM | OXYGEN SATURATION: 97 % | DIASTOLIC BLOOD PRESSURE: 84 MMHG | WEIGHT: 153.88 LBS

## 2024-11-29 DIAGNOSIS — M85.80 OTHER SPECIFIED DISORDERS OF BONE DENSITY AND STRUCTURE, UNSPECIFIED SITE: ICD-10-CM

## 2024-11-29 DIAGNOSIS — C50.911 MALIGNANT NEOPLASM OF UNSPECIFIED SITE OF RIGHT FEMALE BREAST: ICD-10-CM

## 2024-11-29 DIAGNOSIS — E61.1 IRON DEFICIENCY: ICD-10-CM

## 2024-11-29 DIAGNOSIS — Z79.899 OTHER LONG TERM (CURRENT) DRUG THERAPY: ICD-10-CM

## 2024-11-29 PROCEDURE — 99214 OFFICE O/P EST MOD 30 MIN: CPT

## 2024-11-29 PROCEDURE — G2211 COMPLEX E/M VISIT ADD ON: CPT

## 2024-11-29 RX ORDER — FERROUS SULFATE 325(65) MG
325 (65 FE) TABLET ORAL TWICE DAILY
Qty: 60 | Refills: 0 | Status: ACTIVE | COMMUNITY
Start: 2024-11-29 | End: 1900-01-01

## 2024-12-03 LAB
ALBUMIN SERPL ELPH-MCNC: 3.8 G/DL
ALP BLD-CCNC: 92 U/L
ALT SERPL-CCNC: 17 U/L
ANION GAP SERPL CALC-SCNC: 11 MMOL/L
AST SERPL-CCNC: 25 U/L
BILIRUB SERPL-MCNC: 1.2 MG/DL
BUN SERPL-MCNC: 15 MG/DL
CALCIUM SERPL-MCNC: 8.9 MG/DL
CHLORIDE SERPL-SCNC: 102 MMOL/L
CO2 SERPL-SCNC: 31 MMOL/L
CREAT SERPL-MCNC: 0.78 MG/DL
EGFR: 76 ML/MIN/1.73M2
FERRITIN SERPL-MCNC: 25 NG/ML
GLUCOSE SERPL-MCNC: 96 MG/DL
IRON SATN MFR SERPL: 11 %
IRON SERPL-MCNC: 39 UG/DL
POTASSIUM SERPL-SCNC: 4.3 MMOL/L
PROT SERPL-MCNC: 5.8 G/DL
SODIUM SERPL-SCNC: 144 MMOL/L
TIBC SERPL-MCNC: 349 UG/DL
UIBC SERPL-MCNC: 310 UG/DL

## 2024-12-19 ENCOUNTER — APPOINTMENT (OUTPATIENT)
Dept: HEMATOLOGY ONCOLOGY | Facility: CLINIC | Age: 79
End: 2024-12-19

## 2025-01-16 ENCOUNTER — APPOINTMENT (OUTPATIENT)
Dept: GERIATRICS | Facility: CLINIC | Age: 80
End: 2025-01-16

## 2025-01-16 ENCOUNTER — LABORATORY RESULT (OUTPATIENT)
Age: 80
End: 2025-01-16

## 2025-01-16 VITALS
WEIGHT: 153 LBS | SYSTOLIC BLOOD PRESSURE: 182 MMHG | RESPIRATION RATE: 20 BRPM | OXYGEN SATURATION: 94 % | DIASTOLIC BLOOD PRESSURE: 83 MMHG | HEIGHT: 59 IN | HEART RATE: 71 BPM | TEMPERATURE: 98.4 F | BODY MASS INDEX: 30.84 KG/M2

## 2025-01-16 DIAGNOSIS — M17.11 UNILATERAL PRIMARY OSTEOARTHRITIS, RIGHT KNEE: ICD-10-CM

## 2025-01-16 DIAGNOSIS — M85.80 OTHER SPECIFIED DISORDERS OF BONE DENSITY AND STRUCTURE, UNSPECIFIED SITE: ICD-10-CM

## 2025-01-16 DIAGNOSIS — Z96.653 PRESENCE OF ARTIFICIAL KNEE JOINT, BILATERAL: ICD-10-CM

## 2025-01-16 DIAGNOSIS — I10 ESSENTIAL (PRIMARY) HYPERTENSION: ICD-10-CM

## 2025-01-16 DIAGNOSIS — M17.0 BILATERAL PRIMARY OSTEOARTHRITIS OF KNEE: ICD-10-CM

## 2025-01-16 DIAGNOSIS — E78.5 HYPERLIPIDEMIA, UNSPECIFIED: ICD-10-CM

## 2025-01-16 DIAGNOSIS — E61.1 IRON DEFICIENCY: ICD-10-CM

## 2025-01-16 DIAGNOSIS — C50.911 MALIGNANT NEOPLASM OF UNSPECIFIED SITE OF RIGHT FEMALE BREAST: ICD-10-CM

## 2025-01-16 DIAGNOSIS — N39.0 URINARY TRACT INFECTION, SITE NOT SPECIFIED: ICD-10-CM

## 2025-01-16 DIAGNOSIS — I73.9 PERIPHERAL VASCULAR DISEASE, UNSPECIFIED: ICD-10-CM

## 2025-01-16 DIAGNOSIS — M17.12 UNILATERAL PRIMARY OSTEOARTHRITIS, LEFT KNEE: ICD-10-CM

## 2025-01-16 DIAGNOSIS — R13.10 DYSPHAGIA, UNSPECIFIED: ICD-10-CM

## 2025-01-16 DIAGNOSIS — K21.9 GASTRO-ESOPHAGEAL REFLUX DISEASE W/OUT ESOPHAGITIS: ICD-10-CM

## 2025-01-16 DIAGNOSIS — R53.83 OTHER FATIGUE: ICD-10-CM

## 2025-01-16 DIAGNOSIS — R32 UNSPECIFIED URINARY INCONTINENCE: ICD-10-CM

## 2025-01-16 PROCEDURE — 99204 OFFICE O/P NEW MOD 45 MIN: CPT

## 2025-01-18 PROBLEM — R32 URINARY INCONTINENCE, UNSPECIFIED TYPE: Status: ACTIVE | Noted: 2025-01-16

## 2025-01-21 PROBLEM — N39.0 URINARY TRACT INFECTION WITHOUT HEMATURIA, SITE UNSPECIFIED: Status: ACTIVE | Noted: 2025-01-21 | Resolved: 2025-02-20

## 2025-01-21 LAB
APPEARANCE: CLEAR
BILIRUBIN URINE: NEGATIVE
BLOOD URINE: NEGATIVE
COLOR: YELLOW
GLUCOSE QUALITATIVE U: 100 MG/DL
KETONES URINE: NEGATIVE MG/DL
LEUKOCYTE ESTERASE URINE: ABNORMAL
NITRITE URINE: NEGATIVE
PH URINE: 7
PROTEIN URINE: NEGATIVE MG/DL
SPECIFIC GRAVITY URINE: 1.02
UROBILINOGEN URINE: 1 MG/DL

## 2025-01-21 RX ORDER — SULFAMETHOXAZOLE AND TRIMETHOPRIM 800; 160 MG/1; MG/1
800-160 TABLET ORAL TWICE DAILY
Qty: 6 | Refills: 0 | Status: ACTIVE | COMMUNITY
Start: 2025-01-21 | End: 1900-01-01

## 2025-03-25 ENCOUNTER — APPOINTMENT (OUTPATIENT)
Dept: GERIATRICS | Facility: CLINIC | Age: 80
End: 2025-03-25

## 2025-05-25 ENCOUNTER — OUTPATIENT (OUTPATIENT)
Dept: OUTPATIENT SERVICES | Facility: HOSPITAL | Age: 80
LOS: 1 days | Discharge: HOME | End: 2025-05-25

## 2025-05-25 DIAGNOSIS — Z90.2 ACQUIRED ABSENCE OF LUNG [PART OF]: Chronic | ICD-10-CM

## 2025-05-25 DIAGNOSIS — C50.919 MALIGNANT NEOPLASM OF UNSPECIFIED SITE OF UNSPECIFIED FEMALE BREAST: ICD-10-CM

## 2025-05-25 DIAGNOSIS — Z96.653 PRESENCE OF ARTIFICIAL KNEE JOINT, BILATERAL: Chronic | ICD-10-CM

## 2025-05-30 ENCOUNTER — APPOINTMENT (OUTPATIENT)
Dept: HEMATOLOGY ONCOLOGY | Facility: CLINIC | Age: 80
End: 2025-05-30
Payer: MEDICARE

## 2025-05-30 ENCOUNTER — RESULT REVIEW (OUTPATIENT)
Age: 80
End: 2025-05-30

## 2025-05-30 VITALS
HEART RATE: 52 BPM | TEMPERATURE: 97.2 F | BODY MASS INDEX: 30.28 KG/M2 | OXYGEN SATURATION: 95 % | RESPIRATION RATE: 16 BRPM | DIASTOLIC BLOOD PRESSURE: 66 MMHG | WEIGHT: 149.91 LBS | SYSTOLIC BLOOD PRESSURE: 122 MMHG

## 2025-05-30 DIAGNOSIS — C50.911 MALIGNANT NEOPLASM OF UNSPECIFIED SITE OF RIGHT FEMALE BREAST: ICD-10-CM

## 2025-05-30 LAB
25(OH)D3 SERPL-MCNC: 78.1 NG/ML
ALBUMIN SERPL ELPH-MCNC: 3.9 G/DL
ALP BLD-CCNC: 105 U/L
ALT SERPL-CCNC: 22 U/L
ANION GAP SERPL CALC-SCNC: 14 MMOL/L
AST SERPL-CCNC: 31 U/L
BILIRUB SERPL-MCNC: 0.7 MG/DL
BUN SERPL-MCNC: 13 MG/DL
CALCIUM SERPL-MCNC: 9.2 MG/DL
CHLORIDE SERPL-SCNC: 101 MMOL/L
CO2 SERPL-SCNC: 27 MMOL/L
CREAT SERPL-MCNC: 0.73 MG/DL
EGFRCR SERPLBLD CKD-EPI 2021: 82 ML/MIN/1.73M2
FERRITIN SERPL-MCNC: 41 NG/ML
GLUCOSE SERPL-MCNC: 156 MG/DL
IRON SATN MFR SERPL: 13 %
IRON SERPL-MCNC: 37 UG/DL
POTASSIUM SERPL-SCNC: 4.7 MMOL/L
PROT SERPL-MCNC: 5.5 G/DL
SODIUM SERPL-SCNC: 143 MMOL/L
TIBC SERPL-MCNC: 295 UG/DL
UIBC SERPL-MCNC: 258 UG/DL

## 2025-05-30 PROCEDURE — G2211 COMPLEX E/M VISIT ADD ON: CPT

## 2025-05-30 PROCEDURE — 99214 OFFICE O/P EST MOD 30 MIN: CPT

## 2025-06-26 ENCOUNTER — APPOINTMENT (OUTPATIENT)
Age: 80
End: 2025-06-26
Payer: MEDICARE

## 2025-06-26 ENCOUNTER — NON-APPOINTMENT (OUTPATIENT)
Age: 80
End: 2025-06-26

## 2025-06-26 PROCEDURE — 92015 DETERMINE REFRACTIVE STATE: CPT

## 2025-06-26 PROCEDURE — 92250 FUNDUS PHOTOGRAPHY W/I&R: CPT

## 2025-06-26 PROCEDURE — 92004 COMPRE OPH EXAM NEW PT 1/>: CPT

## 2025-07-10 NOTE — ED ADULT NURSE NOTE - TEMPLATE LIST FOR HEAD TO TOE ASSESSMENT
No care due was identified.  [facility] Embedded Care Due Messages. Reference number: [id number].   7/10/2025 5:04:39 PM CDT   Cardiac

## 2025-07-14 ENCOUNTER — NON-APPOINTMENT (OUTPATIENT)
Age: 80
End: 2025-07-14

## 2025-07-14 PROBLEM — R26.81 GAIT INSTABILITY: Status: ACTIVE | Noted: 2025-07-14

## 2025-07-14 PROBLEM — R26.9 GAIT DIFFICULTY: Status: ACTIVE | Noted: 2025-07-14

## 2025-07-23 ENCOUNTER — APPOINTMENT (OUTPATIENT)
Dept: GERIATRICS | Facility: CLINIC | Age: 80
End: 2025-07-23